# Patient Record
Sex: FEMALE | Race: WHITE | NOT HISPANIC OR LATINO | Employment: OTHER | URBAN - NONMETROPOLITAN AREA
[De-identification: names, ages, dates, MRNs, and addresses within clinical notes are randomized per-mention and may not be internally consistent; named-entity substitution may affect disease eponyms.]

---

## 2023-10-18 PROBLEM — M79.7 FIBROMYALGIA: Status: ACTIVE | Noted: 2019-11-13

## 2023-10-18 PROBLEM — R11.2 NAUSEA AND VOMITING: Status: ACTIVE | Noted: 2021-08-17

## 2023-10-18 PROBLEM — R26.89 IMBALANCE: Status: ACTIVE | Noted: 2019-12-27

## 2023-10-18 PROBLEM — N63.20 LEFT BREAST MASS: Status: ACTIVE | Noted: 2018-05-25

## 2023-10-18 PROBLEM — E66.9 OBESITY (BMI 35.0-39.9 WITHOUT COMORBIDITY): Status: ACTIVE | Noted: 2017-11-17

## 2023-10-18 PROBLEM — G89.29 CHRONIC PAIN: Status: ACTIVE | Noted: 2020-02-02

## 2023-10-18 PROBLEM — R92.8 ABNORMAL MAMMOGRAM OF LEFT BREAST: Status: ACTIVE | Noted: 2018-05-25

## 2023-10-18 PROBLEM — H93.13 TINNITUS AURIUM, BILATERAL: Status: RESOLVED | Noted: 2019-12-27 | Resolved: 2023-10-18

## 2023-10-18 PROBLEM — K76.0 NAFLD (NONALCOHOLIC FATTY LIVER DISEASE): Status: ACTIVE | Noted: 2018-05-21

## 2023-10-18 PROBLEM — J02.9 SORE THROAT: Status: ACTIVE | Noted: 2023-10-18

## 2023-10-18 RX ORDER — METHOCARBAMOL 500 MG/1
1000 TABLET, FILM COATED ORAL
COMMUNITY
Start: 2022-08-03 | End: 2023-10-19 | Stop reason: ALTCHOICE

## 2023-10-18 RX ORDER — GABAPENTIN 400 MG/1
400 CAPSULE ORAL 3 TIMES DAILY
COMMUNITY
Start: 2023-06-15 | End: 2023-10-19 | Stop reason: SDUPTHER

## 2023-10-18 RX ORDER — LAMOTRIGINE 200 MG/1
200 TABLET ORAL
COMMUNITY
Start: 2022-08-05 | End: 2023-10-19 | Stop reason: ALTCHOICE

## 2023-10-18 RX ORDER — CHOLECALCIFEROL (VITAMIN D3) 50 MCG
50 TABLET ORAL DAILY
COMMUNITY
Start: 2021-02-01

## 2023-10-18 RX ORDER — OMEPRAZOLE 40 MG/1
40 CAPSULE, DELAYED RELEASE ORAL
COMMUNITY
Start: 2022-03-04 | End: 2023-10-19 | Stop reason: ALTCHOICE

## 2023-10-19 ENCOUNTER — TELEPHONE (OUTPATIENT)
Dept: PRIMARY CARE | Facility: CLINIC | Age: 48
End: 2023-10-19

## 2023-10-19 ENCOUNTER — OFFICE VISIT (OUTPATIENT)
Dept: PRIMARY CARE | Facility: CLINIC | Age: 48
End: 2023-10-19
Payer: COMMERCIAL

## 2023-10-19 VITALS
DIASTOLIC BLOOD PRESSURE: 72 MMHG | BODY MASS INDEX: 29.77 KG/M2 | HEIGHT: 63 IN | SYSTOLIC BLOOD PRESSURE: 110 MMHG | HEART RATE: 74 BPM | WEIGHT: 168 LBS

## 2023-10-19 DIAGNOSIS — K22.70 BARRETT'S ESOPHAGUS WITHOUT DYSPLASIA: ICD-10-CM

## 2023-10-19 DIAGNOSIS — R19.8 ALTERNATING CONSTIPATION AND DIARRHEA: ICD-10-CM

## 2023-10-19 DIAGNOSIS — E55.9 VITAMIN D DEFICIENCY: ICD-10-CM

## 2023-10-19 DIAGNOSIS — E78.5 HYPERLIPIDEMIA, UNSPECIFIED HYPERLIPIDEMIA TYPE: ICD-10-CM

## 2023-10-19 DIAGNOSIS — M79.7 FIBROMYALGIA: Primary | ICD-10-CM

## 2023-10-19 DIAGNOSIS — Z12.31 BREAST CANCER SCREENING BY MAMMOGRAM: ICD-10-CM

## 2023-10-19 DIAGNOSIS — E61.1 IRON DEFICIENCY: ICD-10-CM

## 2023-10-19 DIAGNOSIS — F31.9 BIPOLAR AFFECTIVE DISORDER, REMISSION STATUS UNSPECIFIED (MULTI): ICD-10-CM

## 2023-10-19 DIAGNOSIS — Z79.899 MEDICATION MANAGEMENT: ICD-10-CM

## 2023-10-19 DIAGNOSIS — R73.01 IFG (IMPAIRED FASTING GLUCOSE): ICD-10-CM

## 2023-10-19 DIAGNOSIS — E53.8 B12 DEFICIENCY: ICD-10-CM

## 2023-10-19 PROBLEM — J02.9 SORE THROAT: Status: RESOLVED | Noted: 2023-10-18 | Resolved: 2023-10-19

## 2023-10-19 PROBLEM — R92.8 ABNORMAL MAMMOGRAM OF LEFT BREAST: Status: RESOLVED | Noted: 2018-05-25 | Resolved: 2023-10-19

## 2023-10-19 PROBLEM — R11.2 NAUSEA AND VOMITING: Status: RESOLVED | Noted: 2021-08-17 | Resolved: 2023-10-19

## 2023-10-19 PROBLEM — E66.9 OBESITY (BMI 35.0-39.9 WITHOUT COMORBIDITY): Status: RESOLVED | Noted: 2017-11-17 | Resolved: 2023-10-19

## 2023-10-19 LAB
AMPHETAMINES UR QL SCN: ABNORMAL
BARBITURATES UR QL SCN: ABNORMAL
BZE UR QL SCN: ABNORMAL
CANNABINOIDS UR QL SCN: ABNORMAL
CREAT UR-MCNC: 368.1 MG/DL (ref 20–320)
PCP UR QL SCN: ABNORMAL

## 2023-10-19 PROCEDURE — 99204 OFFICE O/P NEW MOD 45 MIN: CPT | Performed by: NURSE PRACTITIONER

## 2023-10-19 PROCEDURE — 82570 ASSAY OF URINE CREATININE: CPT

## 2023-10-19 PROCEDURE — 80307 DRUG TEST PRSMV CHEM ANLYZR: CPT

## 2023-10-19 PROCEDURE — 1036F TOBACCO NON-USER: CPT | Performed by: NURSE PRACTITIONER

## 2023-10-19 RX ORDER — OMEPRAZOLE 40 MG/1
40 CAPSULE, DELAYED RELEASE ORAL
Qty: 90 CAPSULE | Refills: 3 | Status: SHIPPED | OUTPATIENT
Start: 2023-10-19

## 2023-10-19 RX ORDER — BISMUTH SUBSALICYLATE 262 MG
1 TABLET,CHEWABLE ORAL DAILY
COMMUNITY

## 2023-10-19 RX ORDER — DICYCLOMINE HYDROCHLORIDE 10 MG/1
10 CAPSULE ORAL 4 TIMES DAILY PRN
Qty: 120 CAPSULE | Refills: 1 | Status: SHIPPED | OUTPATIENT
Start: 2023-10-19

## 2023-10-19 RX ORDER — GABAPENTIN 400 MG/1
400 CAPSULE ORAL 3 TIMES DAILY
Qty: 90 CAPSULE | Refills: 2 | Status: SHIPPED | OUTPATIENT
Start: 2023-10-19 | End: 2023-11-14 | Stop reason: ALTCHOICE

## 2023-10-19 ASSESSMENT — PATIENT HEALTH QUESTIONNAIRE - PHQ9
2. FEELING DOWN, DEPRESSED OR HOPELESS: NOT AT ALL
SUM OF ALL RESPONSES TO PHQ9 QUESTIONS 1 AND 2: 0
1. LITTLE INTEREST OR PLEASURE IN DOING THINGS: NOT AT ALL

## 2023-10-19 ASSESSMENT — ENCOUNTER SYMPTOMS
VOMITING: 0
DYSURIA: 0
FREQUENCY: 0
HEADACHES: 0
WHEEZING: 0
SEIZURES: 0
DIARRHEA: 0
TROUBLE SWALLOWING: 0
ABDOMINAL PAIN: 0
MYALGIAS: 0
SLEEP DISTURBANCE: 0
CHILLS: 0
HEMATURIA: 0
JOINT SWELLING: 0
SHORTNESS OF BREATH: 0
COUGH: 0
CONSTIPATION: 0
APNEA: 0
NAUSEA: 0
WOUND: 0
CONFUSION: 0
FEVER: 0
ARTHRALGIAS: 0
SPEECH DIFFICULTY: 0
NUMBNESS: 0
PALPITATIONS: 0
FATIGUE: 0
EYE PAIN: 0
PHOTOPHOBIA: 0
NERVOUS/ANXIOUS: 0
CHOKING: 0
WEAKNESS: 0
UNEXPECTED WEIGHT CHANGE: 0
DIFFICULTY URINATING: 0
FACIAL ASYMMETRY: 0
NECK PAIN: 0
DIZZINESS: 0
BLOOD IN STOOL: 0
FLANK PAIN: 0
ABDOMINAL DISTENTION: 0
EYE REDNESS: 0
CHEST TIGHTNESS: 0
BACK PAIN: 0
SORE THROAT: 0

## 2023-10-19 NOTE — TELEPHONE ENCOUNTER
Patient received instructions and went over with them, patient would like a call to schedule colon and egd.

## 2023-10-19 NOTE — TELEPHONE ENCOUNTER
SCHEDULED  11/16/23  MAXIMUS TO SCHEDULE IN [facility] AND I ASKED FOR AN AFTERNOON TIME FRAME.

## 2023-10-19 NOTE — PROGRESS NOTES
Subjective   Patient ID: Fidelia Santiago is a 48 y.o. female who presents for Establish Care (PT WOULD LIKE TO RE START HER MEDICATION FOR FIBROMYALGIA ).      HPI:  Presents today [facility]. SHE RECENTLY MOVED TO THE [facility].   MED REFILL OF GABAPENTIN, WHICH SHE TAKES FOR FIBRO. SHE HAS BEEN OFF MED X 2 MONTHS R/T FINDING A NEW PCP BUT STATES THE PREVIOUS DOSE WAS HELPING.      ALTERNATING DIARRHEA AND CONSTIPATION X SEVERAL YEARS. LAST COLON 11/21. WAS TO BE REPEATED IN 3 YEARS R/T PREP. WILL ORDER R/T DIARRHEA AND CONSTIPATION   [facility]GG'S-LAST EGD 11/2021. HAS NOT BEEN TAKING PPI. INSTRUCTED TO START AND WILL ORDER EGD   States h/o IRON DEF- WILL CHECK LABS  B12- SHE WAS TAKING B12 INJECTIONS. WILL CHECK LEVELS   BIPOLAR- STABLE WITH THERAPY THROUGH [facility].  REFUSING MED MANAGEMENT   LEFT BREAST MASS ATYPICAL CELL REMOVED 6-7 YEARS AGO      OARRS:  Martha Ramirez, APRN-CNP on 10/19/2023 10:21 AM  I have personally reviewed the OARRS report for Fidelia Santiago. I have considered the risks of abuse, dependence, addiction and diversion    Is the patient prescribed a combination of a benzodiazepine and opioid?  No    Last Urine Drug Screen / ordered today: Yes  Recent Results (from the past 8760 hour(s))   DRUG SCREEN,URINE    Collection Time: 05/12/23  6:49 PM   Result Value Ref Range    DRUG SCREEN COMMENT URINE SEE BELOW     Amphetamine Screen, Urine PRESUMPTIVE NEGATIVE NEGATIVE    Barbiturate Screen, Urine PRESUMPTIVE NEGATIVE NEGATIVE    BENZODIAZEPINE (PRESENCE) IN URINE BY SCREEN METHOD PRESUMPTIVE NEGATIVE NEGATIVE    Cannabinoid Screen, Urine PRESUMPTIVE NEGATIVE NEGATIVE    Cocaine Screen, Urine PRESUMPTIVE NEGATIVE NEGATIVE    Fentanyl, Ur PRESUMPTIVE NEGATIVE NEGATIVE    Methadone Screen, Urine PRESUMPTIVE NEGATIVE NEGATIVE    Opiate Screen, Urine PRESUMPTIVE NEGATIVE NEGATIVE    Oxycodone Screen, Ur PRESUMPTIVE NEGATIVE NEGATIVE    PCP Screen, Urine PRESUMPTIVE NEGATIVE  "NEGATIVE     N/A        Controlled Substance Agreement:  Date of the Last Agreement: 10/19/2023  Reviewed Controlled Substance Agreement including but not limited to the benefits, risks, and alternatives to treatment with a Controlled Substance medication(s).      Visit Vitals  /72 (BP Location: Right arm, Patient Position: Sitting)   Pulse 74   Ht 1.6 m (5' 3\")   Wt 76.2 kg (168 lb)   BMI 29.76 kg/m²   OB Status Having periods   Smoking Status Never   BSA 1.84 m²        Review of Systems   Constitutional:  Negative for chills, fatigue, fever and unexpected weight change.   HENT:  Negative for congestion, ear pain, sore throat and trouble swallowing.    Eyes:  Negative for photophobia, pain, redness and visual disturbance.   Respiratory:  Negative for apnea, cough, choking, chest tightness, shortness of breath and wheezing.    Cardiovascular:  Negative for chest pain, palpitations and leg swelling.   Gastrointestinal:  Negative for abdominal distention, abdominal pain, blood in stool, constipation, diarrhea, nausea and vomiting.   Genitourinary:  Negative for difficulty urinating, dysuria, flank pain, frequency, hematuria and urgency.   Musculoskeletal:  Negative for arthralgias, back pain, gait problem, joint swelling, myalgias and neck pain.   Skin:  Negative for rash and wound.   Neurological:  Negative for dizziness, seizures, syncope, facial asymmetry, speech difficulty, weakness, numbness and headaches.   Psychiatric/Behavioral:  Negative for confusion, sleep disturbance and suicidal ideas. The patient is not nervous/anxious.        Objective     Physical Exam  Constitutional:       Appearance: Normal appearance. She is normal weight.   HENT:      Head: Normocephalic.   Eyes:      Extraocular Movements: Extraocular movements intact.      Conjunctiva/sclera: Conjunctivae normal.      Pupils: Pupils are equal, round, and reactive to light.   Cardiovascular:      Rate and Rhythm: Normal rate and regular " rhythm.      Pulses: Normal pulses.      Heart sounds: Normal heart sounds.   Pulmonary:      Effort: Pulmonary effort is normal.      Breath sounds: Normal breath sounds.   Musculoskeletal:         General: Normal range of motion.      Cervical back: Normal range of motion.   Skin:     General: Skin is warm and dry.   Neurological:      General: No focal deficit present.      Mental Status: She is alert and oriented to person, place, and time.   Psychiatric:         Mood and Affect: Mood normal.         Behavior: Behavior normal.         Thought Content: Thought content normal.         Judgment: Judgment normal.            Assessment/Plan   Problem List Items Addressed This Visit       Bipolar disorder, unspecified (CMS/[facility])    Relevant Orders    Comprehensive Metabolic Panel    Hemoglobin A1C    Lipid Panel    TSH with reflex to Free T4 if abnormal    CBC and Auto Differential    Iron and TIBC    Folate    Ferritin    Vitamin B12    Vitamin D 25-Hydroxy,Total (for eval of Vitamin D levels)    Parathyroid Hormone, Intact    Fibromyalgia - Primary    Relevant Medications    gabapentin (Neurontin) 400 mg capsule    Other Relevant Orders    Comprehensive Metabolic Panel    Hemoglobin A1C    Lipid Panel    TSH with reflex to Free T4 if abnormal    CBC and Auto Differential    Iron and TIBC    Folate    Ferritin    Vitamin B12    Vitamin D 25-Hydroxy,Total (for eval of Vitamin D levels)    Parathyroid Hormone, Intact    Breast cancer screening by mammogram    Relevant Orders    BI mammo bilateral screening tomosynthesis    B12 deficiency    Relevant Orders    Comprehensive Metabolic Panel    Hemoglobin A1C    Lipid Panel    TSH with reflex to Free T4 if abnormal    CBC and Auto Differential    Iron and TIBC    Folate    Ferritin    Vitamin B12    Vitamin D 25-Hydroxy,Total (for eval of Vitamin D levels)    Parathyroid Hormone, Intact    Iron deficiency    Relevant Orders    Comprehensive Metabolic Panel    Hemoglobin  A1C    Lipid Panel    TSH with reflex to Free T4 if abnormal    CBC and Auto Differential    Iron and TIBC    Folate    Ferritin    Vitamin B12    Vitamin D 25-Hydroxy,Total (for eval of Vitamin D levels)    Parathyroid Hormone, Intact    Vitamin D deficiency    Relevant Orders    Comprehensive Metabolic Panel    Hemoglobin A1C    Lipid Panel    TSH with reflex to Free T4 if abnormal    CBC and Auto Differential    Iron and TIBC    Folate    Ferritin    Vitamin B12    Vitamin D 25-Hydroxy,Total (for eval of Vitamin D levels)    Parathyroid Hormone, Intact     Other Visit Diagnoses       IFG (impaired fasting glucose)        Relevant Orders    Hemoglobin A1C    Hyperlipidemia, unspecified hyperlipidemia type        Relevant Orders    Comprehensive Metabolic Panel    Hemoglobin A1C    Lipid Panel    TSH with reflex to Free T4 if abnormal    CBC and Auto Differential    Iron and TIBC    Folate    Ferritin    Vitamin B12    Vitamin D 25-Hydroxy,Total (for eval of Vitamin D levels)    Parathyroid Hormone, Intact    Medication management        Relevant Orders    Opiate/Opioid/Benzo Prescription Compliance    Torres's esophagus without dysplasia        Relevant Medications    omeprazole (PriLOSEC) 40 mg DR capsule    Other Relevant Orders    EGD    Alternating constipation and diarrhea        Relevant Medications    dicyclomine (Bentyl) 10 mg capsule    Other Relevant Orders    Colonoscopy Diagnostic            WE DISCUSSED MOST COMMON SIDE EFFECTS OF PRESCRIBED MEDICATIONS. INDICATIONS, RISK, COMPLICATIONS, AND ALTERNATIVES OF MEDICATION/THERAPEUTICS WERE EXPLAINED AND DISCUSSED. PLEASE MONITOR CLOSELY FOR ANY UNTOWARD SIDE EFFECTS OR COMPLICATIONS OF MEDICATIONS. PATIENT IS STRONGLY ADVISED TO BE COMPLIANT WITH RECOMMENDATIONS. QUESTIONS AND CONCERNS WERE ADDRESSED. INSTRUCTED TO CALL, RETURN SOONER, OR GO TO THE ER,  IF SYMPTOMS PERSIST OR WORSEN. THEY VOICED UNDERSTANDING AND  DENIES FURTHER QUESTIONS AT THIS  TIME.    TIME CODE  1. PREPARATION FOR PATIENT'S VISIT (REVIEWING CHART, CURRENT MEDICAL RECORDS, OUTSIDE HEALTH PROVIDER RECORDS, PREVIOUS HISTORY, EXAM, TEST, PROCEDURE, AND MEDICATIONS)  2. FACE TO FACE ENCOUNTER OBTAINING HISTORY FROM THE PATIENT/FAMILY/CAREGIVERS; PERFORMING EVALUATION AND EXAMINATION; ORDERING TESTS OR PROCEDURES; REFERRING AND COMMUNICATING WITH OTHER HEALTHCARE PROVIDERS; COUNSELING AND EDUCATION OF THE PATIENT/FAMILY/CAREGIVERS; INDEPENDENTLY INTERPRETING RESULTS (TESTS, LABS, PROCEDURES, IMAGING) AND COMMUNICATING AND EXPLAINING RESULTS TO THE PATIENT/FAMILY/CAREGIVERS  3. COORDINATION OF CARE; PREPARING AND PRINTING DISCHARGE INSTRUCTIONS AND ANY EDUCATIONAL MATERIAL FOR THE PATIENT/FAMILY/CAREGIVERS. DOCUMENTING CLINICAL INFORMATION IN THE ELECTRONIC MEDICAL RECORD   4. REVIEWING OARRS AS NEEDED    MDM  1) COMPLEXITY: MORE THAN 1 STABLE CHRONIC CONDITION ADDRESSED OR 1 ACUTE ILLNESS ADDRESSED   2)DATA: TESTS INTERPRETED AND OR ORDERED, TOOK INDEPENDENT HISTORY OR RECORDS REVIEWED  3)RISK: MODERATE RISK DUE TO NATURE OF MEDICAL CONDITIONS/COMORBIDITY OR MEDICATIONS ORDERED OR SURGICAL OR PROCEDURE REFERRAL      AFTER TESTING

## 2023-10-25 ENCOUNTER — HOSPITAL ENCOUNTER (OUTPATIENT)
Dept: RADIOLOGY | Facility: HOSPITAL | Age: 48
Discharge: HOME | End: 2023-10-25
Payer: COMMERCIAL

## 2023-10-25 DIAGNOSIS — Z12.31 BREAST CANCER SCREENING BY MAMMOGRAM: ICD-10-CM

## 2023-10-25 PROCEDURE — 77067 SCR MAMMO BI INCL CAD: CPT

## 2023-10-25 PROCEDURE — 77067 SCR MAMMO BI INCL CAD: CPT | Performed by: RADIOLOGY

## 2023-10-25 PROCEDURE — 77063 BREAST TOMOSYNTHESIS BI: CPT | Performed by: RADIOLOGY

## 2023-10-27 ENCOUNTER — HOSPITAL ENCOUNTER (OUTPATIENT)
Dept: RADIOLOGY | Facility: EXTERNAL LOCATION | Age: 48
Discharge: HOME | End: 2023-10-27
Payer: COMMERCIAL

## 2023-11-14 ENCOUNTER — TELEMEDICINE (OUTPATIENT)
Dept: PRIMARY CARE | Facility: CLINIC | Age: 48
End: 2023-11-14
Payer: COMMERCIAL

## 2023-11-14 DIAGNOSIS — J01.90 ACUTE NON-RECURRENT SINUSITIS, UNSPECIFIED LOCATION: Primary | ICD-10-CM

## 2023-11-14 PROCEDURE — 99214 OFFICE O/P EST MOD 30 MIN: CPT | Performed by: NURSE PRACTITIONER

## 2023-11-14 RX ORDER — PREDNISONE 10 MG/1
30 TABLET ORAL DAILY
Qty: 15 TABLET | Refills: 0 | Status: SHIPPED | OUTPATIENT
Start: 2023-11-14 | End: 2023-12-14 | Stop reason: ALTCHOICE

## 2023-11-14 RX ORDER — AZITHROMYCIN 250 MG/1
TABLET, FILM COATED ORAL
Qty: 6 TABLET | Refills: 0 | Status: SHIPPED | OUTPATIENT
Start: 2023-11-14 | End: 2023-11-19

## 2023-11-14 ASSESSMENT — ENCOUNTER SYMPTOMS
NERVOUS/ANXIOUS: 0
MYALGIAS: 0
DIARRHEA: 0
FREQUENCY: 0
NUMBNESS: 0
BACK PAIN: 0
CHOKING: 0
FACIAL ASYMMETRY: 0
CONFUSION: 0
EYE PAIN: 0
ABDOMINAL PAIN: 0
UNEXPECTED WEIGHT CHANGE: 0
JOINT SWELLING: 0
TROUBLE SWALLOWING: 0
CHILLS: 0
COUGH: 1
SPEECH DIFFICULTY: 0
DIFFICULTY URINATING: 0
PHOTOPHOBIA: 0
NAUSEA: 0
FATIGUE: 1
SORE THROAT: 0
ABDOMINAL DISTENTION: 0
SLEEP DISTURBANCE: 0
DIZZINESS: 0
VOMITING: 0
WEAKNESS: 0
WHEEZING: 0
PALPITATIONS: 0
CHEST TIGHTNESS: 0
SHORTNESS OF BREATH: 0
APNEA: 0
ARTHRALGIAS: 0
DYSURIA: 0
BLOOD IN STOOL: 0
HEADACHES: 0
NECK PAIN: 0
WOUND: 0
SEIZURES: 0
HEMATURIA: 0
EYE REDNESS: 0
CONSTIPATION: 0
FLANK PAIN: 0
FEVER: 0

## 2023-11-14 ASSESSMENT — PATIENT HEALTH QUESTIONNAIRE - PHQ9
2. FEELING DOWN, DEPRESSED OR HOPELESS: NOT AT ALL
1. LITTLE INTEREST OR PLEASURE IN DOING THINGS: NOT AT ALL
SUM OF ALL RESPONSES TO PHQ9 QUESTIONS 1 AND 2: 0

## 2023-11-14 NOTE — PROGRESS NOTES
Subjective   Patient ID: Fidelia Santiago is a 48 y.o. female who presents for Cough (Productive Associated with green phlegm and congestion x 7 days ).      VIRTUAL APPOINTMENT BEING PERFORMED DUE TO COVID-19 (CORONAVIRUS)    HPI:  Presents today for C/O NASAL CONGESTION AND PRODUCTIVE COUGH X 7 DAYS  modifying factors consists of NONE  associated symptoms consist of CHEST PRESSURE ON/OFF.  EAR PAIN. NO SOB, CP, OR WHEEZING prior treatment consists of medication OTC COUGH MEDS     Visit Vitals  OB Status Having periods   Smoking Status Never        Review of Systems   Constitutional:  Positive for fatigue. Negative for chills, fever and unexpected weight change.   HENT:  Positive for congestion and ear pain. Negative for sore throat and trouble swallowing.    Eyes:  Negative for photophobia, pain, redness and visual disturbance.   Respiratory:  Positive for cough. Negative for apnea, choking, chest tightness, shortness of breath and wheezing.    Cardiovascular:  Negative for chest pain, palpitations and leg swelling.   Gastrointestinal:  Negative for abdominal distention, abdominal pain, blood in stool, constipation, diarrhea, nausea and vomiting.   Genitourinary:  Negative for difficulty urinating, dysuria, flank pain, frequency, hematuria and urgency.   Musculoskeletal:  Negative for arthralgias, back pain, gait problem, joint swelling, myalgias and neck pain.   Skin:  Negative for rash and wound.   Neurological:  Negative for dizziness, seizures, syncope, facial asymmetry, speech difficulty, weakness, numbness and headaches.   Psychiatric/Behavioral:  Negative for confusion, sleep disturbance and suicidal ideas. The patient is not nervous/anxious.        Objective     Physical Exam  Neurological:      Mental Status: She is alert.   Psychiatric:         Mood and Affect: Mood normal.         Behavior: Behavior normal.         Thought Content: Thought content normal.         Judgment: Judgment normal.             Assessment/Plan   Problem List Items Addressed This Visit       Acute non-recurrent sinusitis - Primary    Relevant Medications    azithromycin (Zithromax) 250 mg tablet    predniSONE (Deltasone) 10 mg tablet     INSTRUCTED TO INCREASE FLUID INTAKE AND TAKE TYLENOL 650 MG PO Q6H/PRN FOR PAIN OR FEVER. RETURN SOONER OR GO TO THE ER IF SYMPTOMS PERSIST OR WORSEN      WE DISCUSSED MOST COMMON SIDE EFFECTS OF PRESCRIBED MEDICATIONS. INDICATIONS, RISK, COMPLICATIONS, AND ALTERNATIVES OF MEDICATION/THERAPEUTICS WERE EXPLAINED AND DISCUSSED. PLEASE MONITOR CLOSELY FOR ANY UNTOWARD SIDE EFFECTS OR COMPLICATIONS OF MEDICATIONS. PATIENT IS STRONGLY ADVISED TO BE COMPLIANT WITH RECOMMENDATIONS. QUESTIONS AND CONCERNS WERE ADDRESSED. INSTRUCTED TO CALL, RETURN SOONER, OR GO TO THE ER,  IF SYMPTOMS PERSIST OR WORSEN. THEY VOICED UNDERSTANDING AND  DENIES FURTHER QUESTIONS AT THIS TIME.    TIME CODE  1. PREPARATION FOR PATIENT'S VISIT (REVIEWING CHART, CURRENT MEDICAL RECORDS, OUTSIDE HEALTH PROVIDER RECORDS, PREVIOUS HISTORY, EXAM, TEST, PROCEDURE, AND MEDICATIONS)  2. FACE TO FACE ENCOUNTER OBTAINING HISTORY FROM THE PATIENT/FAMILY/CAREGIVERS; PERFORMING EVALUATION AND EXAMINATION; ORDERING TESTS OR PROCEDURES; REFERRING AND COMMUNICATING WITH OTHER HEALTHCARE PROVIDERS; COUNSELING AND EDUCATION OF THE PATIENT/FAMILY/CAREGIVERS; INDEPENDENTLY INTERPRETING RESULTS (TESTS, LABS, PROCEDURES, IMAGING) AND COMMUNICATING AND EXPLAINING RESULTS TO THE PATIENT/FAMILY/CAREGIVERS  3. COORDINATION OF CARE; PREPARING AND PRINTING DISCHARGE INSTRUCTIONS AND ANY EDUCATIONAL MATERIAL FOR THE PATIENT/FAMILY/CAREGIVERS. DOCUMENTING CLINICAL INFORMATION IN THE ELECTRONIC MEDICAL RECORD   4. REVIEWING OARRS AS NEEDED    MDM  1) COMPLEXITY: MORE THAN 1 STABLE CHRONIC CONDITION ADDRESSED OR 1 ACUTE ILLNESS ADDRESSED   2)DATA: TESTS INTERPRETED AND OR ORDERED, TOOK INDEPENDENT HISTORY OR RECORDS REVIEWED  3)RISK: MODERATE RISK DUE TO NATURE OF  MEDICAL CONDITIONS/COMORBIDITY OR MEDICATIONS ORDERED OR SURGICAL OR PROCEDURE REFERRAL      1 MONTH WITH LABS ALREADY ORDER

## 2023-11-16 ENCOUNTER — APPOINTMENT (OUTPATIENT)
Dept: GASTROENTEROLOGY | Facility: CLINIC | Age: 48
End: 2023-11-16
Payer: COMMERCIAL

## 2023-11-27 ENCOUNTER — APPOINTMENT (OUTPATIENT)
Dept: RADIOLOGY | Facility: HOSPITAL | Age: 48
End: 2023-11-27
Payer: COMMERCIAL

## 2023-11-27 ENCOUNTER — HOSPITAL ENCOUNTER (EMERGENCY)
Facility: HOSPITAL | Age: 48
Discharge: HOME | End: 2023-11-27
Attending: EMERGENCY MEDICINE
Payer: COMMERCIAL

## 2023-11-27 VITALS
HEIGHT: 63 IN | DIASTOLIC BLOOD PRESSURE: 81 MMHG | RESPIRATION RATE: 18 BRPM | SYSTOLIC BLOOD PRESSURE: 126 MMHG | BODY MASS INDEX: 28.35 KG/M2 | WEIGHT: 160 LBS | OXYGEN SATURATION: 98 % | HEART RATE: 75 BPM | TEMPERATURE: 97.8 F

## 2023-11-27 DIAGNOSIS — N20.0 KIDNEY STONE: Primary | ICD-10-CM

## 2023-11-27 LAB
ALBUMIN SERPL BCP-MCNC: 4.3 G/DL (ref 3.4–5)
ALP SERPL-CCNC: 110 U/L (ref 33–110)
ALT SERPL W P-5'-P-CCNC: 12 U/L (ref 7–45)
ANION GAP SERPL CALC-SCNC: 9 MMOL/L (ref 10–20)
APPEARANCE UR: CLEAR
AST SERPL W P-5'-P-CCNC: 13 U/L (ref 9–39)
B-HCG SERPL-ACNC: 5 MIU/ML
BASOPHILS # BLD AUTO: 0.03 X10*3/UL (ref 0–0.1)
BASOPHILS NFR BLD AUTO: 0.4 %
BILIRUB SERPL-MCNC: 0.4 MG/DL (ref 0–1.2)
BILIRUB UR STRIP.AUTO-MCNC: NEGATIVE MG/DL
BUN SERPL-MCNC: 12 MG/DL (ref 6–23)
CALCIUM SERPL-MCNC: 9.1 MG/DL (ref 8.6–10.3)
CHLORIDE SERPL-SCNC: 107 MMOL/L (ref 98–107)
CO2 SERPL-SCNC: 30 MMOL/L (ref 21–32)
COLOR UR: YELLOW
CREAT SERPL-MCNC: 0.76 MG/DL (ref 0.5–1.05)
EOSINOPHIL # BLD AUTO: 0.02 X10*3/UL (ref 0–0.7)
EOSINOPHIL NFR BLD AUTO: 0.3 %
ERYTHROCYTE [DISTWIDTH] IN BLOOD BY AUTOMATED COUNT: 12.9 % (ref 11.5–14.5)
GFR SERPL CREATININE-BSD FRML MDRD: >90 ML/MIN/1.73M*2
GLUCOSE SERPL-MCNC: 85 MG/DL (ref 74–99)
GLUCOSE UR STRIP.AUTO-MCNC: NEGATIVE MG/DL
HCT VFR BLD AUTO: 42.8 % (ref 36–46)
HGB BLD-MCNC: 13.8 G/DL (ref 12–16)
HOLD SPECIMEN: NORMAL
IMM GRANULOCYTES # BLD AUTO: 0.03 X10*3/UL (ref 0–0.7)
IMM GRANULOCYTES NFR BLD AUTO: 0.4 % (ref 0–0.9)
KETONES UR STRIP.AUTO-MCNC: NEGATIVE MG/DL
LACTATE SERPL-SCNC: 0.8 MMOL/L (ref 0.4–2)
LEUKOCYTE ESTERASE UR QL STRIP.AUTO: NEGATIVE
LIPASE SERPL-CCNC: 61 U/L (ref 9–82)
LYMPHOCYTES # BLD AUTO: 1.89 X10*3/UL (ref 1.2–4.8)
LYMPHOCYTES NFR BLD AUTO: 24.4 %
MCH RBC QN AUTO: 29.4 PG (ref 26–34)
MCHC RBC AUTO-ENTMCNC: 32.2 G/DL (ref 32–36)
MCV RBC AUTO: 91 FL (ref 80–100)
MONOCYTES # BLD AUTO: 0.56 X10*3/UL (ref 0.1–1)
MONOCYTES NFR BLD AUTO: 7.2 %
NEUTROPHILS # BLD AUTO: 5.23 X10*3/UL (ref 1.2–7.7)
NEUTROPHILS NFR BLD AUTO: 67.3 %
NITRITE UR QL STRIP.AUTO: NEGATIVE
NRBC BLD-RTO: 0 /100 WBCS (ref 0–0)
PH UR STRIP.AUTO: 6 [PH]
PLATELET # BLD AUTO: 231 X10*3/UL (ref 150–450)
POTASSIUM SERPL-SCNC: 3.8 MMOL/L (ref 3.5–5.3)
PROT SERPL-MCNC: 6.8 G/DL (ref 6.4–8.2)
PROT UR STRIP.AUTO-MCNC: NEGATIVE MG/DL
RBC # BLD AUTO: 4.69 X10*6/UL (ref 4–5.2)
RBC # UR STRIP.AUTO: NEGATIVE /UL
SODIUM SERPL-SCNC: 142 MMOL/L (ref 136–145)
SP GR UR STRIP.AUTO: 1.02
UROBILINOGEN UR STRIP.AUTO-MCNC: <2 MG/DL
WBC # BLD AUTO: 7.8 X10*3/UL (ref 4.4–11.3)

## 2023-11-27 PROCEDURE — 99284 EMERGENCY DEPT VISIT MOD MDM: CPT | Mod: 25 | Performed by: EMERGENCY MEDICINE

## 2023-11-27 PROCEDURE — 74177 CT ABD & PELVIS W/CONTRAST: CPT | Mod: FOREIGN READ | Performed by: RADIOLOGY

## 2023-11-27 PROCEDURE — 36415 COLL VENOUS BLD VENIPUNCTURE: CPT | Performed by: EMERGENCY MEDICINE

## 2023-11-27 PROCEDURE — 96375 TX/PRO/DX INJ NEW DRUG ADDON: CPT

## 2023-11-27 PROCEDURE — 81003 URINALYSIS AUTO W/O SCOPE: CPT | Performed by: EMERGENCY MEDICINE

## 2023-11-27 PROCEDURE — 85025 COMPLETE CBC W/AUTO DIFF WBC: CPT | Performed by: EMERGENCY MEDICINE

## 2023-11-27 PROCEDURE — 84702 CHORIONIC GONADOTROPIN TEST: CPT | Performed by: EMERGENCY MEDICINE

## 2023-11-27 PROCEDURE — 94760 N-INVAS EAR/PLS OXIMETRY 1: CPT

## 2023-11-27 PROCEDURE — 96361 HYDRATE IV INFUSION ADD-ON: CPT

## 2023-11-27 PROCEDURE — 2550000001 HC RX 255 CONTRASTS: Performed by: EMERGENCY MEDICINE

## 2023-11-27 PROCEDURE — 83605 ASSAY OF LACTIC ACID: CPT | Performed by: EMERGENCY MEDICINE

## 2023-11-27 PROCEDURE — 2500000004 HC RX 250 GENERAL PHARMACY W/ HCPCS (ALT 636 FOR OP/ED): Performed by: EMERGENCY MEDICINE

## 2023-11-27 PROCEDURE — 83690 ASSAY OF LIPASE: CPT | Performed by: EMERGENCY MEDICINE

## 2023-11-27 PROCEDURE — 74177 CT ABD & PELVIS W/CONTRAST: CPT

## 2023-11-27 PROCEDURE — 80053 COMPREHEN METABOLIC PANEL: CPT | Performed by: EMERGENCY MEDICINE

## 2023-11-27 PROCEDURE — 96374 THER/PROPH/DIAG INJ IV PUSH: CPT

## 2023-11-27 RX ORDER — MORPHINE SULFATE 4 MG/ML
4 INJECTION, SOLUTION INTRAMUSCULAR; INTRAVENOUS ONCE
Status: COMPLETED | OUTPATIENT
Start: 2023-11-27 | End: 2023-11-27

## 2023-11-27 RX ORDER — MORPHINE SULFATE 4 MG/ML
INJECTION, SOLUTION INTRAMUSCULAR; INTRAVENOUS
Status: DISPENSED
Start: 2023-11-27 | End: 2023-11-27

## 2023-11-27 RX ORDER — ONDANSETRON HYDROCHLORIDE 4 MG/2ML
4 INJECTION, SOLUTION INTRAMUSCULAR; INTRAVENOUS ONCE
Status: DISCONTINUED | OUTPATIENT
Start: 2023-11-27 | End: 2023-11-27

## 2023-11-27 RX ORDER — ONDANSETRON HYDROCHLORIDE 2 MG/ML
4 INJECTION, SOLUTION INTRAVENOUS ONCE
Status: COMPLETED | OUTPATIENT
Start: 2023-11-27 | End: 2023-11-27

## 2023-11-27 RX ORDER — ONDANSETRON HYDROCHLORIDE 2 MG/ML
INJECTION, SOLUTION INTRAVENOUS
Status: DISPENSED
Start: 2023-11-27 | End: 2023-11-27

## 2023-11-27 RX ORDER — SODIUM CHLORIDE 9 MG/ML
150 INJECTION, SOLUTION INTRAVENOUS CONTINUOUS
Status: DISCONTINUED | OUTPATIENT
Start: 2023-11-27 | End: 2023-11-27 | Stop reason: HOSPADM

## 2023-11-27 RX ADMIN — SODIUM CHLORIDE 150 ML/HR: 9 INJECTION, SOLUTION INTRAVENOUS at 13:40

## 2023-11-27 RX ADMIN — SODIUM CHLORIDE 500 ML: 9 INJECTION, SOLUTION INTRAVENOUS at 11:54

## 2023-11-27 RX ADMIN — ONDANSETRON 4 MG: 2 INJECTION INTRAMUSCULAR; INTRAVENOUS at 11:53

## 2023-11-27 RX ADMIN — MORPHINE SULFATE 4 MG: 4 INJECTION, SOLUTION INTRAMUSCULAR; INTRAVENOUS at 11:53

## 2023-11-27 RX ADMIN — IOHEXOL 70 ML: 350 INJECTION, SOLUTION INTRAVENOUS at 14:19

## 2023-11-27 ASSESSMENT — PAIN DESCRIPTION - LOCATION: LOCATION: ABDOMEN

## 2023-11-27 ASSESSMENT — PAIN SCALES - GENERAL
PAINLEVEL_OUTOF10: 0 - NO PAIN
PAINLEVEL_OUTOF10: 9

## 2023-11-27 ASSESSMENT — PAIN DESCRIPTION - FREQUENCY: FREQUENCY: CONSTANT/CONTINUOUS

## 2023-11-27 ASSESSMENT — PAIN DESCRIPTION - ORIENTATION: ORIENTATION: RIGHT

## 2023-11-27 ASSESSMENT — PAIN DESCRIPTION - PROGRESSION: CLINICAL_PROGRESSION: GRADUALLY WORSENING

## 2023-11-27 ASSESSMENT — COLUMBIA-SUICIDE SEVERITY RATING SCALE - C-SSRS
6. HAVE YOU EVER DONE ANYTHING, STARTED TO DO ANYTHING, OR PREPARED TO DO ANYTHING TO END YOUR LIFE?: NO
2. HAVE YOU ACTUALLY HAD ANY THOUGHTS OF KILLING YOURSELF?: NO
1. IN THE PAST MONTH, HAVE YOU WISHED YOU WERE DEAD OR WISHED YOU COULD GO TO SLEEP AND NOT WAKE UP?: NO

## 2023-11-27 ASSESSMENT — PAIN DESCRIPTION - DESCRIPTORS: DESCRIPTORS: ACHING;SHOOTING;STABBING

## 2023-11-27 ASSESSMENT — PAIN - FUNCTIONAL ASSESSMENT
PAIN_FUNCTIONAL_ASSESSMENT: 0-10
PAIN_FUNCTIONAL_ASSESSMENT: 0-10

## 2023-11-27 ASSESSMENT — PAIN DESCRIPTION - PAIN TYPE: TYPE: ACUTE PAIN

## 2023-11-27 ASSESSMENT — PAIN DESCRIPTION - ONSET: ONSET: GRADUAL

## 2023-11-27 NOTE — ED PROVIDER NOTES
Right flank, back pain, possible kidney stone.  This 48-year-old white female presents to the ED with complaint of right-sided flank and back pain with concern for possible kidney stone.  She states that her symptoms started Thursday evening with urinary frequency and then developed right flank pain on Friday with radiation into her belly and hip area she states that the pain is migrated down the right side of her lower back and now has pain also down the anterior thigh she denies any focal neurologic symptoms.  She states that her last kidney stone was 6 months ago.  She does admit to having similar symptoms previously with kidney stones.  She denies any hematuria.  She states that movement makes her pain worse rest helps to some extent.      History provided by:  Patient   used: No         Physical Exam  Vitals and nursing note reviewed.   Constitutional:       General: She is awake.      Appearance: Normal appearance. She is overweight.   HENT:      Head: Normocephalic and atraumatic.      Jaw: There is normal jaw occlusion.      Nose: Nose normal. No congestion or rhinorrhea.      Mouth/Throat:      Mouth: Mucous membranes are moist.      Pharynx: Oropharynx is clear. No oropharyngeal exudate or posterior oropharyngeal erythema.   Eyes:      General: Lids are normal.         Right eye: No discharge.         Left eye: No discharge.      Extraocular Movements: Extraocular movements intact.      Conjunctiva/sclera: Conjunctivae normal.      Pupils: Pupils are equal, round, and reactive to light.   Cardiovascular:      Rate and Rhythm: Normal rate and regular rhythm.      Pulses: Normal pulses.      Heart sounds: Normal heart sounds. No murmur heard.     No friction rub. No gallop.   Pulmonary:      Effort: Pulmonary effort is normal. No respiratory distress.      Breath sounds: Normal breath sounds. No stridor. No wheezing, rhonchi or rales.   Chest:      Chest wall: No tenderness.   Abdominal:       General: Abdomen is flat. Bowel sounds are normal. There is no distension.      Palpations: Abdomen is soft. There is no mass.      Tenderness: There is abdominal tenderness in the right lower quadrant. There is no guarding or rebound.      Hernia: No hernia is present.   Musculoskeletal:         General: No swelling, deformity or signs of injury. Normal range of motion.      Cervical back: Normal, full passive range of motion without pain, normal range of motion and neck supple.      Thoracic back: Normal.      Lumbar back: Tenderness present. No bony tenderness. Normal range of motion. Negative right straight leg raise test and negative left straight leg raise test.        Back:       Right lower leg: No edema.      Left lower leg: No edema.      Comments: Patient has increased discomfort with straight leg testing on the right side in the lumbar spine.   Skin:     General: Skin is warm and dry.      Capillary Refill: Capillary refill takes less than 2 seconds.      Coloration: Skin is not jaundiced or pale.      Findings: No bruising, erythema, lesion or rash.   Neurological:      General: No focal deficit present.      Mental Status: She is alert and oriented to person, place, and time.      Cranial Nerves: No cranial nerve deficit.      Sensory: No sensory deficit.      Motor: No weakness.      Coordination: Coordination normal.      Deep Tendon Reflexes: Reflexes normal.   Psychiatric:         Mood and Affect: Mood normal.         Behavior: Behavior normal. Behavior is cooperative.         Thought Content: Thought content normal.         Judgment: Judgment normal.          Labs Reviewed   COMPREHENSIVE METABOLIC PANEL - Abnormal       Result Value    Glucose 85      Sodium 142      Potassium 3.8      Chloride 107      Bicarbonate 30      Anion Gap 9 (*)     Urea Nitrogen 12      Creatinine 0.76      eGFR >90      Calcium 9.1      Albumin 4.3      Alkaline Phosphatase 110      Total Protein 6.8      AST 13       Bilirubin, Total 0.4      ALT 12     HUMAN CHORIONIC GONADOTROPIN, SERUM QUANTITATIVE - Abnormal    HCG, Beta-Quantitative 5 (*)     Narrative:      Total HCG measurement is performed using the [employer]   Immunoassay which detects intact HCG and free beta HCG subunit.    This test is not indicated for use as a tumor marker.   HCG testing is performed using a different test methodology at [facility] than other [facility]. Direct result comparison   should only be made within the same method.       LACTATE - Normal    Lactate 0.8      Narrative:     Venipuncture immediately after or during the administration of Metamizole may lead to falsely low results. Testing should be performed immediately  prior to Metamizole dosing.   LIPASE - Normal    Lipase 61      Narrative:     Venipuncture immediately after or during the administration of Metamizole may lead to falsely low results. Testing should be performed immediately prior to Metamizole dosing.   URINALYSIS WITH REFLEX MICROSCOPIC AND CULTURE - Normal    Color, Urine Yellow      Appearance, Urine Clear      Specific Gravity, Urine 1.019      pH, Urine 6.0      Protein, Urine NEGATIVE      Glucose, Urine NEGATIVE      Blood, Urine NEGATIVE      Ketones, Urine NEGATIVE      Bilirubin, Urine NEGATIVE      Urobilinogen, Urine <2.0      Nitrite, Urine NEGATIVE      Leukocyte Esterase, Urine NEGATIVE     CBC WITH AUTO DIFFERENTIAL    WBC 7.8      nRBC 0.0      RBC 4.69      Hemoglobin 13.8      Hematocrit 42.8      MCV 91      MCH 29.4      MCHC 32.2      RDW 12.9      Platelets 231      Neutrophils % 67.3      Immature Granulocytes %, Automated 0.4      Lymphocytes % 24.4      Monocytes % 7.2      Eosinophils % 0.3      Basophils % 0.4      Neutrophils Absolute 5.23      Immature Granulocytes Absolute, Automated 0.03      Lymphocytes Absolute 1.89      Monocytes Absolute 0.56      Eosinophils Absolute 0.02      Basophils Absolute 0.03      URINALYSIS WITH REFLEX MICROSCOPIC AND CULTURE    Narrative:     The following orders were created for panel order Urinalysis with Reflex Microscopic and Culture.  Procedure                               Abnormality         Status                     ---------                               -----------         ------                     Urinalysis with Reflex M...[[id number]]  Normal              Final result               Extra Urine Gray Tube[[id number]]                            Final result                 Please view results for these tests on the individual orders.   EXTRA URINE GRAY TUBE    Extra Tube Hold for add-ons.          CT abdomen pelvis w IV contrast   Final Result   Punctate stone within the urinary bladder near the LEFT UVJ.  This   appears to be passed from the ureter into the bladder and is   nonobstructing.   Signed by Richi Estrada II, MD           Procedures     Medical Decision Making  Patient was seen and evaluated due to concern for possible kidney stone with associated right-sided back pain and flank pain.  Patient was ordered blood work urinalysis and CT scan of the abdomen pelvis she was also ordered a quantitative hCG which was abnormal at 5.  I did have a discussion with the patient concerning whether or not she was pregnant she states that there is no way in the fact that she was pregnant and she signed paperwork to have the CT scan performed.  CT scan imaging abdomen pelvis with IV contrast revealed evidence of a recently passed kidney stone at the left UVJ.  I then had discussion with the patient concerning her abnormal CT scan findings and she was discharged home with a diagnosis of a recently passed kidney stone I did recommend she have a repeat pregnancy test as an outpatient in a week.         Diagnoses as of 11/28/23 0905   Kidney stone                    Viraj Escalera, DO  11/28/23 0905

## 2023-11-27 NOTE — DISCHARGE INSTRUCTIONS
CT scan of your abdomen and pelvis reveals evidence that you passed a kidney stone today.  I would take a pregnancy test in a week.

## 2023-12-14 ENCOUNTER — LAB (OUTPATIENT)
Dept: LAB | Facility: LAB | Age: 48
End: 2023-12-14
Payer: COMMERCIAL

## 2023-12-14 ENCOUNTER — OFFICE VISIT (OUTPATIENT)
Dept: PRIMARY CARE | Facility: CLINIC | Age: 48
End: 2023-12-14
Payer: COMMERCIAL

## 2023-12-14 VITALS
DIASTOLIC BLOOD PRESSURE: 88 MMHG | WEIGHT: 160 LBS | HEART RATE: 100 BPM | BODY MASS INDEX: 28.35 KG/M2 | HEIGHT: 63 IN | OXYGEN SATURATION: 96 % | SYSTOLIC BLOOD PRESSURE: 138 MMHG

## 2023-12-14 DIAGNOSIS — R79.89 ELEVATED SERUM HCG: Primary | ICD-10-CM

## 2023-12-14 DIAGNOSIS — R79.89 ELEVATED SERUM HCG: ICD-10-CM

## 2023-12-14 LAB — B-HCG SERPL-ACNC: 4 MIU/ML

## 2023-12-14 PROCEDURE — 83002 ASSAY OF GONADOTROPIN (LH): CPT

## 2023-12-14 PROCEDURE — 83001 ASSAY OF GONADOTROPIN (FSH): CPT

## 2023-12-14 PROCEDURE — 84702 CHORIONIC GONADOTROPIN TEST: CPT

## 2023-12-14 PROCEDURE — 36415 COLL VENOUS BLD VENIPUNCTURE: CPT

## 2023-12-14 PROCEDURE — 1036F TOBACCO NON-USER: CPT | Performed by: NURSE PRACTITIONER

## 2023-12-14 PROCEDURE — 99214 OFFICE O/P EST MOD 30 MIN: CPT | Performed by: NURSE PRACTITIONER

## 2023-12-14 ASSESSMENT — ENCOUNTER SYMPTOMS
SPEECH DIFFICULTY: 0
PALPITATIONS: 0
TROUBLE SWALLOWING: 0
FATIGUE: 0
VOMITING: 0
MYALGIAS: 0
NAUSEA: 0
COUGH: 0
CHOKING: 0
WHEEZING: 0
CONSTIPATION: 0
SORE THROAT: 0
FREQUENCY: 0
FACIAL ASYMMETRY: 0
FEVER: 0
JOINT SWELLING: 0
NUMBNESS: 0
BACK PAIN: 0
UNEXPECTED WEIGHT CHANGE: 0
SLEEP DISTURBANCE: 0
EYE PAIN: 0
HEADACHES: 0
NERVOUS/ANXIOUS: 0
ARTHRALGIAS: 0
NECK PAIN: 0
CHILLS: 0
EYE REDNESS: 0
SEIZURES: 0
CHEST TIGHTNESS: 0
WEAKNESS: 0
DYSURIA: 0
FLANK PAIN: 0
SHORTNESS OF BREATH: 0
DIARRHEA: 0
HEMATURIA: 0
ABDOMINAL PAIN: 1
APNEA: 0
ABDOMINAL DISTENTION: 0
BLOOD IN STOOL: 0
DIZZINESS: 0
PHOTOPHOBIA: 0
DIFFICULTY URINATING: 0
WOUND: 0
CONFUSION: 0

## 2023-12-14 ASSESSMENT — PATIENT HEALTH QUESTIONNAIRE - PHQ9
1. LITTLE INTEREST OR PLEASURE IN DOING THINGS: NOT AT ALL
2. FEELING DOWN, DEPRESSED OR HOPELESS: NOT AT ALL
SUM OF ALL RESPONSES TO PHQ9 QUESTIONS 1 AND 2: 0

## 2023-12-14 NOTE — PROGRESS NOTES
"Subjective   Patient ID: Fidelia Santiago is a 48 y.o. female who presents for Follow-up (Pt has not completed labs at this time. ).      HPI:  Presents today for [facility] ER FU FOR ABD PAIN ON 11/27/23. C/O RIGHT PELVIC PAIN ON/OFF  X 3-4 WEEKS  modifying factors consists of HCG LEVEL WAS SLIGHTLY ELEVATED ON 11/27/23.  CT ABD/PELVIS DATED 11/27/23 UNREMARKABLE FOR PAIN. SHE STATES SHE DOES HAVE HER OVARIES TIED AND BURNED.  associated symptoms consist of NO BOWEL OR BLADDER ISSUES  prior treatment consists of medication NONE     Visit Vitals  /88 (BP Location: Right arm)   Pulse 100   Ht 1.6 m (5' 3\")   Wt 72.6 kg (160 lb)   SpO2 96%   BMI 28.34 kg/m²   OB Status Menopausal   Smoking Status Never   BSA 1.8 m²        Review of Systems   Constitutional:  Negative for chills, fatigue, fever and unexpected weight change.   HENT:  Negative for congestion, ear pain, sore throat and trouble swallowing.    Eyes:  Negative for photophobia, pain, redness and visual disturbance.   Respiratory:  Negative for apnea, cough, choking, chest tightness, shortness of breath and wheezing.    Cardiovascular:  Negative for chest pain, palpitations and leg swelling.   Gastrointestinal:  Positive for abdominal pain (RIGHT PELVIC PAIN). Negative for abdominal distention, blood in stool, constipation, diarrhea, nausea and vomiting.   Genitourinary:  Negative for difficulty urinating, dysuria, flank pain, frequency, hematuria and urgency.   Musculoskeletal:  Negative for arthralgias, back pain, gait problem, joint swelling, myalgias and neck pain.   Skin:  Negative for rash and wound.   Neurological:  Negative for dizziness, seizures, syncope, facial asymmetry, speech difficulty, weakness, numbness and headaches.   Psychiatric/Behavioral:  Negative for confusion, sleep disturbance and suicidal ideas. The patient is not nervous/anxious.        Objective     [facility] RECORDS REVIEWED FROM 11/27/23    Physical Exam  Constitutional:       " Appearance: Normal appearance. She is normal weight.   HENT:      Head: Normocephalic.   Eyes:      Extraocular Movements: Extraocular movements intact.      Conjunctiva/sclera: Conjunctivae normal.      Pupils: Pupils are equal, round, and reactive to light.   Cardiovascular:      Rate and Rhythm: Normal rate and regular rhythm.      Pulses: Normal pulses.      Heart sounds: Normal heart sounds.   Pulmonary:      Effort: Pulmonary effort is normal.      Breath sounds: Normal breath sounds.   Abdominal:      General: Bowel sounds are normal.      Tenderness: There is abdominal tenderness (MILD RIGHT PELVIC TENDERNESS).   Musculoskeletal:         General: Normal range of motion.      Cervical back: Normal range of motion.   Skin:     General: Skin is warm and dry.   Neurological:      General: No focal deficit present.      Mental Status: She is alert and oriented to person, place, and time.   Psychiatric:         Mood and Affect: Mood normal.         Behavior: Behavior normal.         Thought Content: Thought content normal.         Judgment: Judgment normal.            Assessment/Plan   Problem List Items Addressed This Visit       Elevated serum hCG - Primary    Relevant Orders    Follicle Stimulating Hormone    Luteinizing Hormone    Human Chorionic Gonadotropin, Serum Quantitative     IF PAIN PERSISTS, GO TO THE ER. I WILL ORDER HCG TO CONFIRM PREGNANCY.  WE DID DISCUSS THE POSSIBILITY OF A TUBAL PREGNANCY AND WE DISCUSSED IN DETAIL THE IMPORTANCE OF GOING TO THE ER. SHE VOICED UNDERSTANDING      WE DISCUSSED MOST COMMON SIDE EFFECTS OF PRESCRIBED MEDICATIONS. INDICATIONS, RISK, COMPLICATIONS, AND ALTERNATIVES OF MEDICATION/THERAPEUTICS WERE EXPLAINED AND DISCUSSED. PLEASE MONITOR CLOSELY FOR ANY UNTOWARD SIDE EFFECTS OR COMPLICATIONS OF MEDICATIONS. PATIENT IS STRONGLY ADVISED TO BE COMPLIANT WITH RECOMMENDATIONS. QUESTIONS AND CONCERNS WERE ADDRESSED. INSTRUCTED TO CALL, RETURN SOONER, OR GO TO THE ER,  IF  SYMPTOMS PERSIST OR WORSEN. THEY VOICED UNDERSTANDING AND  DENIES FURTHER QUESTIONS AT THIS TIME.    TIME CODE  1. PREPARATION FOR PATIENT'S VISIT (REVIEWING CHART, CURRENT MEDICAL RECORDS, OUTSIDE HEALTH PROVIDER RECORDS, PREVIOUS HISTORY, EXAM, TEST, PROCEDURE, AND MEDICATIONS)  2. FACE TO FACE ENCOUNTER OBTAINING HISTORY FROM THE PATIENT/FAMILY/CAREGIVERS; PERFORMING EVALUATION AND EXAMINATION; ORDERING TESTS OR PROCEDURES; REFERRING AND COMMUNICATING WITH OTHER HEALTHCARE PROVIDERS; COUNSELING AND EDUCATION OF THE PATIENT/FAMILY/CAREGIVERS; INDEPENDENTLY INTERPRETING RESULTS (TESTS, LABS, PROCEDURES, IMAGING) AND COMMUNICATING AND EXPLAINING RESULTS TO THE PATIENT/FAMILY/CAREGIVERS  3. COORDINATION OF CARE; PREPARING AND PRINTING DISCHARGE INSTRUCTIONS AND ANY EDUCATIONAL MATERIAL FOR THE PATIENT/FAMILY/CAREGIVERS. DOCUMENTING CLINICAL INFORMATION IN THE ELECTRONIC MEDICAL RECORD   4. REVIEWING OARRS AS NEEDED    MDM  1) COMPLEXITY: 1 ACUTE ILLNESS, 1 STABLE CHRONIC CONDITION, OR 2 MINOR PROBLEMS ADDRESSED   2)DATA: TESTS INTERPRETED AND OR ORDERED, TOOK INDEPENDENT HISTORY OR RECORDS REVIEWED  3)RISK: LOW RISK DUE TO NATURE OF MEDICAL CONDITIONS/COMORBIDITY OR MEDICATIONS ORDERED OR SURGICAL OR PROCEDURE REFERRAL    2 WEEKS

## 2023-12-15 LAB
FSH SERPL-ACNC: 58.2 IU/L
LH SERPL-ACNC: 33.9 IU/L

## 2023-12-28 ENCOUNTER — APPOINTMENT (OUTPATIENT)
Dept: PRIMARY CARE | Facility: CLINIC | Age: 48
End: 2023-12-28
Payer: COMMERCIAL

## 2023-12-29 ENCOUNTER — APPOINTMENT (OUTPATIENT)
Dept: PRIMARY CARE | Facility: CLINIC | Age: 48
End: 2023-12-29
Payer: COMMERCIAL

## 2024-01-04 DIAGNOSIS — Z00.00 HEALTH CARE MAINTENANCE: Primary | ICD-10-CM

## 2024-01-04 RX ORDER — POLYETHYLENE GLYCOL 3350 17 G/17G
238 POWDER, FOR SOLUTION ORAL DAILY
COMMUNITY
End: 2024-01-04 | Stop reason: SDUPTHER

## 2024-01-04 RX ORDER — QUETIAPINE FUMARATE 100 MG/1
TABLET, FILM COATED ORAL
COMMUNITY
Start: 2023-12-19

## 2024-01-04 RX ORDER — POLYETHYLENE GLYCOL 3350 17 G/17G
POWDER, FOR SOLUTION ORAL
Qty: 238 PACKET | Refills: 0 | Status: SHIPPED | OUTPATIENT
Start: 2024-01-04

## 2024-01-04 RX ORDER — BISACODYL 5 MG
5 TABLET, DELAYED RELEASE (ENTERIC COATED) ORAL 2 TIMES DAILY
COMMUNITY
End: 2024-01-04 | Stop reason: SDUPTHER

## 2024-01-04 RX ORDER — BISACODYL 5 MG
5 TABLET, DELAYED RELEASE (ENTERIC COATED) ORAL 2 TIMES DAILY
Qty: 4 TABLET | Refills: 0 | Status: SHIPPED | OUTPATIENT
Start: 2024-01-04

## 2024-01-04 NOTE — TELEPHONE ENCOUNTER
PT WOULD LIKE TO KNOW IF YOU CAN PRESCRIBE HER THE PREP KIT FOR HER UPCOMING COLONOSCOPY. PT STATES SHE DOES NOT HAVE THE MONEY TO PURCHASE IT OVER THE COUNTER AT THIS TIME . PLEASE ADVISE    No

## 2024-01-10 ENCOUNTER — PREP FOR PROCEDURE (OUTPATIENT)
Dept: PRIMARY CARE | Facility: CLINIC | Age: 49
End: 2024-01-10
Payer: COMMERCIAL

## 2024-01-10 RX ORDER — SODIUM CHLORIDE, SODIUM LACTATE, POTASSIUM CHLORIDE, CALCIUM CHLORIDE 600; 310; 30; 20 MG/100ML; MG/100ML; MG/100ML; MG/100ML
20 INJECTION, SOLUTION INTRAVENOUS CONTINUOUS
OUTPATIENT
Start: 2024-01-10

## 2024-01-11 ENCOUNTER — APPOINTMENT (OUTPATIENT)
Dept: GASTROENTEROLOGY | Facility: CLINIC | Age: 49
End: 2024-01-11
Payer: COMMERCIAL

## 2024-01-11 ENCOUNTER — TELEPHONE (OUTPATIENT)
Dept: PRIMARY CARE | Facility: CLINIC | Age: 49
End: 2024-01-11
Payer: COMMERCIAL

## 2024-08-21 ENCOUNTER — APPOINTMENT (OUTPATIENT)
Dept: RADIOLOGY | Facility: HOSPITAL | Age: 49
End: 2024-08-21
Payer: COMMERCIAL

## 2024-08-21 ENCOUNTER — HOSPITAL ENCOUNTER (INPATIENT)
Facility: HOSPITAL | Age: 49
End: 2024-08-21
Attending: EMERGENCY MEDICINE | Admitting: HOSPITALIST
Payer: COMMERCIAL

## 2024-08-21 ENCOUNTER — APPOINTMENT (OUTPATIENT)
Dept: CARDIOLOGY | Facility: HOSPITAL | Age: 49
End: 2024-08-21
Payer: COMMERCIAL

## 2024-08-21 DIAGNOSIS — E87.20 LACTIC ACIDEMIA: ICD-10-CM

## 2024-08-21 DIAGNOSIS — M62.82 NON-TRAUMATIC RHABDOMYOLYSIS: ICD-10-CM

## 2024-08-21 DIAGNOSIS — G93.1 ANOXIC BRAIN INJURY: Primary | ICD-10-CM

## 2024-08-21 DIAGNOSIS — J69.0 ASPIRATION PNEUMONIA OF BOTH LOWER LOBES, UNSPECIFIED ASPIRATION PNEUMONIA TYPE (MULTI): ICD-10-CM

## 2024-08-21 DIAGNOSIS — R74.8 ELEVATED LIVER ENZYMES: ICD-10-CM

## 2024-08-21 DIAGNOSIS — N17.9 ACUTE RENAL FAILURE, UNSPECIFIED ACUTE RENAL FAILURE TYPE (CMS-HCC): ICD-10-CM

## 2024-08-21 DIAGNOSIS — E87.5 HYPERKALEMIA: ICD-10-CM

## 2024-08-21 DIAGNOSIS — R79.89 ELEVATED TROPONIN: ICD-10-CM

## 2024-08-21 DIAGNOSIS — R41.89 UNRESPONSIVENESS: ICD-10-CM

## 2024-08-21 LAB
ALBUMIN SERPL BCP-MCNC: 4.2 G/DL (ref 3.4–5)
ALP SERPL-CCNC: 216 U/L (ref 33–110)
ALT SERPL W P-5'-P-CCNC: 1750 U/L (ref 7–45)
AMPHETAMINES UR QL SCN: NORMAL
ANION GAP SERPL CALC-SCNC: 16 MMOL/L
APAP SERPL-MCNC: <10 UG/ML
APPEARANCE UR: ABNORMAL
ARTERIAL PATENCY WRIST A: POSITIVE
ARTERIAL PATENCY WRIST A: POSITIVE
AST SERPL W P-5'-P-CCNC: 2674 U/L (ref 9–39)
B-HCG SERPL-ACNC: 3 MIU/ML
BACTERIA #/AREA URNS AUTO: ABNORMAL /HPF
BARBITURATES UR QL SCN: NORMAL
BASE EXCESS BLDA CALC-SCNC: -10.8 MMOL/L (ref -2–3)
BASE EXCESS BLDA CALC-SCNC: -9.2 MMOL/L (ref -2–3)
BASOPHILS # BLD AUTO: 0.02 X10*3/UL (ref 0–0.1)
BASOPHILS NFR BLD AUTO: 0.2 %
BENZODIAZ UR QL SCN: NORMAL
BILIRUB SERPL-MCNC: 1 MG/DL (ref 0–1.2)
BILIRUB UR STRIP.AUTO-MCNC: NEGATIVE MG/DL
BODY TEMPERATURE: 37 DEGREES CELSIUS
BODY TEMPERATURE: 37 DEGREES CELSIUS
BUN SERPL-MCNC: 39 MG/DL (ref 6–23)
BZE UR QL SCN: NORMAL
CALCIUM SERPL-MCNC: 7.3 MG/DL (ref 8.6–10.3)
CANNABINOIDS UR QL SCN: NORMAL
CAOX CRY #/AREA UR COMP ASSIST: ABNORMAL /HPF
CARDIAC TROPONIN I PNL SERPL HS: 670 NG/L (ref 0–13)
CARDIAC TROPONIN I PNL SERPL HS: 885 NG/L (ref 0–13)
CHLORIDE SERPL-SCNC: 104 MMOL/L (ref 98–107)
CK SERPL-CCNC: 1830 U/L (ref 0–215)
CO2 SERPL-SCNC: 25 MMOL/L (ref 21–32)
COLOR UR: YELLOW
CREAT SERPL-MCNC: 2.94 MG/DL (ref 0.5–1.05)
EGFRCR SERPLBLD CKD-EPI 2021: 19 ML/MIN/1.73M*2
EOSINOPHIL # BLD AUTO: 0 X10*3/UL (ref 0–0.7)
EOSINOPHIL NFR BLD AUTO: 0 %
ERYTHROCYTE [DISTWIDTH] IN BLOOD BY AUTOMATED COUNT: 13 % (ref 11.5–14.5)
ETHANOL SERPL-MCNC: <10 MG/DL
FENTANYL+NORFENTANYL UR QL SCN: NORMAL
GLUCOSE BLD MANUAL STRIP-MCNC: 146 MG/DL (ref 74–99)
GLUCOSE SERPL-MCNC: 123 MG/DL (ref 74–99)
GLUCOSE UR STRIP.AUTO-MCNC: NORMAL MG/DL
HCO3 BLDA-SCNC: 19.6 MMOL/L (ref 22–26)
HCO3 BLDA-SCNC: 20.2 MMOL/L (ref 22–26)
HCT VFR BLD AUTO: 48.9 % (ref 36–46)
HGB BLD-MCNC: 15 G/DL (ref 12–16)
HOLD SPECIMEN: NORMAL
HOLD SPECIMEN: NORMAL
IMM GRANULOCYTES # BLD AUTO: 0.04 X10*3/UL (ref 0–0.7)
IMM GRANULOCYTES NFR BLD AUTO: 0.3 % (ref 0–0.9)
INHALED O2 CONCENTRATION: 50 %
INHALED O2 CONCENTRATION: 50 %
KETONES UR STRIP.AUTO-MCNC: NEGATIVE MG/DL
LACTATE SERPL-SCNC: 2.4 MMOL/L (ref 0.4–2)
LACTATE SERPL-SCNC: 2.9 MMOL/L (ref 0.4–2)
LEUKOCYTE ESTERASE UR QL STRIP.AUTO: NEGATIVE
LYMPHOCYTES # BLD AUTO: 1.21 X10*3/UL (ref 1.2–4.8)
LYMPHOCYTES NFR BLD AUTO: 9.3 %
MAGNESIUM SERPL-MCNC: 2.26 MG/DL (ref 1.6–2.4)
MCH RBC QN AUTO: 30 PG (ref 26–34)
MCHC RBC AUTO-ENTMCNC: 30.7 G/DL (ref 32–36)
MCV RBC AUTO: 98 FL (ref 80–100)
METHADONE UR QL SCN: NORMAL
MONOCYTES # BLD AUTO: 0.93 X10*3/UL (ref 0.1–1)
MONOCYTES NFR BLD AUTO: 7.2 %
MUCOUS THREADS #/AREA URNS AUTO: ABNORMAL /LPF
NEUTROPHILS # BLD AUTO: 10.76 X10*3/UL (ref 1.2–7.7)
NEUTROPHILS NFR BLD AUTO: 83 %
NITRITE UR QL STRIP.AUTO: NEGATIVE
NRBC BLD-RTO: 0 /100 WBCS (ref 0–0)
OPIATES UR QL SCN: NORMAL
OXYCODONE+OXYMORPHONE UR QL SCN: NORMAL
OXYHGB MFR BLDA: 91.8 % (ref 94–98)
OXYHGB MFR BLDA: 93.4 % (ref 94–98)
PCO2 BLDA: 58 MM HG (ref 38–42)
PCO2 BLDA: 63 MM HG (ref 38–42)
PCP UR QL SCN: NORMAL
PEEP CMH2O: 5 CM H2O
PH BLDA: 7.1 PH (ref 7.38–7.42)
PH BLDA: 7.15 PH (ref 7.38–7.42)
PH UR STRIP.AUTO: 5.5 [PH]
PLATELET # BLD AUTO: 189 X10*3/UL (ref 150–450)
PO2 BLDA: 69 MM HG (ref 85–95)
PO2 BLDA: 80 MM HG (ref 85–95)
POTASSIUM SERPL-SCNC: 7.4 MMOL/L (ref 3.5–5.3)
PROT SERPL-MCNC: 6.6 G/DL (ref 6.4–8.2)
PROT UR STRIP.AUTO-MCNC: ABNORMAL MG/DL
RBC # BLD AUTO: 5 X10*6/UL (ref 4–5.2)
RBC # UR STRIP.AUTO: ABNORMAL /UL
RBC #/AREA URNS AUTO: ABNORMAL /HPF
SALICYLATES SERPL-MCNC: <3 MG/DL
SAO2 % BLDA: 94 % (ref 94–100)
SAO2 % BLDA: 96 % (ref 94–100)
SODIUM SERPL-SCNC: 138 MMOL/L (ref 136–145)
SP GR UR STRIP.AUTO: 1.02
SPECIMEN DRAWN FROM PATIENT: ABNORMAL
SPECIMEN DRAWN FROM PATIENT: ABNORMAL
TIDAL VOLUME: 350 ML
TIDAL VOLUME: 350 ML
UROBILINOGEN UR STRIP.AUTO-MCNC: NORMAL MG/DL
VENTILATOR MODE: ABNORMAL
VENTILATOR MODE: ABNORMAL
VENTILATOR RATE: 12 BPM
VENTILATOR RATE: 16 BPM
WBC # BLD AUTO: 13 X10*3/UL (ref 4.4–11.3)
WBC #/AREA URNS AUTO: ABNORMAL /HPF
WBC CLUMPS #/AREA URNS AUTO: ABNORMAL /HPF

## 2024-08-21 PROCEDURE — 94002 VENT MGMT INPAT INIT DAY: CPT

## 2024-08-21 PROCEDURE — 71250 CT THORAX DX C-: CPT | Performed by: RADIOLOGY

## 2024-08-21 PROCEDURE — 2500000005 HC RX 250 GENERAL PHARMACY W/O HCPCS: Performed by: EMERGENCY MEDICINE

## 2024-08-21 PROCEDURE — 94799 UNLISTED PULMONARY SVC/PX: CPT

## 2024-08-21 PROCEDURE — 85025 COMPLETE CBC W/AUTO DIFF WBC: CPT | Performed by: EMERGENCY MEDICINE

## 2024-08-21 PROCEDURE — 74176 CT ABD & PELVIS W/O CONTRAST: CPT | Performed by: RADIOLOGY

## 2024-08-21 PROCEDURE — 2500000002 HC RX 250 W HCPCS SELF ADMINISTERED DRUGS (ALT 637 FOR MEDICARE OP, ALT 636 FOR OP/ED): Performed by: EMERGENCY MEDICINE

## 2024-08-21 PROCEDURE — 96375 TX/PRO/DX INJ NEW DRUG ADDON: CPT | Mod: 59

## 2024-08-21 PROCEDURE — 5A1955Z RESPIRATORY VENTILATION, GREATER THAN 96 CONSECUTIVE HOURS: ICD-10-PCS | Performed by: INTERNAL MEDICINE

## 2024-08-21 PROCEDURE — 36415 COLL VENOUS BLD VENIPUNCTURE: CPT | Performed by: EMERGENCY MEDICINE

## 2024-08-21 PROCEDURE — 72125 CT NECK SPINE W/O DYE: CPT

## 2024-08-21 PROCEDURE — 94664 DEMO&/EVAL PT USE INHALER: CPT

## 2024-08-21 PROCEDURE — 99292 CRITICAL CARE ADDL 30 MIN: CPT | Performed by: EMERGENCY MEDICINE

## 2024-08-21 PROCEDURE — 82805 BLOOD GASES W/O2 SATURATION: CPT | Performed by: EMERGENCY MEDICINE

## 2024-08-21 PROCEDURE — 9420000001 HC RT PATIENT EDUCATION 5 MIN

## 2024-08-21 PROCEDURE — 93005 ELECTROCARDIOGRAM TRACING: CPT

## 2024-08-21 PROCEDURE — 74018 RADEX ABDOMEN 1 VIEW: CPT

## 2024-08-21 PROCEDURE — 2500000004 HC RX 250 GENERAL PHARMACY W/ HCPCS (ALT 636 FOR OP/ED)

## 2024-08-21 PROCEDURE — 96367 TX/PROPH/DG ADDL SEQ IV INF: CPT | Mod: 59

## 2024-08-21 PROCEDURE — 2500000002 HC RX 250 W HCPCS SELF ADMINISTERED DRUGS (ALT 637 FOR MEDICARE OP, ALT 636 FOR OP/ED)

## 2024-08-21 PROCEDURE — 74176 CT ABD & PELVIS W/O CONTRAST: CPT

## 2024-08-21 PROCEDURE — 81001 URINALYSIS AUTO W/SCOPE: CPT | Performed by: EMERGENCY MEDICINE

## 2024-08-21 PROCEDURE — 2500000004 HC RX 250 GENERAL PHARMACY W/ HCPCS (ALT 636 FOR OP/ED): Performed by: EMERGENCY MEDICINE

## 2024-08-21 PROCEDURE — 80307 DRUG TEST PRSMV CHEM ANLYZR: CPT | Performed by: EMERGENCY MEDICINE

## 2024-08-21 PROCEDURE — 87040 BLOOD CULTURE FOR BACTERIA: CPT | Mod: SAMLAB | Performed by: EMERGENCY MEDICINE

## 2024-08-21 PROCEDURE — 84484 ASSAY OF TROPONIN QUANT: CPT | Performed by: EMERGENCY MEDICINE

## 2024-08-21 PROCEDURE — 82077 ASSAY SPEC XCP UR&BREATH IA: CPT | Performed by: EMERGENCY MEDICINE

## 2024-08-21 PROCEDURE — 71045 X-RAY EXAM CHEST 1 VIEW: CPT | Performed by: RADIOLOGY

## 2024-08-21 PROCEDURE — 82947 ASSAY GLUCOSE BLOOD QUANT: CPT

## 2024-08-21 PROCEDURE — 71045 X-RAY EXAM CHEST 1 VIEW: CPT

## 2024-08-21 PROCEDURE — 83735 ASSAY OF MAGNESIUM: CPT | Performed by: EMERGENCY MEDICINE

## 2024-08-21 PROCEDURE — 83605 ASSAY OF LACTIC ACID: CPT | Performed by: EMERGENCY MEDICINE

## 2024-08-21 PROCEDURE — 72125 CT NECK SPINE W/O DYE: CPT | Performed by: RADIOLOGY

## 2024-08-21 PROCEDURE — 51702 INSERT TEMP BLADDER CATH: CPT

## 2024-08-21 PROCEDURE — 80053 COMPREHEN METABOLIC PANEL: CPT | Performed by: EMERGENCY MEDICINE

## 2024-08-21 PROCEDURE — 96365 THER/PROPH/DIAG IV INF INIT: CPT | Mod: 59

## 2024-08-21 PROCEDURE — 80143 DRUG ASSAY ACETAMINOPHEN: CPT | Performed by: EMERGENCY MEDICINE

## 2024-08-21 PROCEDURE — 84702 CHORIONIC GONADOTROPIN TEST: CPT | Performed by: EMERGENCY MEDICINE

## 2024-08-21 PROCEDURE — 70450 CT HEAD/BRAIN W/O DYE: CPT

## 2024-08-21 PROCEDURE — 70450 CT HEAD/BRAIN W/O DYE: CPT | Performed by: RADIOLOGY

## 2024-08-21 PROCEDURE — 80179 DRUG ASSAY SALICYLATE: CPT | Performed by: EMERGENCY MEDICINE

## 2024-08-21 PROCEDURE — 36600 WITHDRAWAL OF ARTERIAL BLOOD: CPT

## 2024-08-21 PROCEDURE — 94760 N-INVAS EAR/PLS OXIMETRY 1: CPT

## 2024-08-21 PROCEDURE — 82550 ASSAY OF CK (CPK): CPT | Performed by: EMERGENCY MEDICINE

## 2024-08-21 PROCEDURE — 94640 AIRWAY INHALATION TREATMENT: CPT

## 2024-08-21 PROCEDURE — 31720 CLEARANCE OF AIRWAYS: CPT

## 2024-08-21 RX ORDER — SODIUM BICARBONATE 1 MEQ/ML
50 SYRINGE (ML) INTRAVENOUS ONCE
Status: COMPLETED | OUTPATIENT
Start: 2024-08-21 | End: 2024-08-21

## 2024-08-21 RX ORDER — DEXTROSE 50 % IN WATER (D50W) INTRAVENOUS SYRINGE
25 ONCE
Status: COMPLETED | OUTPATIENT
Start: 2024-08-21 | End: 2024-08-21

## 2024-08-21 RX ORDER — CEFTRIAXONE 2 G/50ML
2 INJECTION, SOLUTION INTRAVENOUS ONCE
Status: COMPLETED | OUTPATIENT
Start: 2024-08-21 | End: 2024-08-21

## 2024-08-21 RX ORDER — PROPOFOL 10 MG/ML
5-20 INJECTION, EMULSION INTRAVENOUS CONTINUOUS
Status: DISCONTINUED | OUTPATIENT
Start: 2024-08-21 | End: 2024-08-21

## 2024-08-21 RX ORDER — CALCIUM GLUCONATE 20 MG/ML
2 INJECTION, SOLUTION INTRAVENOUS ONCE
Status: COMPLETED | OUTPATIENT
Start: 2024-08-21 | End: 2024-08-21

## 2024-08-21 RX ORDER — ALBUTEROL SULFATE 0.83 MG/ML
SOLUTION RESPIRATORY (INHALATION)
Status: COMPLETED
Start: 2024-08-21 | End: 2024-08-21

## 2024-08-21 RX ORDER — ALBUTEROL SULFATE 0.83 MG/ML
2.5 SOLUTION RESPIRATORY (INHALATION) ONCE
Status: COMPLETED | OUTPATIENT
Start: 2024-08-21 | End: 2024-08-21

## 2024-08-21 RX ORDER — PROPOFOL 10 MG/ML
5-20 INJECTION, EMULSION INTRAVENOUS CONTINUOUS
Status: DISCONTINUED | OUTPATIENT
Start: 2024-08-21 | End: 2024-08-22

## 2024-08-21 RX ORDER — SODIUM CHLORIDE 9 MG/ML
150 INJECTION, SOLUTION INTRAVENOUS CONTINUOUS
Status: DISCONTINUED | OUTPATIENT
Start: 2024-08-21 | End: 2024-08-22

## 2024-08-21 RX ORDER — NALOXONE HYDROCHLORIDE 1 MG/ML
2 INJECTION INTRAMUSCULAR; INTRAVENOUS; SUBCUTANEOUS ONCE
Status: COMPLETED | OUTPATIENT
Start: 2024-08-21 | End: 2024-08-21

## 2024-08-21 RX ORDER — ALBUTEROL SULFATE 5 MG/ML
10 SOLUTION RESPIRATORY (INHALATION) ONCE
Status: DISCONTINUED | OUTPATIENT
Start: 2024-08-21 | End: 2024-08-21

## 2024-08-21 RX ORDER — PROPOFOL 10 MG/ML
INJECTION, EMULSION INTRAVENOUS
Status: COMPLETED
Start: 2024-08-21 | End: 2024-08-21

## 2024-08-21 ASSESSMENT — PAIN - FUNCTIONAL ASSESSMENT: PAIN_FUNCTIONAL_ASSESSMENT: UNABLE TO SELF-REPORT

## 2024-08-22 ENCOUNTER — APPOINTMENT (OUTPATIENT)
Dept: CARDIOLOGY | Facility: HOSPITAL | Age: 49
End: 2024-08-22
Payer: COMMERCIAL

## 2024-08-22 LAB
ALBUMIN SERPL BCP-MCNC: 3.1 G/DL (ref 3.4–5)
ALBUMIN SERPL BCP-MCNC: 3.5 G/DL (ref 3.4–5)
ALP SERPL-CCNC: 156 U/L (ref 33–110)
ALP SERPL-CCNC: 191 U/L (ref 33–110)
ALT SERPL W P-5'-P-CCNC: 1006 U/L (ref 7–45)
ALT SERPL W P-5'-P-CCNC: 1376 U/L (ref 7–45)
AMMONIA PLAS-SCNC: 36 UMOL/L (ref 16–53)
AMORPH CRY #/AREA UR COMP ASSIST: ABNORMAL /HPF
AMPHETAMINES UR QL SCN: ABNORMAL
ANION GAP SERPL CALC-SCNC: 15 MMOL/L (ref 10–20)
ANION GAP SERPL CALC-SCNC: 17 MMOL/L (ref 10–20)
AORTIC VALVE MEAN GRADIENT: 3 MMHG
AORTIC VALVE PEAK VELOCITY: 1.21 M/S
APPEARANCE UR: ABNORMAL
AST SERPL W P-5'-P-CCNC: 1494 U/L (ref 9–39)
AST SERPL W P-5'-P-CCNC: 757 U/L (ref 9–39)
AV PEAK GRADIENT: 5.9 MMHG
AVA (PEAK VEL): 1.3 CM2
AVA (VTI): 1.04 CM2
BACTERIA #/AREA URNS AUTO: ABNORMAL /HPF
BARBITURATES UR QL SCN: ABNORMAL
BASE EXCESS BLDA CALC-SCNC: -1.8 MMOL/L (ref -2–3)
BASE EXCESS BLDA CALC-SCNC: -6.6 MMOL/L (ref -2–3)
BENZODIAZ UR QL SCN: ABNORMAL
BILIRUB SERPL-MCNC: 0.5 MG/DL (ref 0–1.2)
BILIRUB SERPL-MCNC: 0.8 MG/DL (ref 0–1.2)
BILIRUB UR STRIP.AUTO-MCNC: NEGATIVE MG/DL
BODY TEMPERATURE: 37 DEGREES CELSIUS
BODY TEMPERATURE: 37 DEGREES CELSIUS
BUN SERPL-MCNC: 45 MG/DL (ref 6–23)
BUN SERPL-MCNC: 49 MG/DL (ref 6–23)
BZE UR QL SCN: ABNORMAL
CALCIUM SERPL-MCNC: 7.6 MG/DL (ref 8.6–10.3)
CALCIUM SERPL-MCNC: 7.8 MG/DL (ref 8.6–10.3)
CANNABINOIDS UR QL SCN: ABNORMAL
CHLORIDE SERPL-SCNC: 107 MMOL/L (ref 98–107)
CHLORIDE SERPL-SCNC: 107 MMOL/L (ref 98–107)
CK SERPL-CCNC: 1811 U/L (ref 0–215)
CO2 SERPL-SCNC: 21 MMOL/L (ref 21–32)
CO2 SERPL-SCNC: 23 MMOL/L (ref 21–32)
COLOR UR: ABNORMAL
CREAT SERPL-MCNC: 3.4 MG/DL (ref 0.5–1.05)
CREAT SERPL-MCNC: 4.17 MG/DL (ref 0.5–1.05)
EGFRCR SERPLBLD CKD-EPI 2021: 12 ML/MIN/1.73M*2
EGFRCR SERPLBLD CKD-EPI 2021: 16 ML/MIN/1.73M*2
EJECTION FRACTION APICAL 4 CHAMBER: 47.1
EJECTION FRACTION: 40 %
ERYTHROCYTE [DISTWIDTH] IN BLOOD BY AUTOMATED COUNT: 13 % (ref 11.5–14.5)
FENTANYL+NORFENTANYL UR QL SCN: ABNORMAL
GLUCOSE SERPL-MCNC: 147 MG/DL (ref 74–99)
GLUCOSE SERPL-MCNC: 98 MG/DL (ref 74–99)
GLUCOSE UR STRIP.AUTO-MCNC: ABNORMAL MG/DL
HCO3 BLDA-SCNC: 19.1 MMOL/L (ref 22–26)
HCO3 BLDA-SCNC: 23.1 MMOL/L (ref 22–26)
HCT VFR BLD AUTO: 46.9 % (ref 36–46)
HGB BLD-MCNC: 15.3 G/DL (ref 12–16)
HOLD SPECIMEN: NORMAL
INHALED O2 CONCENTRATION: 28 %
INHALED O2 CONCENTRATION: 30 %
KETONES UR STRIP.AUTO-MCNC: NEGATIVE MG/DL
LACTATE SERPL-SCNC: 2 MMOL/L (ref 0.4–2)
LEFT ATRIUM VOLUME AREA LENGTH INDEX BSA: 14.3 ML/M2
LEFT VENTRICLE INTERNAL DIMENSION DIASTOLE: 3.79 CM (ref 3.5–6)
LEFT VENTRICULAR OUTFLOW TRACT DIAMETER: 1.5 CM
LEUKOCYTE ESTERASE UR QL STRIP.AUTO: NEGATIVE
LV EJECTION FRACTION BIPLANE: 49 %
MCH RBC QN AUTO: 30.1 PG (ref 26–34)
MCHC RBC AUTO-ENTMCNC: 32.6 G/DL (ref 32–36)
MCV RBC AUTO: 92 FL (ref 80–100)
METHADONE UR QL SCN: ABNORMAL
MITRAL VALVE E/A RATIO: 1.03
MUCOUS THREADS #/AREA URNS AUTO: ABNORMAL /LPF
NITRITE UR QL STRIP.AUTO: NEGATIVE
NRBC BLD-RTO: 0 /100 WBCS (ref 0–0)
OPIATES UR QL SCN: ABNORMAL
OXYCODONE+OXYMORPHONE UR QL SCN: ABNORMAL
OXYHGB MFR BLDA: 95.7 % (ref 94–98)
OXYHGB MFR BLDA: 96.4 % (ref 94–98)
PCO2 BLDA: 38 MM HG (ref 38–42)
PCO2 BLDA: 39 MM HG (ref 38–42)
PCP UR QL SCN: ABNORMAL
PEEP CMH2O: 5 CM H2O
PEEP CMH2O: 5 CM H2O
PH BLDA: 7.31 PH (ref 7.38–7.42)
PH BLDA: 7.38 PH (ref 7.38–7.42)
PH UR STRIP.AUTO: 6 [PH]
PLATELET # BLD AUTO: 167 X10*3/UL (ref 150–450)
PO2 BLDA: 81 MM HG (ref 85–95)
PO2 BLDA: 84 MM HG (ref 85–95)
POTASSIUM SERPL-SCNC: 4.2 MMOL/L (ref 3.5–5.3)
POTASSIUM SERPL-SCNC: 5 MMOL/L (ref 3.5–5.3)
PROT SERPL-MCNC: 5.2 G/DL (ref 6.4–8.2)
PROT SERPL-MCNC: 5.8 G/DL (ref 6.4–8.2)
PROT UR STRIP.AUTO-MCNC: ABNORMAL MG/DL
RBC # BLD AUTO: 5.08 X10*6/UL (ref 4–5.2)
RBC # UR STRIP.AUTO: ABNORMAL /UL
RBC #/AREA URNS AUTO: >20 /HPF
RIGHT VENTRICLE PEAK SYSTOLIC PRESSURE: 23.6 MMHG
SAO2 % BLDA: 98 % (ref 94–100)
SAO2 % BLDA: 98 % (ref 94–100)
SODIUM SERPL-SCNC: 140 MMOL/L (ref 136–145)
SODIUM SERPL-SCNC: 141 MMOL/L (ref 136–145)
SP GR UR STRIP.AUTO: 1.01
SQUAMOUS #/AREA URNS AUTO: ABNORMAL /HPF
TIDAL VOLUME: 350 ML
TIDAL VOLUME: 350 ML
TRICUSPID ANNULAR PLANE SYSTOLIC EXCURSION: 1.1 CM
UROBILINOGEN UR STRIP.AUTO-MCNC: NORMAL MG/DL
VENTILATOR MODE: ABNORMAL
VENTILATOR MODE: ABNORMAL
VENTILATOR RATE: 12 BPM
VENTILATOR RATE: 16 BPM
WBC # BLD AUTO: 14.7 X10*3/UL (ref 4.4–11.3)
WBC #/AREA URNS AUTO: ABNORMAL /HPF

## 2024-08-22 PROCEDURE — 93308 TTE F-UP OR LMTD: CPT | Performed by: STUDENT IN AN ORGANIZED HEALTH CARE EDUCATION/TRAINING PROGRAM

## 2024-08-22 PROCEDURE — 80307 DRUG TEST PRSMV CHEM ANLYZR: CPT | Performed by: HOSPITALIST

## 2024-08-22 PROCEDURE — 2500000004 HC RX 250 GENERAL PHARMACY W/ HCPCS (ALT 636 FOR OP/ED): Performed by: INTERNAL MEDICINE

## 2024-08-22 PROCEDURE — 94002 VENT MGMT INPAT INIT DAY: CPT

## 2024-08-22 PROCEDURE — 82140 ASSAY OF AMMONIA: CPT | Performed by: HOSPITALIST

## 2024-08-22 PROCEDURE — 31720 CLEARANCE OF AIRWAYS: CPT

## 2024-08-22 PROCEDURE — 94762 N-INVAS EAR/PLS OXIMTRY CONT: CPT

## 2024-08-22 PROCEDURE — 99291 CRITICAL CARE FIRST HOUR: CPT | Performed by: INTERNAL MEDICINE

## 2024-08-22 PROCEDURE — 51700 IRRIGATION OF BLADDER: CPT

## 2024-08-22 PROCEDURE — 94003 VENT MGMT INPAT SUBQ DAY: CPT

## 2024-08-22 PROCEDURE — 2500000004 HC RX 250 GENERAL PHARMACY W/ HCPCS (ALT 636 FOR OP/ED)

## 2024-08-22 PROCEDURE — 80053 COMPREHEN METABOLIC PANEL: CPT | Performed by: INTERNAL MEDICINE

## 2024-08-22 PROCEDURE — 82805 BLOOD GASES W/O2 SATURATION: CPT | Performed by: INTERNAL MEDICINE

## 2024-08-22 PROCEDURE — 2500000001 HC RX 250 WO HCPCS SELF ADMINISTERED DRUGS (ALT 637 FOR MEDICARE OP): Performed by: INTERNAL MEDICINE

## 2024-08-22 PROCEDURE — 85027 COMPLETE CBC AUTOMATED: CPT | Performed by: INTERNAL MEDICINE

## 2024-08-22 PROCEDURE — 36415 COLL VENOUS BLD VENIPUNCTURE: CPT | Performed by: INTERNAL MEDICINE

## 2024-08-22 PROCEDURE — 93308 TTE F-UP OR LMTD: CPT

## 2024-08-22 PROCEDURE — 36600 WITHDRAWAL OF ARTERIAL BLOOD: CPT

## 2024-08-22 PROCEDURE — 94681 O2 UPTK CO2 OUTP % O2 XTRC: CPT

## 2024-08-22 PROCEDURE — 2020000001 HC ICU ROOM DAILY

## 2024-08-22 PROCEDURE — 2500000005 HC RX 250 GENERAL PHARMACY W/O HCPCS: Performed by: INTERNAL MEDICINE

## 2024-08-22 PROCEDURE — 94799 UNLISTED PULMONARY SVC/PX: CPT

## 2024-08-22 PROCEDURE — 83605 ASSAY OF LACTIC ACID: CPT | Performed by: INTERNAL MEDICINE

## 2024-08-22 PROCEDURE — 82550 ASSAY OF CK (CPK): CPT | Performed by: INTERNAL MEDICINE

## 2024-08-22 PROCEDURE — 99291 CRITICAL CARE FIRST HOUR: CPT | Performed by: STUDENT IN AN ORGANIZED HEALTH CARE EDUCATION/TRAINING PROGRAM

## 2024-08-22 PROCEDURE — 36415 COLL VENOUS BLD VENIPUNCTURE: CPT | Performed by: HOSPITALIST

## 2024-08-22 PROCEDURE — 81001 URINALYSIS AUTO W/SCOPE: CPT | Performed by: INTERNAL MEDICINE

## 2024-08-22 PROCEDURE — 2500000005 HC RX 250 GENERAL PHARMACY W/O HCPCS: Performed by: EMERGENCY MEDICINE

## 2024-08-22 RX ORDER — SODIUM POLYSTYRENE SULFONATE 15 G/60ML
30 SUSPENSION ORAL; RECTAL ONCE
Status: COMPLETED | OUTPATIENT
Start: 2024-08-22 | End: 2024-08-22

## 2024-08-22 RX ORDER — SODIUM CHLORIDE 9 MG/ML
1000 INJECTION, SOLUTION INTRAVENOUS CONTINUOUS
Status: ACTIVE | OUTPATIENT
Start: 2024-08-22 | End: 2024-08-22

## 2024-08-22 RX ORDER — TRIHEXYPHENIDYL HYDROCHLORIDE 2 MG/1
2 TABLET ORAL 2 TIMES DAILY
COMMUNITY
End: 2024-09-06 | Stop reason: HOSPADM

## 2024-08-22 RX ORDER — BISACODYL 10 MG/1
10 SUPPOSITORY RECTAL DAILY
Status: DISCONTINUED | OUTPATIENT
Start: 2024-08-22 | End: 2024-09-07 | Stop reason: HOSPADM

## 2024-08-22 RX ORDER — ACETAMINOPHEN 160 MG/5ML
650 SOLUTION ORAL EVERY 4 HOURS PRN
Status: DISCONTINUED | OUTPATIENT
Start: 2024-08-22 | End: 2024-09-07 | Stop reason: HOSPADM

## 2024-08-22 RX ORDER — ONDANSETRON HYDROCHLORIDE 2 MG/ML
4 INJECTION, SOLUTION INTRAVENOUS EVERY 8 HOURS PRN
Status: DISCONTINUED | OUTPATIENT
Start: 2024-08-22 | End: 2024-09-07 | Stop reason: HOSPADM

## 2024-08-22 RX ORDER — ACETAMINOPHEN 325 MG/1
650 TABLET ORAL EVERY 4 HOURS PRN
Status: DISCONTINUED | OUTPATIENT
Start: 2024-08-22 | End: 2024-09-07 | Stop reason: HOSPADM

## 2024-08-22 RX ORDER — ACETAMINOPHEN 650 MG/1
650 SUPPOSITORY RECTAL EVERY 4 HOURS PRN
Status: DISCONTINUED | OUTPATIENT
Start: 2024-08-22 | End: 2024-09-07 | Stop reason: HOSPADM

## 2024-08-22 RX ORDER — LAMOTRIGINE 100 MG/1
100 TABLET ORAL DAILY
COMMUNITY
End: 2024-09-06 | Stop reason: HOSPADM

## 2024-08-22 RX ORDER — ONDANSETRON 4 MG/1
4 TABLET, ORALLY DISINTEGRATING ORAL EVERY 8 HOURS PRN
Status: DISCONTINUED | OUTPATIENT
Start: 2024-08-22 | End: 2024-09-07 | Stop reason: HOSPADM

## 2024-08-22 RX ORDER — MAGNESIUM HYDROXIDE 2400 MG/10ML
10 SUSPENSION ORAL DAILY PRN
Status: DISCONTINUED | OUTPATIENT
Start: 2024-08-22 | End: 2024-09-07 | Stop reason: HOSPADM

## 2024-08-22 RX ORDER — PANTOPRAZOLE SODIUM 40 MG/10ML
40 INJECTION, POWDER, LYOPHILIZED, FOR SOLUTION INTRAVENOUS EVERY 24 HOURS
Status: DISCONTINUED | OUTPATIENT
Start: 2024-08-22 | End: 2024-08-29

## 2024-08-22 RX ORDER — BUPROPION HYDROCHLORIDE 75 MG/1
75 TABLET ORAL DAILY
COMMUNITY
End: 2024-09-06 | Stop reason: HOSPADM

## 2024-08-22 SDOH — SOCIAL STABILITY: SOCIAL INSECURITY: HAVE YOU HAD THOUGHTS OF HARMING ANYONE ELSE?: UNABLE TO ASSESS

## 2024-08-22 SDOH — SOCIAL STABILITY: SOCIAL INSECURITY: WERE YOU ABLE TO COMPLETE ALL THE BEHAVIORAL HEALTH SCREENINGS?: NO

## 2024-08-22 ASSESSMENT — ACTIVITIES OF DAILY LIVING (ADL)
TOILETING: UNABLE TO ASSESS
WALKS IN HOME: UNABLE TO ASSESS
LACK_OF_TRANSPORTATION: PATIENT UNABLE TO ANSWER
LACK_OF_TRANSPORTATION: PATIENT UNABLE TO ANSWER
BATHING: UNABLE TO ASSESS
ASSISTIVE_DEVICE: OTHER (COMMENT)
FEEDING YOURSELF: UNABLE TO ASSESS
PATIENT'S MEMORY ADEQUATE TO SAFELY COMPLETE DAILY ACTIVITIES?: UNABLE TO ASSESS
DRESSING YOURSELF: UNABLE TO ASSESS
HEARING - LEFT EAR: UNABLE TO ASSESS
GROOMING: UNABLE TO ASSESS
HEARING - RIGHT EAR: UNABLE TO ASSESS
JUDGMENT_ADEQUATE_SAFELY_COMPLETE_DAILY_ACTIVITIES: UNABLE TO ASSESS
ADEQUATE_TO_COMPLETE_ADL: UNABLE TO ASSESS

## 2024-08-22 ASSESSMENT — PAIN - FUNCTIONAL ASSESSMENT
PAIN_FUNCTIONAL_ASSESSMENT: CPOT (CRITICAL CARE PAIN OBSERVATION TOOL)

## 2024-08-22 ASSESSMENT — LIFESTYLE VARIABLES
HOW OFTEN DO YOU HAVE A DRINK CONTAINING ALCOHOL: PATIENT UNABLE TO ANSWER
AUDIT-C TOTAL SCORE: -1
HOW MANY STANDARD DRINKS CONTAINING ALCOHOL DO YOU HAVE ON A TYPICAL DAY: PATIENT UNABLE TO ANSWER
HOW OFTEN DO YOU HAVE 6 OR MORE DRINKS ON ONE OCCASION: PATIENT UNABLE TO ANSWER
SKIP TO QUESTIONS 9-10: 0
AUDIT-C TOTAL SCORE: -1

## 2024-08-22 ASSESSMENT — COGNITIVE AND FUNCTIONAL STATUS - GENERAL: PATIENT BASELINE BEDBOUND: UNABLE TO ASSESS AT THIS TIME

## 2024-08-22 NOTE — CONSULTS
Reason For Consult  Unresponsive state with acute respiratory failure and acute kidney injury    History Of Present Illness  Fidelia Santiago is a 49 y.o. female presenting with unresponsiveness.   She presented to the emergency room intubated and nonresponsive.  I really am not able to get any history personally.  Apparently she was found unresponsive at home and there was a significant other at home there with her however apparently no information was really able to be obtained from that individual either.  She was intubated in the field.  When she presented she had a low pH and a very elevated potassium and elevated creatinine kinase and acute kidney injury  She also was hypothermic  This a.m. when I met her she is appearing comfortable in bed  She is not responding at this time  She does have a gag reflex she also is breathing over the ventilator she has a positive Babinski but otherwise I really am not able to get any response from her.  She does not respond to noxious stimuli at this time  She currently is not making any urine she has been anuric since arriving in the ICU  She was given Narcan there was no response.  Her tox screen was negative  Past Medical History  She has a past medical history of Bipolar 1 disorder (Multi), Fibromyalgia, and PTSD (post-traumatic stress disorder).    Surgical History  She has a past surgical history that includes Cholecystectomy; Tonsillectomy; Tubal ligation; and Breast lumpectomy (Left).     Social History  She reports that she has never smoked. She has never used smokeless tobacco. She reports current alcohol use. She reports that she does not use drugs.    Family History  Family History   Problem Relation Name Age of Onset    No Known Problems Mother      No Known Problems Father          Allergies  Patient has no known allergies.    Review of Systems  A full 10 point review of systems is not able to be obtained at this time she is not able to answer any questions or  "respond     Physical Exam  Physical Exam  Constitutional:       Comments: Asleep, not responding at this time   HENT:      Head: Normocephalic and atraumatic.      Right Ear: External ear normal.      Left Ear: External ear normal.      Nose: Nose normal.      Mouth/Throat:      Mouth: Mucous membranes are moist.      Pharynx: Oropharynx is clear.      Comments: Endotracheal and feeding tube in place  Eyes:      Comments: Eyes closed  Pupils are currently not responding   Cardiovascular:      Rate and Rhythm: Regular rhythm. Tachycardia present.   Pulmonary:      Effort: Pulmonary effort is normal.      Breath sounds: Normal breath sounds.   Abdominal:      General: Abdomen is flat.      Palpations: Abdomen is soft.   Genitourinary:     Comments: Indwelling Garcia catheter in place  Skin:     General: Skin is warm and dry.      Comments: There is some scratches on her skin   Neurological:      Comments: Not responding at this time  Does not respond to noxious stimuli  Positive gag reflex  Breathing over the ventilator  Positive Babinski                 I&O 24HR    Intake/Output Summary (Last 24 hours) at 8/22/2024 1019  Last data filed at 8/22/2024 0955  Gross per 24 hour   Intake 2837 ml   Output 392 ml   Net 2445 ml       Vitals 24HR  Heart Rate:  []   Temp:  [32.7 °C (90.8 °F)-36.6 °C (97.9 °F)]   Resp:  [12-30]   BP: ()/()   Height:  [162.6 cm (5' 4\")]   Weight:  [79.4 kg (175 lb)-84 kg (185 lb 3 oz)]   SpO2:  [95 %-100 %]         Relevant Results  Results reviewed     Assessment/Plan       Acute renal failure with anuria  Rhabdomyolysis  Severe respiratory and metabolic acidosis  Hyperkalemia  Acute respiratory failure  Bilateral infiltrates with possible aspiration  Comatose state currently  Elevated troponin  Hypothermia: Resolved    Plan:  At this time we will treat her aggressively for rhabdomyolysis.  She has received about 2-1/2 L of fluids so far recommendation is to get at least 6 L.  " We will go ahead and give her 3 L over the next 3 hours and we will also start her on a bicarbonate drip to try to alkalize her urine to help with the rhabdo.  We will follow urine output very closely  We will place her on Unasyn covering her for a possible aspiration  Start a PPI for GI prophylaxis  Start heparin for DVT prophylaxis  Dietary will be consulted for tube feeding  We will follow labs very closely  We will also follow mental status very closely  At this time it is unknown how long she was down or really even what caused this issue.  We will continue to evaluate closely and adjust  I spent 45 minutes of critical care time directly involved in patient care excluding any billable procedures    Assessment & Plan  Unresponsiveness          Tomás Randall, DO

## 2024-08-22 NOTE — ED PROVIDER NOTES
HPI   No chief complaint on file.      Limitations to History: Unresponsive    HPI: 49-year-old female presents with concern for unresponsiveness.  Brought in by EMS after being found unresponsive.  4 doses of Narcan without return of consciousness.  Patient was clenched per EMS.  Patient sedated with ketamine and paralyzed with rocuronium.  Intubated on scene.    Additional History Obtained from: EMS.    ------------------------------------------------------------------------------------------------------------------------------------------  Physical Exam:    VS: As documented in the triage note and EMR flowsheet from this visit were reviewed.    Appearance: Unresponsive  Skin: Intact,  dry skin, no lesions, rash, petechiae or purpura.  Abrasion to the left inner thigh.  HENT: Normocephalic, atraumatic. Nares patent.  Eyes: Mid-position. Minimally reactive.  Neck: Supple, without meningismus. Trachea at midline. No lymphadenopathy.  Pulmonary: Clear bilaterally with good chest wall excursion with bagging.    Cardiac: Regular rate and rhythm, no rubs, murmurs, or gallops.   Abdomen: Abdomen is soft, nontender, and nondistended.  No palpable organomegaly.    Musculoskeletal: Pulses full and equal. No cyanosis, clubbing, or edema.  Neurological: GCS 3T.                  Patient History   Past Medical History:   Diagnosis Date    Bipolar 1 disorder (Multi)     Fibromyalgia     PTSD (post-traumatic stress disorder)      Past Surgical History:   Procedure Laterality Date    BREAST LUMPECTOMY Left     2013-atypical cell tumor    CHOLECYSTECTOMY      TONSILLECTOMY      TUBAL LIGATION       Family History   Problem Relation Name Age of Onset    No Known Problems Mother      No Known Problems Father       Social History     Tobacco Use    Smoking status: Never    Smokeless tobacco: Never   Vaping Use    Vaping status: Every Day   Substance Use Topics    Alcohol use: Yes    Drug use: Never       Physical Exam   ED Triage  Vitals   Temp Heart Rate Respirations BP   -- 08/21/24 2007 08/21/24 2007 08/21/24 2011    (!) 108 16 (!) 141/122      Pulse Ox Temp src Heart Rate Source Patient Position   08/21/24 2007 -- -- --   100 %         BP Location FiO2 (%)     -- --             Physical Exam      ED Course & MDM   Diagnoses as of 08/21/24 2314   Unresponsiveness   Hyperkalemia   Elevated liver enzymes   Acute renal failure, unspecified acute renal failure type (CMS-HCC)   Non-traumatic rhabdomyolysis   Lactic acidemia   Aspiration pneumonia of both lower lobes, unspecified aspiration pneumonia type (Multi)   Elevated troponin                 No data recorded                                 Medical Decision Making  Labs Reviewed  CBC WITH AUTO DIFFERENTIAL - Abnormal     WBC                           13.0 (*)               nRBC                          0.0                    RBC                           5.00                   Hemoglobin                    15.0                   Hematocrit                    48.9 (*)               MCV                           98                     MCH                           30.0                   MCHC                          30.7 (*)               RDW                           13.0                   Platelets                     189                    Neutrophils %                 83.0                   Immature Granulocytes %, Automated   0.3                    Lymphocytes %                 9.3                    Monocytes %                   7.2                    Eosinophils %                 0.0                    Basophils %                   0.2                    Neutrophils Absolute          10.76 (*)               Immature Granulocytes Absolute, Au*   0.04                   Lymphocytes Absolute          1.21                   Monocytes Absolute            0.93                   Eosinophils Absolute          0.00                   Basophils Absolute            0.02                 COMPREHENSIVE METABOLIC PANEL - Abnormal     Glucose                       123 (*)                Sodium                        138                    Potassium                     7.4 (*)                Chloride                      104                    Bicarbonate                   25                     Anion Gap                     16                     Urea Nitrogen                 39 (*)                 Creatinine                    2.94 (*)               eGFR                          19 (*)                 Calcium                       7.3 (*)                Albumin                       4.2                    Alkaline Phosphatase          216 (*)                Total Protein                 6.6                    AST                           2,674 (*)               Bilirubin, Total              1.0                    ALT                           1,750 (*)            LACTATE - Abnormal     Lactate                       2.9 (*)                    Narrative: Venipuncture immediately after or during the administration of Metamizole may lead to falsely low results. Testing should be performed immediately                  prior to Metamizole dosing.  TROPONIN I, HIGH SENSITIVITY - Abnormal     Troponin I, High Sensitivity   670 (*)                    Narrative: Less than 99th percentile of normal range cutoff-                  Female and children under 18 years old <14 ng/L; Male <21 ng/L: Negative                  Repeat testing should be performed if clinically indicated.                                     Female and children under 18 years old 14-50 ng/L; Male 21-50 ng/L:                  Consistent with possible cardiac damage and possible increased clinical                   risk. Serial measurements may help to assess extent of myocardial damage.                                     >50 ng/L: Consistent with cardiac damage, increased clinical risk and                  myocardial infarction. Serial  measurements may help assess extent of                   myocardial damage.                                      NOTE: Children less than 1 year old may have higher baseline troponin                   levels and results should be interpreted in conjunction with the overall                   clinical context.                                     NOTE: Troponin I testing is performed using a different                   testing methodology at [facility] than at other                   [facility]. Direct result comparisons should only                   be made within the same method.  BLOOD GAS ARTERIAL - Abnormal     POCT pH, Arterial             7.10 (*)               POCT pCO2, Arterial           63 (*)                 POCT pO2, Arterial            80 (*)                 POCT SO2, Arterial            96                     POCT Oxy Hemoglobin, Arterial   93.4 (*)               POCT Base Excess, Arterial    -10.8 (*)               POCT HCO3 Calculated, Arterial   19.6 (*)               Patient Temperature           37.0                   FiO2                          50                     Ventilator Mode               A/C                    Ventilator Rate               12                     Tidal Volume                  350                    Site of Arterial Puncture                            Amarjit's Test                  Positive             URINALYSIS WITH REFLEX CULTURE AND MICROSCOPIC - Abnormal     Color, Urine                  Yellow                 Appearance, Urine             Turbid (*)               Specific Gravity, Urine       1.018                  pH, Urine                     5.5                    Protein, Urine                100 (2+) (*)               Glucose, Urine                Normal                 Blood, Urine                  1.0 (3+) (*)               Ketones, Urine                NEGATIVE                Bilirubin, Urine              NEGATIVE                 Urobilinogen, Urine           Normal                 Nitrite, Urine                NEGATIVE                Leukocyte Esterase, Urine     NEGATIVE             CREATINE KINASE - Abnormal     Creatine Kinase               1,830 (*)            LACTATE - Abnormal     Lactate                       2.4 (*)                    Narrative: Venipuncture immediately after or during the administration of Metamizole may lead to falsely low results. Testing should be performed immediately                  prior to Metamizole dosing.  TROPONIN I, HIGH SENSITIVITY - Abnormal     Troponin I, High Sensitivity   885 (*)                    Narrative: Less than 99th percentile of normal range cutoff-                  Female and children under 18 years old <14 ng/L; Male <21 ng/L: Negative                  Repeat testing should be performed if clinically indicated.                                     Female and children under 18 years old 14-50 ng/L; Male 21-50 ng/L:                  Consistent with possible cardiac damage and possible increased clinical                   risk. Serial measurements may help to assess extent of myocardial damage.                                     >50 ng/L: Consistent with cardiac damage, increased clinical risk and                  myocardial infarction. Serial measurements may help assess extent of                   myocardial damage.                                      NOTE: Children less than 1 year old may have higher baseline troponin                   levels and results should be interpreted in conjunction with the overall                   clinical context.                                     NOTE: Troponin I testing is performed using a different                   testing methodology at [facility] than at other                   [facility]. Direct result comparisons should only                   be made within the same method.  BLOOD GAS ARTERIAL - Abnormal     POCT pH,  Arterial             7.15 (*)               POCT pCO2, Arterial           58 (*)                 POCT pO2, Arterial            69 (*)                 POCT SO2, Arterial            94                     POCT Oxy Hemoglobin, Arterial   91.8 (*)               POCT Base Excess, Arterial    -9.2 (*)               POCT HCO3 Calculated, Arterial   20.2 (*)               Patient Temperature           37.0                   FiO2                          50                     Ventilator Mode               A/C                    Ventilator Rate               16                     Tidal Volume                  350                    Peep CHM2O                    5.0                    Site of Arterial Puncture                            Amarjit's Test                  Positive             URINALYSIS MICROSCOPIC WITH REFLEX CULTURE - Abnormal     WBC, Urine                    6-10 (*)               WBC Clumps, Urine             RARE                   RBC, Urine                    1-2                    Bacteria, Urine               1+ (*)                 Mucus, Urine                  FEW                    Calcium Oxalate Crystals, Urine   1+                  ALCOHOL - Normal     Alcohol                       <10                 DRUG SCREEN,URINE - Normal     Amphetamine Screen, Urine                            Barbiturate Screen, Urine                            Benzodiazepines Screen, Urine                          Cannabinoid Screen, Urine                            Cocaine Metabolite Screen, Urine                          Fentanyl Screen, Urine                               Opiate Screen, Urine                                 Oxycodone Screen, Urine                              PCP Screen, Urine                                    Methadone Screen, Urine                                  Narrative: Drug screen results are presumptive and should not be used to assess                   compliance with prescribed medication.  Contact the performing [facility] laboratory                   to add-on definitive confirmatory testing if clinically indicated.                                    Toxicology screening results are reported qualitatively. The concentration must                   be greater than or equal to the cutoff to be reported as positive. The concentration                   at which the screening test can detect an individual drug or metabolite varies.                   The absence of expected drug(s) and/or drug metabolite(s) may indicate non-compliance,                   inappropriate timing of specimen collection relative to drug administration, poor drug                   absorption, diluted/adulterated urine, or limitations of testing. For medical purposes                   only; not valid for forensic use.                                    Interpretive questions should be directed to the laboratory medical directors.  SALICYLATE - Normal     Salicylate                    <3                  ACETAMINOPHEN - Normal     Acetaminophen                 <10.0               HUMAN CHORIONIC GONADOTROPIN, SERUM QUANTITATIVE - Normal     HCG, Beta-Quantitative        3                          Narrative:  Total HCG measurement is performed using the [employer] Access                   Immunoassay which detects intact HCG and free beta HCG subunit.                    This test is not indicated for use as a tumor marker.                   HCG testing is performed using a different test methodology at [facility] than other [facility]. Direct result comparison                   should only be made within the same method.                      MAGNESIUM - Normal     Magnesium                     2.26                BLOOD CULTURE  BLOOD CULTURE  URINALYSIS WITH REFLEX CULTURE AND MICROSCOPIC         Narrative: The following orders were created for panel order Urinalysis with Reflex Culture and  Microscopic.                  Procedure                               Abnormality         Status                                     ---------                               -----------         ------                                     Urinalysis with Reflex C...[[id number]]  Abnormal            Final result                               Extra Urine Gray Tube[[id number]]                            In process                                                   Please view results for these tests on the individual orders.  EXTRA URINE GRAY TUBE  POCT GLUCOSE METER  CT chest abdomen pelvis wo IV contrast   Final Result    Bilateral lower lobe infiltrates compatible with pneumonia presumably    aspiration. No pneumothorax.          Bilateral perinephric stranding seen. Correlation with renal function    tests as nephritis can present similarly. Correlation with any    history of rhabdomyolysis. Bilateral non-obstructing nephroliths.          Constipation.          Cholecystectomy changes. Hepatic steatosis. No free air. No free    fluid.          Fluid-filled stomach. Consider placement of nasogastric tube          No evidence of acute intra-abdominal hemorrhage                MACRO:    None          Signed by: Enrique May 8/21/2024 10:59 PM    Dictation workstation:   [id number]     CT head wo IV contrast   Final Result    No acute intracranial abnormality.          MACRO:    None.          Signed by: Araceli Sahu 8/21/2024 8:50 PM    Dictation workstation:   [id number]     CT cervical spine wo IV contrast   Final Result    No evidence for an acute fracture or subluxation of the cervical    spine. Mild cervical spondylosis          MACRO:    None          Signed by: Araceli Sahu 8/21/2024 8:57 PM    Dictation workstation:   [id number]     XR chest 1 view   Final Result    1. Satisfactory appearing endotracheal tube    2. Bilateral perihilar infiltrates with low lung volumes                Signed by: Araceli Sahu  2024 9:00 PM    Dictation workstation:   [id number]     Medical Decision Makin-year-old female presents unresponsive from the community. Found by boyfriend. Boyfriend very aggressive on scene and placed into police custody. Cannot give a clear history of last known well. Intubated by EMS. Treated with 4 doses of Narcan by EMS. Another dose intravenously in the emergency department without improvement in consciousness. Lab work shows hyperkalemia of 7.4, lactic acidemia, elevated liver enzymes, acute renal failure, elevated CK. EKG sinus tachycardia without ischemia. Elevated troponins. No heparin started as I feel this is likely secondary to shock. There were empty methadone bottles which the boyfriend stated were his. Drug screen negative. Alcohol negative. Tylenol and salicylates negative. CT brain and cervical spine negative for acute trauma. CT of the chest abdomen pelvis without contrast shows likely aspiration pneumonia. Patient was treated with broad-spectrum antibiotics. Received 1 L fluid bolus. Currently on 150 mL/h. Poison control was contacted with concern for possible methadone overdose, however with a negative drug screen I feel this is less likely but advised supportive care. Patient currently receiving no sedation without gag reflex. ICU.    Differential Diagnoses Considered: Respiratory arrest, overdose, electrolyte abnormality, pulmonary embolism, infectious process    Independent Interpretation of Studies:  I independently interpreted: CT brain shows no evidence of intracranial hemorrhage.  CT cervical spine without acute fracture or dislocation.  CT chest abdomen pelvis without contrast shows bilateral lower lobe infiltrates.    Escalation of Care:  Appropriate for admission to the intensive care unit.    Discussion of Management with Other Providers:   I discussed the patient/results with: Poison control and admitting hospitalist.    External Records Reviewed: I reviewed recent  and relevant outside records including: Previous primary care as well as emergency department visits detailing psychiatric history with bipolar and PTSD.  Recent medication refill with Wellbutrin.          Procedure  ECG 12 lead    Performed by: Vinod Moran DO  Authorized by: Vinod Moran DO    ECG interpreted by ED Physician in the absence of a cardiologist: yes    Comments:      EKG interpreted by Dr. Vinod Moran: Sinus tachycardia rate of 107 bpm.  MN interval 168 ms.  QTc of 496 ms.  Critical Care    Performed by: Vinod Moran DO  Authorized by: Vinod Moran DO    Critical care provider statement:     Critical care time (minutes):  126    Critical care time was exclusive of:  Separately billable procedures and treating other patients    Critical care was necessary to treat or prevent imminent or life-threatening deterioration of the following conditions:  CNS failure or compromise, respiratory failure and metabolic crisis    Critical care was time spent personally by me on the following activities:  Development of treatment plan with patient or surrogate, discussions with consultants, evaluation of patient's response to treatment, examination of patient, ordering and performing treatments and interventions, ordering and review of laboratory studies, ordering and review of radiographic studies, re-evaluation of patient's condition, review of old charts and ventilator management    Care discussed with: admitting provider         Vinod Moran DO  08/21/24 [id number]

## 2024-08-22 NOTE — CARE PLAN
The clinical goals for the shift include Pt will return to normothermia, decrease in blood pressure, and decrease in potassium level by the end of the shift    Clinical goals met. No observed signs of pain. Pt demonstrates cough and gag reflex, but no corneal reflex. Babinski reflex positive. Does not follow commands. No sedation running with a RASS of -4. Urine output is very diminished, Dr. Lima aware of these findings.

## 2024-08-22 NOTE — H&P
History Of Present Illness  Fidelia Santiago is a 49 y.o. female  Who was brought by the EMS to the emergency room after being found unresponsive.  She was emergently intubated by the EMS in the field.  On presentation to the ER, blood pressure 118/101, respiratory rate 30, temperature 34.3, heart rate 92.   Pertinent findings on blood workup; WBC 13,000, troponin 670, potassium 7.4, creatinine 2.94, calcium 7.3, alkaline phosphatase 216, ALT 1750, AST 2674, creatinine kinase 1830 and lactic acid 2.9 ABG showed a pH of 7.10, pCO2 of 63 and pO2 of 80.  U tox screen came back negative.  Urinalysis came back grossly within normal limits.  CT scan of the chest abdomen and pelvis showed bilateral lower lobe infiltrates compatible with pneumonia presumably aspiration.  There is also bilateral perinephric stranding.  And constipation.  EMS gave the patient on the way 4 doses of Narcan without improvement in consciousness.  Patient was given in the emergency room IV fluids, ceftriaxone, azithromycin, DuoNebs, insulin, sodium bicarbonate.  She was then admitted to the medical service for further investigation and management.    EMS noted that boyfriend was very aggressive on scene and placed into police custody.  No further history was been able to be taken regarding the patient's condition.     ROS  10 systems were reviewed and were negative except for those noted in the history of present illness.    Past Medical History  Past Medical History:   Diagnosis Date    Bipolar 1 disorder (Multi)     Fibromyalgia     PTSD (post-traumatic stress disorder)      Pertinent medical history also documented in my below narrative    Surgical History  Past Surgical History:   Procedure Laterality Date    BREAST LUMPECTOMY Left     2013-atypical cell tumor    CHOLECYSTECTOMY      TONSILLECTOMY      TUBAL LIGATION        Pertinent surgical history also documented in my below narrative    Social History  She reports that she has never  "smoked. She has never used smokeless tobacco. She reports current alcohol use. She reports that she does not use drugs.    Family History  Family History   Problem Relation Name Age of Onset    No Known Problems Mother      No Known Problems Father          Allergies  Patient has no known allergies.    Medications Prior to Admission   Medication Sig Dispense Refill Last Dose    bisacodyl (Dulcolax) 5 mg EC tablet Take 1 tablet (5 mg) by mouth 2 times a day. Do not crush, chew, or split. Take 2 tablets Tuesday night and 2 tablets Wednesday night 4 tablet 0 Unknown    cholecalciferol (Vitamin D-3) 50 MCG (2000 UT) tablet Take 1 tablet (50 mcg) by mouth once daily.   Unknown    dicyclomine (Bentyl) 10 mg capsule Take 1 capsule (10 mg) by mouth 4 times a day as needed (CRAMPS/SPASM). 120 capsule 1 Unknown    multivitamin tablet Take 1 tablet by mouth once daily.   Unknown    omeprazole (PriLOSEC) 40 mg DR capsule Take 1 capsule (40 mg) by mouth once daily in the morning. Take before meals. 90 capsule 3 Unknown    polyethylene glycol (Glycolax, Miralax) 17 gram packet Mix 238 gm of miralax with 64 oz gatorade . 238 packet 0 Unknown    QUEtiapine (SEROquel) 100 mg tablet    Unknown        Last Recorded Vitals  Blood pressure (!) 124/91, pulse 97, temperature 36.1 °C (97 °F), temperature source Temporal, resp. rate 26, height 1.626 m (5' 4\"), weight 84 kg (185 lb 3 oz), SpO2 98%.    Physical Exam  Constitutional:       General: She is not in acute distress.     Comments: Obtunded unresponsive.  Not arousable to pain stimuli.  Art score 3.   HENT:      Mouth/Throat:      Pharynx: Oropharynx is clear.      Comments: Endotracheal and oropharyngeal tubes in place.  Eyes:      Pupils: Pupils are equal, round, and reactive to light.   Cardiovascular:      Rate and Rhythm: Normal rate and regular rhythm.      Heart sounds: Normal heart sounds.   Pulmonary:      Effort: No respiratory distress.      Breath sounds: Normal breath " sounds. No wheezing or rhonchi.   Abdominal:      General: Abdomen is flat. Bowel sounds are normal. There is no distension.      Palpations: Abdomen is soft.      Tenderness: There is no abdominal tenderness.   Musculoskeletal:         General: No swelling.      Comments: Hand and feet are cold   Skin:     General: Skin is dry.   Neurological:      Comments: Reflexes of the extremities are intact. Babinski positive.  Because of patient's compromised mental status, i could not assess the sensory/motor/cerebellar systems as patient cannot follow commands  She does withdraw to pain           Relevant Results  Results for orders placed or performed during the hospital encounter of 08/21/24 (from the past 24 hour(s))   CBC and Auto Differential   Result Value Ref Range    WBC 13.0 (H) 4.4 - 11.3 x10*3/uL    nRBC 0.0 0.0 - 0.0 /100 WBCs    RBC 5.00 4.00 - 5.20 x10*6/uL    Hemoglobin 15.0 12.0 - 16.0 g/dL    Hematocrit 48.9 (H) 36.0 - 46.0 %    MCV 98 80 - 100 fL    MCH 30.0 26.0 - 34.0 pg    MCHC 30.7 (L) 32.0 - 36.0 g/dL    RDW 13.0 11.5 - 14.5 %    Platelets 189 150 - 450 x10*3/uL    Neutrophils % 83.0 40.0 - 80.0 %    Immature Granulocytes %, Automated 0.3 0.0 - 0.9 %    Lymphocytes % 9.3 13.0 - 44.0 %    Monocytes % 7.2 2.0 - 10.0 %    Eosinophils % 0.0 0.0 - 6.0 %    Basophils % 0.2 0.0 - 2.0 %    Neutrophils Absolute 10.76 (H) 1.20 - 7.70 x10*3/uL    Immature Granulocytes Absolute, Automated 0.04 0.00 - 0.70 x10*3/uL    Lymphocytes Absolute 1.21 1.20 - 4.80 x10*3/uL    Monocytes Absolute 0.93 0.10 - 1.00 x10*3/uL    Eosinophils Absolute 0.00 0.00 - 0.70 x10*3/uL    Basophils Absolute 0.02 0.00 - 0.10 x10*3/uL   Comprehensive metabolic panel   Result Value Ref Range    Glucose 123 (H) 74 - 99 mg/dL    Sodium 138 136 - 145 mmol/L    Potassium 7.4 (HH) 3.5 - 5.3 mmol/L    Chloride 104 98 - 107 mmol/L    Bicarbonate 25 21 - 32 mmol/L    Anion Gap 16 mmol/L    Urea Nitrogen 39 (H) 6 - 23 mg/dL    Creatinine 2.94 (H)  0.50 - 1.05 mg/dL    eGFR 19 (L) >60 mL/min/1.73m*2    Calcium 7.3 (L) 8.6 - 10.3 mg/dL    Albumin 4.2 3.4 - 5.0 g/dL    Alkaline Phosphatase 216 (H) 33 - 110 U/L    Total Protein 6.6 6.4 - 8.2 g/dL    AST 2,674 (H) 9 - 39 U/L    Bilirubin, Total 1.0 0.0 - 1.2 mg/dL    ALT 1,750 (H) 7 - 45 U/L   Lactate   Result Value Ref Range    Lactate 2.9 (H) 0.4 - 2.0 mmol/L   Troponin I, High Sensitivity   Result Value Ref Range    Troponin I, High Sensitivity 670 (HH) 0 - 13 ng/L   Alcohol   Result Value Ref Range    Alcohol <10 <=10 mg/dL   Salicylate level   Result Value Ref Range    Salicylate  <3 4 - 20 mg/dL   Acetaminophen level   Result Value Ref Range    Acetaminophen <10.0 10.0 - 30.0 ug/mL   hCG, quantitative, pregnancy   Result Value Ref Range    HCG, Beta-Quantitative 3 <5 mIU/mL   Creatine Kinase   Result Value Ref Range    Creatine Kinase 1,830 (H) 0 - 215 U/L   Magnesium   Result Value Ref Range    Magnesium 2.26 1.60 - 2.40 mg/dL   BLOOD GAS ARTERIAL   Result Value Ref Range    POCT pH, Arterial 7.10 (LL) 7.38 - 7.42 pH    POCT pCO2, Arterial 63 (H) 38 - 42 mm Hg    POCT pO2, Arterial 80 (L) 85 - 95 mm Hg    POCT SO2, Arterial 96 94 - 100 %    POCT Oxy Hemoglobin, Arterial 93.4 (L) 94.0 - 98.0 %    POCT Base Excess, Arterial -10.8 (L) -2.0 - 3.0 mmol/L    POCT HCO3 Calculated, Arterial 19.6 (L) 22.0 - 26.0 mmol/L    Patient Temperature 37.0 degrees Celsius    FiO2 50 %    Ventilator Mode A/C     Ventilator Rate 12 bpm    Tidal Volume 350 mL    Site of Arterial Puncture Radial Right     Amarjit's Test Positive    Urinalysis with Reflex Culture and Microscopic   Result Value Ref Range    Color, Urine Yellow Light-Yellow, Yellow, Dark-Yellow    Appearance, Urine Turbid (N) Clear    Specific Gravity, Urine 1.018 1.005 - 1.035    pH, Urine 5.5 5.0, 5.5, 6.0, 6.5, 7.0, 7.5, 8.0    Protein, Urine 100 (2+) (A) NEGATIVE, 10 (TRACE), 20 (TRACE) mg/dL    Glucose, Urine Normal Normal mg/dL    Blood, Urine 1.0 (3+) (A)  NEGATIVE    Ketones, Urine NEGATIVE NEGATIVE mg/dL    Bilirubin, Urine NEGATIVE NEGATIVE    Urobilinogen, Urine Normal Normal mg/dL    Nitrite, Urine NEGATIVE NEGATIVE    Leukocyte Esterase, Urine NEGATIVE NEGATIVE   Drug Screen, Urine   Result Value Ref Range    Amphetamine Screen, Urine Presumptive Negative Presumptive Negative    Barbiturate Screen, Urine Presumptive Negative Presumptive Negative    Benzodiazepines Screen, Urine Presumptive Negative Presumptive Negative    Cannabinoid Screen, Urine Presumptive Negative Presumptive Negative    Cocaine Metabolite Screen, Urine Presumptive Negative Presumptive Negative    Fentanyl Screen, Urine Presumptive Negative Presumptive Negative    Opiate Screen, Urine Presumptive Negative Presumptive Negative    Oxycodone Screen, Urine Presumptive Negative Presumptive Negative    PCP Screen, Urine Presumptive Negative Presumptive Negative    Methadone Screen, Urine Presumptive Negative Presumptive Negative   Urinalysis Microscopic   Result Value Ref Range    WBC, Urine 6-10 (A) 1-5, NONE /HPF    WBC Clumps, Urine RARE Reference range not established. /HPF    RBC, Urine 1-2 NONE, 1-2, 3-5 /HPF    Bacteria, Urine 1+ (A) NONE SEEN /HPF    Mucus, Urine FEW Reference range not established. /LPF    Calcium Oxalate Crystals, Urine 1+ NONE, 1+ /HPF   Lactate   Result Value Ref Range    Lactate 2.4 (H) 0.4 - 2.0 mmol/L   Troponin I, High Sensitivity   Result Value Ref Range    Troponin I, High Sensitivity 885 (HH) 0 - 13 ng/L   Light Blue Top   Result Value Ref Range    Extra Tube Hold for add-ons.    SST TOP   Result Value Ref Range    Extra Tube Hold for add-ons.    BLOOD GAS ARTERIAL   Result Value Ref Range    POCT pH, Arterial 7.15 (LL) 7.38 - 7.42 pH    POCT pCO2, Arterial 58 (H) 38 - 42 mm Hg    POCT pO2, Arterial 69 (L) 85 - 95 mm Hg    POCT SO2, Arterial 94 94 - 100 %    POCT Oxy Hemoglobin, Arterial 91.8 (L) 94.0 - 98.0 %    POCT Base Excess, Arterial -9.2 (L) -2.0 - 3.0  mmol/L    POCT HCO3 Calculated, Arterial 20.2 (L) 22.0 - 26.0 mmol/L    Patient Temperature 37.0 degrees Celsius    FiO2 50 %    Ventilator Mode A/C     Ventilator Rate 16 bpm    Tidal Volume 350 mL    Peep CHM2O 5.0 cm H2O    Site of Arterial Puncture Radial Right     Amarjit's Test Positive    POCT GLUCOSE   Result Value Ref Range    POCT Glucose 146 (H) 74 - 99 mg/dL        XR chest abdomen for OG NG placement    Result Date: 8/22/2024  Interpreted By:  Domenic Kennedy, STUDY: XR CHEST ABDOMEN FOR OG NG PLACEMENT; ;  8/21/2024 11:37 pm   INDICATION: Signs/Symptoms:OG placement.   COMPARISON: None.   ACCESSION NUMBER(S): [id number]   ORDERING CLINICIAN: BARBIE PRINCE   FINDINGS: Feeding tube tip terminates in the left upper abdomen at level of the stomach.   Bilateral opacities in lower lungs compatible with pneumonia is better assessed on the CT chest examination.   There is limited evaluation of abdomen with external density obscuring the lower abdomen.       Feeding tube tip terminates in the left upper abdomen at level of the stomach.     MACRO: None   Signed by: Domenic Kennedy 8/22/2024 12:31 AM Dictation workstation:   [id number]    CT chest abdomen pelvis wo IV contrast    Result Date: 8/21/2024  Interpreted By:  Enrique May, STUDY: CT CHEST ABDOMEN PELVIS WO CONTRAST;  8/21/2024 10:10 pm   INDICATION: Signs/Symptoms:unresponsive   COMPARISON: 11/2020   ACCESSION NUMBER(S): [id number]   ORDERING CLINICIAN: BARBIE PRINCE   TECHNIQUE: CT of the chest, abdomen, and pelvis was performed. Contiguous axial images were obtained at  5 mm slice thickness through the chest, and at  3 mm through the abdomen and pelvis. Coronal and sagittal reconstructions at  3 mm slice thickness were performed.   FINDINGS: CHEST:   Endotracheal tube terminates proximally 3.1 cm above the fredo.   Patchy bibasilar opacities most likely sequela of aspiration type pneumonia. Fluid seen in the thoracic esophagus no axillary  adenopathy. No pneumothorax. No pericardial effusion.   Left humerus interosseous access device.   ABDOMEN:   New bilateral perinephric stranding is seen. Correlation with renal function tests is advised as nephritis is suspected. No hydronephrosis. 4 mm left lower pole stone. 2 mm right midpole stone. Spleen at the upper limits of normal in size.   Features of constipation. No free air. No significant free fluid. No pneumatosis. Unremarkable adrenal glands and pancreas. No aneurysm Hepatic steatosis. No biliary duct dilatation. No features of pancreatitis.   Fluid-filled stomach.   Adnexal structures otherwise unremarkable. Degenerative disc disease most notable in the lower lumbar spine       Bilateral lower lobe infiltrates compatible with pneumonia presumably aspiration. No pneumothorax.   Bilateral perinephric stranding seen. Correlation with renal function tests as nephritis can present similarly. Correlation with any history of rhabdomyolysis. Bilateral non-obstructing nephroliths.   Constipation.   Cholecystectomy changes. Hepatic steatosis. No free air. No free fluid.   Fluid-filled stomach. Consider placement of nasogastric tube   No evidence of acute intra-abdominal hemorrhage     MACRO: None   Signed by: Enrique May 8/21/2024 10:59 PM Dictation workstation:   [id number]    XR chest 1 view    Result Date: 8/21/2024  Interpreted By:  Araceli Sahu, STUDY: XR CHEST 1 VIEW 8/21/2024 8:22 pm   INDICATION: Signs/Symptoms:tube placement   COMPARISON: 05/26/2011   ACCESSION NUMBER(S): [id number]   ORDERING CLINICIAN: BARBIE PRINCE   TECHNIQUE: AP view   FINDINGS: Endotracheal tube noted with the tip 3.4 cm above the fredo. Low lung volumes. Perihilar airspace opacification seen bilaterally. Pulmonary vascularity unremarkable. Heart in the upper limits of normal for size. No pneumothorax or significant pleural effusion.       1. Satisfactory appearing endotracheal tube 2. Bilateral perihilar  infiltrates with low lung volumes     Signed by: Araceli Sahu 8/21/2024 9:00 PM Dictation workstation:   [id number]    CT cervical spine wo IV contrast    Result Date: 8/21/2024  Interpreted By:  Araceli Sahu, STUDY: CT CERVICAL SPINE WO IV CONTRAST;  8/21/2024 8:30 pm   INDICATION: Signs/Symptoms:unresponsive.   COMPARISON: None.   ACCESSION NUMBER(S): [id number]   ORDERING CLINICIAN: BARBIE PRINCE   TECHNIQUE: Axial CT images of the cervical spine are obtained. Axial, coronal and sagittal reconstructions are provided for review.   All CT exams are performed with 1 or more of the following dose reduction techniques: Automatic exposure control, adjustment of mA and/or kv according to patient size, or use of iterative reconstruction techniques.   FINDINGS: There is no acute fracture or traumatic subluxation. No prevertebral soft tissue swelling.   There is moderate disc space narrowing at C5-6 and more severe disc space narrowing at C6-7 with osteophytic ridging at these levels. Uncovertebral joint hypertrophy noted at C5-6 causes slight bony encroachment on the foramina.   Small amount of fluid layering in the sphenoid sinuses. Endotracheal tube noted. Small amount of fluid in the esophagus. Coarsened markings in the lung apices.       No evidence for an acute fracture or subluxation of the cervical spine. Mild cervical spondylosis   MACRO: None   Signed by: Araceli Sahu 8/21/2024 8:57 PM Dictation workstation:   [id number]    CT head wo IV contrast    Result Date: 8/21/2024  Interpreted By:  Araceli Sahu, STUDY: CT HEAD WO IV CONTRAST;  8/21/2024 8:30 pm   INDICATION: Signs/Symptoms:unresponsive.   COMPARISON: None..   ACCESSION NUMBER(S): [id number]   ORDERING CLINICIAN: BARBIE PRINCE   TECHNIQUE: CT axial images through the Brain were obtained without contrast.   All CT exams are performed with 1 or more of the following dose reduction techniques: Automatic exposure control, adjustment of mA and/or  kv according to patient size, or use of iterative reconstruction techniques.   FINDINGS: There is no mass effect, hemorrhage, or infarct. The ventricles appear normal. Gray-white differentiation is maintained.   Air-fluid level in the sphenoid sinuses. Slight paranasal sinus mucosal thickening.. The visualized portions of the orbits are unremarkable. Endotracheal tube noted.       No acute intracranial abnormality.   MACRO: None.   Signed by: Araceli Sahu 8/21/2024 8:50 PM Dictation workstation:   [id number]       Assessment/Plan     49-year-old obese female with a past medical history of bipolar disorder, dermatofibroma of the left arm, fibromyalgia, chronic pain, nonalcoholic fatty liver disease, and vitamin D deficiency who was brought by the EMS to the emergency room after being found unresponsive by her boyfriend. EMS noted that boyfriend was very aggressive on scene and placed into police custody.  In the ER, patient was found to have a sepsis [leukocytosis, hypothermia, lactic acidosis] associated with acute kidney injury, hypercapnic respiratory failure with respiratory acidosis, hypocalcemia, transaminitis, rhabdomyolysis, and elevated troponin.  All in the setting of an underlying possible aspiration pneumonia of gram-negative bacteria origin.    I will admit the patient to the intensive care unit with telemetry and vital signs monitoring.  Consult critical care.  Continue with IV fluids normal saline at rate of 150 cc/hour.  Repeat CBC and CMP.  As well as monitor CK levels.  I still do not have yet a clear explanation of the patient's episode of unresponsiveness and whether this episode led to aspiration or the other way.  Elevated troponin is most likely secondary to the sepsis.  I will consult cardiology and order echocardiogram.  No EKG changes.  I will give Kayexalate 30 g 1 dose for the hyperkalemia.  Check levels again in the afternoon.  Continue with the azithromycin 500 mg IV daily and replace  the ceftriaxone by Zosyn renally dose.  Follow-up with the blood culture final results.  Should patient develop any fever, then would consider broadening the antibiotic coverage.  The transaminitis is most likely secondary to a liver shock.  Will monitor levels for now.  Regarding the home medications, it is noted that patient is on Seroquel but not confirmed.  This will need to be reviewed later with pharmacy and then we will decide on whether to resume it or not.  But for now, I will be holding especially that patient is unresponsive.  SCDs for DVT prophylaxis  Protonix for GI prophylaxis    I have reviewed and evaluated the most recent data and results, personally examined the patient, and formulated the plan of care as presented above.  Patient is at-risk for clinically significant deterioration / failure due to the above mentioned dysfunctional, unstable organ systems and requires continued critical care treatment. I have personally identified and managed all complex critical care issues to prevent aforementioned clinical deterioration.      60 minutes were spent in the critical care management of the patient excluding billable procedures       (This note was generated with voice recognition software and may contain errors including spelling, grammar, syntax and misrecognition of what was dictated, that are not fully corrected)     Clarence Vela MD

## 2024-08-22 NOTE — CONSULTS
Inpatient consult to Cardiology  Consult performed by: Jason Berman MD  Consult ordered by: Clarence Vela MD  Reason for consult: troponin leak      History Of Present Illness:    Mrs. Fidelia Santiago is a 49 y.o. non-smoker female being consulted by the Cardiology team for troponin leak. Patient with prior medical history significant for bipolar disorder, dermatofibroma of the left arm, fibromyalgia, chronic pain, nonalcoholic fatty liver disease, and vitamin D deficiency. Patient intubated and cannot give reliable information at this point. History has been obtained from previous records. Per chart review,   she was brought by the EMS to the emergency room after being found unresponsive by her boyfriend. EMS noted that boyfriend was very aggressive on scene and placed into police custody. She was found unresponsive and emergently intubated by the EMS in the field. On presentation to the ER, blood pressure 118/101, respiratory rate 30, temperature 34.3, heart rate 92. Pertinent findings on blood workup; WBC 13,000, troponin 670 --> 885, potassium 7.4, creatinine 2.94, calcium 7.3, alkaline phosphatase 216, ALT 1750, AST 2674, creatinine kinase 1830 and lactic acid 2.9 ABG showed a pH of 7.10, pCO2 of 63 and pO2 of 80. U tox screen came back negative. Urinalysis came back grossly within normal limits. CT scan of the chest abdomen and pelvis showed bilateral lower lobe infiltrates compatible with pneumonia presumably aspiration. There is also bilateral perinephric stranding. And constipation. EMS gave the patient on the way 4 doses of Narcan without improvement in consciousness. Patient was given in the emergency room IV fluids, ceftriaxone, azithromycin, DuoNebs, insulin, sodium bicarbonate. Patient was found to have a sepsis [leukocytosis, hypothermia, lactic acidosis] associated with acute kidney injury, hypercapnic respiratory failure with respiratory acidosis, hypocalcemia, transaminitis,  "rhabdomyolysis, and elevated troponin. All in the setting of an underlying possible aspiration pneumonia of gram-negative bacteria origin. She has been admitted for clinical compensation.     Last Recorded Vitals:  Vitals:    08/22/24 0630 08/22/24 0644 08/22/24 0700 08/22/24 0800   BP: 113/72  112/75    BP Location: Right arm  Right arm    Patient Position: Lying  Lying    Pulse: 103  106    Resp: 18 19 22 24   Temp:   36.6 °C (97.9 °F)    TempSrc:   Temporal    SpO2:  98% 97% 96%   Weight:       Height:           Last Labs:  CBC - 8/22/2024:  6:12 AM  14.7 15.3 167    46.9      CMP - 8/22/2024:  6:12 AM  7.8 5.8 1,494 --- 0.8   _ 3.5 1,376 191      PTT - No results in last year.  _   _ _     Troponin I, High Sensitivity   Date/Time Value Ref Range Status   08/21/2024 09:44  (HH) 0 - 13 ng/L Final     Comment:     Previous result verified on 8/21/2024 2106 on specimen/case [id number] called with component [facility] for procedure Troponin I, High Sensitivity with value 670 ng/L.   08/21/2024 08:16  (HH) 0 - 13 ng/L Final     Hemoglobin A1C   Date/Time Value Ref Range Status   12/15/2020 09:58 AM 5.1 4.0 - 5.6 % Final      Last I/O:  I/O last 3 completed shifts:  In: 2274.5 (27.1 mL/kg) [I.V.:812.5 (9.7 mL/kg); NG/GT:163; IV Piggyback:1299]  Out: 392 (4.7 mL/kg) [Urine:392 (0.1 mL/kg/hr)]  Weight: 84 kg     Past Cardiology Tests (Last 3 Years):  EKG:  ECG 12 lead 08/21/2024 (Preliminary)    Echo:  No results found for this or any previous visit from the past 1095 days.    Ejection Fractions:  No results found for: \"EF\"  Cath:  No results found for this or any previous visit from the past 1095 days.    Stress Test:  No results found for this or any previous visit from the past 1095 days.    Cardiac Imaging:  XR chest abdomen for OG NG placement 08/21/2024      Past Medical History:  She has a past medical history of Bipolar 1 disorder (Multi), Fibromyalgia, and PTSD (post-traumatic stress " disorder).    Past Surgical History:  She has a past surgical history that includes Cholecystectomy; Tonsillectomy; Tubal ligation; and Breast lumpectomy (Left).      Social History:  She reports that she has never smoked. She has never used smokeless tobacco. She reports current alcohol use. She reports that she does not use drugs.    Family History:  Family History   Problem Relation Name Age of Onset    No Known Problems Mother      No Known Problems Father          Allergies:  Patient has no known allergies.    Inpatient Medications:  Scheduled medications   Medication Dose Route Frequency    azithromycin  500 mg intravenous q24h    bisacodyl  10 mg rectal Daily    oxygen   inhalation Continuous - Inhalation    perflutren protein A microsphere  0.5 mL intravenous Once in imaging    piperacillin-tazobactam  2.25 g intravenous q6h    sulfur hexafluoride microsphr  2 mL intravenous Once in imaging     PRN medications   Medication    acetaminophen    Or    acetaminophen    Or    acetaminophen    magnesium hydroxide    ondansetron ODT    Or    ondansetron     Continuous Medications   Medication Dose Last Rate    propofol  5-20 mcg/kg/min Stopped (08/21/24 1547)    sodium chloride 0.9%  150 mL/hr 150 mL/hr (08/22/24 2473)     Outpatient Medications:  Current Outpatient Medications   Medication Instructions    bisacodyl (DULCOLAX) 5 mg, oral, 2 times daily, Do not crush, chew, or split. Take 2 tablets Tuesday night and 2 tablets Wednesday night     cholecalciferol (VITAMIN D-3) 50 mcg, oral, Daily    dicyclomine (BENTYL) 10 mg, oral, 4 times daily PRN    multivitamin tablet 1 tablet, oral, Daily    omeprazole (PRILOSEC) 40 mg, oral, Daily before breakfast    polyethylene glycol (Glycolax, Miralax) 17 gram packet Mix 238 gm of miralax with 64 oz gatorade .    QUEtiapine (SEROquel) 100 mg tablet        Physical Exam:  General: Unconscious, intubated  Neck: supple; trachea midline; no masses; no JVD; intubated  Chest:  clear breath sounds bilaterally; no wheezing; on Mechanical ventilation  Cardio: regular rhythm, S1S2 normal, no murmurs  Abdomen: Soft, non-tender, non-distension, no organomegaly  Extremities: no clubbing/cyanosis/edema     Assessment/Plan     Mrs. Fidelia Santiago is a 49 y.o. non-smoker female being consulted by the Cardiology team for troponin leak. Patient with prior medical history significant for bipolar disorder, dermatofibroma of the left arm, fibromyalgia, chronic pain, nonalcoholic fatty liver disease, and vitamin D deficiency. Patient intubated and cannot give reliable information at this point. History has been obtained from previous records. Per chart review,   she was brought by the EMS to the emergency room after being found unresponsive by her boyfriend. EMS noted that boyfriend was very aggressive on scene and placed into police custody. She was found unresponsive and emergently intubated by the EMS in the field. On presentation to the ER, blood pressure 118/101, respiratory rate 30, temperature 34.3, heart rate 92. Pertinent findings on blood workup; WBC 13,000, troponin 670 --> 885, potassium 7.4, creatinine 2.94, calcium 7.3, alkaline phosphatase 216, ALT 1750, AST 2674, creatinine kinase 1830 and lactic acid 2.9 ABG showed a pH of 7.10, pCO2 of 63 and pO2 of 80. U tox screen came back negative. Urinalysis came back grossly within normal limits. CT scan of the chest abdomen and pelvis showed bilateral lower lobe infiltrates compatible with pneumonia presumably aspiration. There is also bilateral perinephric stranding. And constipation. EMS gave the patient on the way 4 doses of Narcan without improvement in consciousness. Patient was given in the emergency room IV fluids, ceftriaxone, azithromycin, DuoNebs, insulin, sodium bicarbonate. Patient was found to have a sepsis [leukocytosis, hypothermia, lactic acidosis] associated with acute kidney injury, hypercapnic respiratory failure with  respiratory acidosis, hypocalcemia, transaminitis, rhabdomyolysis, and elevated troponin. All in the setting of an underlying possible aspiration pneumonia of gram-negative bacteria origin. She has been admitted for clinical compensation.    Assessment    # Troponin Elevation  - Trop 670 --> 885  - Troponin elevation is likely attributable to non-ischemic myocardial injury due to myocardial imbalance in oxygen supply/demand secondary to underlying rhabdomyolysis.   - Would suggest to continue current medical management per primary team.    - Hydration.  - Echo pending.  - Once patient is more stable, we can discuss options to rule out cardiac ischemia.    # Rhabdomyolysis / HERI  - Crea 3.4  - Ph 7.15  - ALT 1376  - AST 1494  - Lactate 2.4  - Would suggest to capitalize on hydration.  - Nephrology recs.    # Aspirative Pneumonia  - Would suggest to keep broad spectrum antibiotics.  - Would suggest to titrate hydration according to clinical status.      This critically ill patient continues to be at-risk for clinically significant deterioration / failure due to the above mentioned dysfunctional, unstable organ systems.  I have personally identified and managed all complex critical care issues to prevent aforementioned clinical deterioration.  Critical care time is spent at bedside and/or the immediate area and has included, but is not limited to, the review of diagnostic tests, labs, radiographs, serial assessments of hemodynamics, respiratory status, ventilatory management, and family updates.  Time spent in procedures and teaching are reported separately.    Critical care time: 65 minutes     Peripheral IV 08/21/24 20 G Left Antecubital (Active)   Site Assessment Clean;Dry;Intact 08/22/24 0400   Dressing Type Transparent 08/22/24 0400   Line Status Saline locked 08/22/24 0400   Dressing Status Clean;Dry 08/22/24 0400   Number of days: 1       Peripheral IV 08/21/24 16 G Right Antecubital (Active)   Line Status Lab  draw;Flushed 08/22/24 0600   Number of days: 1       ETT  6.5 mm (Active)   Secured at (cm) 23 cm 08/22/24 0644   Measured from Lips 08/22/24 0644   Secured Location Center 08/22/24 0644   Secured by Commercial tube felipe 08/22/24 0644   Site Condition Dry 08/22/24 0644   Number of days: 1       Intraosseous Line Other (Comment) (Active)   Line Status Other (Comment) 08/22/24 0400   Dressing Type Transparent 08/22/24 0400   Dressing Status Clean;Dry;Old drainage 08/22/24 0400   Dressing Change Due 08/22/24 08/22/24 0400   Number of days: 1       NG/OG/Feeding Tube OG - [place] sump 14 Fr Center mouth (Active)   NG/OG Interventions Irrigated 08/22/24 0440   Irrigant Tap water 08/22/24 0440   Response To Intervention No resistance met 08/22/24 0440   Intake (mL) 163 mL 08/22/24 0440   Number of days: 1       Urethral Catheter Straight-tip 16 Fr. (Active)   Output (mL) 11 mL 08/22/24 0630   Number of days: 1     Code Status:  Full Code    Jason Berman MD  Cardiology

## 2024-08-22 NOTE — PROGRESS NOTES
08/22/24 1603   Discharge Planning   Living Arrangements Other (Comment)  (Unknown, unable to assess)   Support Systems /  ([facility] )   Assistance Needed TBD   Type of Residence Other (Comment)  (unable to assess)   Do you have animals or pets at home?   (unknown, unable to assess)   Expected Discharge Disposition Other  (TBD)   Does the patient need discharge transport arranged? Yes   RoundTrip coordination needed? Yes   Financial Resource Strain   How hard is it for you to pay for the very basics like food, housing, medical care, and heating? Pt Unable   Housing Stability   In the last 12 months, was there a time when you were not able to pay the mortgage or rent on time? Pt Unable   At any time in the past 12 months, were you homeless or living in a shelter (including now)? Pt Unable   Transportation Needs   In the past 12 months, has lack of transportation kept you from medical appointments or from getting medications? Pt Unable   In the past 12 months, has lack of transportation kept you from meetings, work, or from getting things needed for daily living? Pt Unable     Care Transitions: Patient reviewed during care round meeting this AM and is not medically ready for discharge. Patient is unresponsive and currently on vent. Unable to perform discharge assessment and needs. Emergency contact listed is Shyann Arroyo. Spoke to Shyann @ [phone]. States she is patients  at [facility]. Shyann states patient does not have any emergency contact listed with [facility]. She has an adult daughter but there is also substance abuse involved. Daughter whereabouts are unknown. Shyann is attempting to try to find a family contact and will update care transitions team with any information. Care team SW updated to case also. Fidelia Baez RN/TCC    [phone] Spoke to patients PCP Martha Ramirez office staff to review emergency/next of kin contact information. Emergency contact  listed with PCP is also Shyann Arroyo with [facility]. No family contacts listed. Fidelia Baez RN/TCC

## 2024-08-22 NOTE — SIGNIFICANT EVENT
Patient was seen admitted overnight.  Reviewed chart and agree with assessment plan.  Monitor clinically  Continue current management  Acute hypoxemic respiratory failure on vent  Severe respiratory and metabolic acidosis  rhabdomyolysis, hydrate aggressively  Elevated troponin possibly from nonischemic myocardial injury, follow cardiology  Aspiration pneumonia on Unasyn  Hypothermia, resolved  HERI  Hyperkalemia, to fix electrolyte disturbances  Bicarb drip to alkalinize urine  U tox positive for methadone  Supportive care  Follow mentation  Neurochecks watch hemodynamics  Continue current treatment plan and follow critical care

## 2024-08-23 LAB
ALBUMIN SERPL BCP-MCNC: 2.9 G/DL (ref 3.4–5)
ALP SERPL-CCNC: 133 U/L (ref 33–110)
ALT SERPL W P-5'-P-CCNC: 767 U/L (ref 7–45)
ANION GAP SERPL CALC-SCNC: 16 MMOL/L (ref 10–20)
AST SERPL W P-5'-P-CCNC: 394 U/L (ref 9–39)
ATRIAL RATE: 107 BPM
BASE EXCESS BLDA CALC-SCNC: 11.4 MMOL/L (ref -2–3)
BASOPHILS # BLD AUTO: 0.01 X10*3/UL (ref 0–0.1)
BASOPHILS NFR BLD AUTO: 0.1 %
BILIRUB SERPL-MCNC: 0.4 MG/DL (ref 0–1.2)
BODY TEMPERATURE: 37 DEGREES CELSIUS
BUN SERPL-MCNC: 53 MG/DL (ref 6–23)
CALCIUM SERPL-MCNC: 7.1 MG/DL (ref 8.6–10.3)
CARDIAC TROPONIN I PNL SERPL HS: 1624 NG/L (ref 0–13)
CHLORIDE SERPL-SCNC: 99 MMOL/L (ref 98–107)
CK SERPL-CCNC: 1180 U/L (ref 0–215)
CO2 SERPL-SCNC: 30 MMOL/L (ref 21–32)
CREAT SERPL-MCNC: 5.01 MG/DL (ref 0.5–1.05)
EGFRCR SERPLBLD CKD-EPI 2021: 10 ML/MIN/1.73M*2
EOSINOPHIL # BLD AUTO: 0 X10*3/UL (ref 0–0.7)
EOSINOPHIL NFR BLD AUTO: 0 %
ERYTHROCYTE [DISTWIDTH] IN BLOOD BY AUTOMATED COUNT: 13.3 % (ref 11.5–14.5)
GLUCOSE SERPL-MCNC: 132 MG/DL (ref 74–99)
HCO3 BLDA-SCNC: 35.9 MMOL/L (ref 22–26)
HCT VFR BLD AUTO: 40.2 % (ref 36–46)
HGB BLD-MCNC: 13.3 G/DL (ref 12–16)
HOLD SPECIMEN: NORMAL
IMM GRANULOCYTES # BLD AUTO: 0.04 X10*3/UL (ref 0–0.7)
IMM GRANULOCYTES NFR BLD AUTO: 0.3 % (ref 0–0.9)
INHALED O2 CONCENTRATION: 28 %
LYMPHOCYTES # BLD AUTO: 0.8 X10*3/UL (ref 1.2–4.8)
LYMPHOCYTES NFR BLD AUTO: 6.7 %
MAGNESIUM SERPL-MCNC: 1.62 MG/DL (ref 1.6–2.4)
MCH RBC QN AUTO: 29.8 PG (ref 26–34)
MCHC RBC AUTO-ENTMCNC: 33.1 G/DL (ref 32–36)
MCV RBC AUTO: 90 FL (ref 80–100)
MONOCYTES # BLD AUTO: 0.94 X10*3/UL (ref 0.1–1)
MONOCYTES NFR BLD AUTO: 7.8 %
NEUTROPHILS # BLD AUTO: 10.23 X10*3/UL (ref 1.2–7.7)
NEUTROPHILS NFR BLD AUTO: 85.1 %
NRBC BLD-RTO: 0 /100 WBCS (ref 0–0)
OXYHGB MFR BLDA: 97.5 % (ref 94–98)
P AXIS: 90 DEGREES
P OFFSET: 188 MS
P ONSET: 139 MS
PCO2 BLDA: 45 MM HG (ref 38–42)
PH BLDA: 7.51 PH (ref 7.38–7.42)
PHOSPHATE SERPL-MCNC: 4.9 MG/DL (ref 2.5–4.9)
PLATELET # BLD AUTO: 148 X10*3/UL (ref 150–450)
PO2 BLDA: 91 MM HG (ref 85–95)
POTASSIUM SERPL-SCNC: 3 MMOL/L (ref 3.5–5.3)
PR INTERVAL: 168 MS
PROT SERPL-MCNC: 4.9 G/DL (ref 6.4–8.2)
Q ONSET: 223 MS
QRS COUNT: 17 BEATS
QRS DURATION: 80 MS
QT INTERVAL: 372 MS
QTC CALCULATION(BAZETT): 496 MS
QTC FREDERICIA: 451 MS
R AXIS: 37 DEGREES
RBC # BLD AUTO: 4.46 X10*6/UL (ref 4–5.2)
SAO2 % BLDA: 99 % (ref 94–100)
SODIUM SERPL-SCNC: 142 MMOL/L (ref 136–145)
T AXIS: 73 DEGREES
T OFFSET: 409 MS
VENTRICULAR RATE: 107 BPM
WBC # BLD AUTO: 12 X10*3/UL (ref 4.4–11.3)

## 2024-08-23 PROCEDURE — 36600 WITHDRAWAL OF ARTERIAL BLOOD: CPT

## 2024-08-23 PROCEDURE — 2500000004 HC RX 250 GENERAL PHARMACY W/ HCPCS (ALT 636 FOR OP/ED): Performed by: INTERNAL MEDICINE

## 2024-08-23 PROCEDURE — 82805 BLOOD GASES W/O2 SATURATION: CPT | Performed by: INTERNAL MEDICINE

## 2024-08-23 PROCEDURE — 85025 COMPLETE CBC W/AUTO DIFF WBC: CPT | Performed by: INTERNAL MEDICINE

## 2024-08-23 PROCEDURE — 84484 ASSAY OF TROPONIN QUANT: CPT | Performed by: INTERNAL MEDICINE

## 2024-08-23 PROCEDURE — 31720 CLEARANCE OF AIRWAYS: CPT

## 2024-08-23 PROCEDURE — 36415 COLL VENOUS BLD VENIPUNCTURE: CPT | Performed by: INTERNAL MEDICINE

## 2024-08-23 PROCEDURE — 2500000001 HC RX 250 WO HCPCS SELF ADMINISTERED DRUGS (ALT 637 FOR MEDICARE OP): Performed by: INTERNAL MEDICINE

## 2024-08-23 PROCEDURE — 94681 O2 UPTK CO2 OUTP % O2 XTRC: CPT

## 2024-08-23 PROCEDURE — 99239 HOSP IP/OBS DSCHRG MGMT >30: CPT | Performed by: STUDENT IN AN ORGANIZED HEALTH CARE EDUCATION/TRAINING PROGRAM

## 2024-08-23 PROCEDURE — 99291 CRITICAL CARE FIRST HOUR: CPT | Performed by: INTERNAL MEDICINE

## 2024-08-23 PROCEDURE — 84100 ASSAY OF PHOSPHORUS: CPT | Performed by: INTERNAL MEDICINE

## 2024-08-23 PROCEDURE — 94003 VENT MGMT INPAT SUBQ DAY: CPT

## 2024-08-23 PROCEDURE — 82550 ASSAY OF CK (CPK): CPT | Performed by: INTERNAL MEDICINE

## 2024-08-23 PROCEDURE — 99291 CRITICAL CARE FIRST HOUR: CPT | Performed by: STUDENT IN AN ORGANIZED HEALTH CARE EDUCATION/TRAINING PROGRAM

## 2024-08-23 PROCEDURE — 83735 ASSAY OF MAGNESIUM: CPT | Performed by: INTERNAL MEDICINE

## 2024-08-23 PROCEDURE — 80053 COMPREHEN METABOLIC PANEL: CPT | Performed by: INTERNAL MEDICINE

## 2024-08-23 PROCEDURE — 2020000001 HC ICU ROOM DAILY

## 2024-08-23 RX ORDER — POTASSIUM CHLORIDE 1.5 G/1.58G
40 POWDER, FOR SOLUTION ORAL 2 TIMES DAILY
Status: COMPLETED | OUTPATIENT
Start: 2024-08-23 | End: 2024-08-23

## 2024-08-23 RX ORDER — SODIUM CHLORIDE, SODIUM LACTATE, POTASSIUM CHLORIDE, CALCIUM CHLORIDE 600; 310; 30; 20 MG/100ML; MG/100ML; MG/100ML; MG/100ML
150 INJECTION, SOLUTION INTRAVENOUS CONTINUOUS
Status: DISCONTINUED | OUTPATIENT
Start: 2024-08-23 | End: 2024-08-24

## 2024-08-23 RX ORDER — HEPARIN SODIUM 5000 [USP'U]/ML
5000 INJECTION, SOLUTION INTRAVENOUS; SUBCUTANEOUS EVERY 8 HOURS SCHEDULED
Status: DISCONTINUED | OUTPATIENT
Start: 2024-08-23 | End: 2024-09-06

## 2024-08-23 ASSESSMENT — PAIN - FUNCTIONAL ASSESSMENT

## 2024-08-23 NOTE — PROGRESS NOTES
Subjective Data:  Patient remains unresponsive at this point.    Overnight Events:    No     Objective Data:  Last Recorded Vitals:  Vitals:    08/23/24 0708 08/23/24 0923 08/23/24 1111 08/23/24 1132   BP:   130/90    BP Location:   Right arm    Patient Position:   Lying    Pulse:       Resp: 15 14     Temp:   35.6 °C (96.1 °F)    TempSrc:   Temporal    SpO2: 98% 97% 98% 99%   Weight:       Height:           Last Labs:  CBC - 8/23/2024:  3:57 AM  12.0 13.3 148    40.2      CMP - 8/23/2024:  3:57 AM  7.1 4.9 394 --- 0.4   4.9 2.9 767 133      PTT - No results in last year.  _   _ _     TROPHS   Date/Time Value Ref Range Status   08/23/2024 03:57 AM 1,624 0 - 13 ng/L Final   08/21/2024 09:44  0 - 13 ng/L Final     Comment:     Previous result verified on 8/21/2024 2106 on specimen/case [id number] called with component [facility] for procedure Troponin I, High Sensitivity with value 670 ng/L.   08/21/2024 08:16  0 - 13 ng/L Final     HGBA1C   Date/Time Value Ref Range Status   12/15/2020 09:58 AM 5.1 4.0 - 5.6 % Final      Last I/O:  I/O last 3 completed shifts:  In: 5310.3 (63.2 mL/kg) [I.V.:3698.3 (44 mL/kg); NG/GT:163; IV Piggyback:1449]  Out: 867 (10.3 mL/kg) [Urine:867 (0.3 mL/kg/hr)]  Weight: 84 kg     Past Cardiology Tests (Last 3 Years):  EKG:  ECG 12 lead 08/21/2024 (Preliminary)    Echo:  Transthoracic Echo (TTE) Limited 08/22/2024    Ejection Fractions:  EF   Date/Time Value Ref Range Status   08/22/2024 06:00 AM 40 %      Cath:  No results found for this or any previous visit from the past 1095 days.    Stress Test:  No results found for this or any previous visit from the past 1095 days.    Cardiac Imaging:  XR chest abdomen for OG NG placement 08/21/2024      Inpatient Medications:  Scheduled medications   Medication Dose Route Frequency    ampicillin-sulbactam  3 g intravenous q12h    bisacodyl  10 mg rectal Daily    heparin (porcine)  5,000 Units subcutaneous q8h CAMI    oxygen   inhalation  Continuous - Inhalation    pantoprazole  40 mg intravenous q24h    perflutren protein A microsphere  0.5 mL intravenous Once in imaging    potassium chloride  40 mEq nasogastric tube BID    sulfur hexafluoride microsphr  2 mL intravenous Once in imaging     PRN medications   Medication    acetaminophen    Or    acetaminophen    Or    acetaminophen    magnesium hydroxide    ondansetron ODT    Or    ondansetron     Continuous Medications   Medication Dose Last Rate    lactated Ringer's  150 mL/hr 150 mL/hr (08/23/24 1058)       Physical Exam:  General: Unconscious, intubated  Neck: supple; trachea midline; no masses; no JVD; intubated  Chest: clear breath sounds bilaterally; no wheezing; on Mechanical ventilation  Cardio: regular rhythm, S1S2 normal, no murmurs  Abdomen: Soft, non-tender, non-distension, no organomegaly  Extremities: no clubbing/cyanosis/edema     Assessment/Plan     Mrs. Fidelia Santiago is a 49 y.o. non-smoker female being consulted by the Cardiology team for troponin leak. Patient with prior medical history significant for bipolar disorder, dermatofibroma of the left arm, fibromyalgia, chronic pain, nonalcoholic fatty liver disease, and vitamin D deficiency. Patient intubated and cannot give reliable information at this point. History has been obtained from previous records. Per chart review,   she was brought by the EMS to the emergency room after being found unresponsive by her boyfriend. EMS noted that boyfriend was very aggressive on scene and placed into police custody. She was found unresponsive and emergently intubated by the EMS in the field. On presentation to the ER, blood pressure 118/101, respiratory rate 30, temperature 34.3, heart rate 92. Pertinent findings on blood workup; WBC 13,000, troponin 670 --> 885, potassium 7.4, creatinine 2.94, calcium 7.3, alkaline phosphatase 216, ALT 1750, AST 2674, creatinine kinase 1830 and lactic acid 2.9 ABG showed a pH of 7.10, pCO2 of 63 and pO2  of 80. U tox screen came back negative. Urinalysis came back grossly within normal limits. CT scan of the chest abdomen and pelvis showed bilateral lower lobe infiltrates compatible with pneumonia presumably aspiration. There is also bilateral perinephric stranding. And constipation. EMS gave the patient on the way 4 doses of Narcan without improvement in consciousness. Patient was given in the emergency room IV fluids, ceftriaxone, azithromycin, DuoNebs, insulin, sodium bicarbonate. Patient was found to have a sepsis [leukocytosis, hypothermia, lactic acidosis] associated with acute kidney injury, hypercapnic respiratory failure with respiratory acidosis, hypocalcemia, transaminitis, rhabdomyolysis, and elevated troponin. All in the setting of an underlying possible aspiration pneumonia of gram-negative bacteria origin. She has been admitted for clinical compensation.     Assessment     # Troponin Elevation  - Trop 670 --> 885 --> 1,624.  - Troponin elevation is likely attributable to non-ischemic myocardial injury due to myocardial imbalance in oxygen supply/demand secondary to underlying rhabdomyolysis.   - Would suggest to continue current medical management per primary team.    - Hydration.  - Echo (08/22/2024):   1. The left ventricular systolic function is moderately decreased, with a visually estimated ejection fraction of 40%.   2. There is global hypokinesis of the left ventricle with minor regional variations.   3. Spectral Doppler shows a pseudonormal pattern of left ventricular diastolic filling.   4. There is reduced right ventricular systolic function  - Once patient is more stable, we can discuss options to rule out cardiac ischemia.     # Rhabdomyolysis / HERI  - Crea 3.4  - Ph 7.15  - ALT 1376  - AST 1494  - Lactate 2.4  - Would suggest to capitalize on hydration.  - Nephrology recs.     # Aspirative Pneumonia  - Would suggest to keep broad spectrum antibiotics.  - Would suggest to titrate hydration  according to clinical status.       This critically ill patient continues to be at-risk for clinically significant deterioration / failure due to the above mentioned dysfunctional, unstable organ systems.  I have personally identified and managed all complex critical care issues to prevent aforementioned clinical deterioration.  Critical care time is spent at bedside and/or the immediate area and has included, but is not limited to, the review of diagnostic tests, labs, radiographs, serial assessments of hemodynamics, respiratory status, ventilatory management, and family updates.  Time spent in procedures and teaching are reported separately.     Critical care time: 60 minutes     Peripheral IV 08/21/24 20 G Left Antecubital (Active)   Site Assessment Clean;Dry;Intact 08/23/24 1000   Dressing Type Transparent 08/23/24 1000   Line Status Infusing 08/23/24 1000   Dressing Status Clean;Dry 08/23/24 1000   Number of days: 2       Peripheral IV 08/21/24 16 G Right Antecubital (Active)   Site Assessment Clean;Dry;Intact 08/23/24 1000   Dressing Type Transparent 08/23/24 1000   Line Status Flushed 08/23/24 1000   Dressing Status Clean;Dry 08/23/24 1000   Number of days: 2       ETT  6.5 mm (Active)   Secured at (cm) 23 cm 08/23/24 1132   Measured from Lips 08/23/24 1132   Secured Location Left 08/23/24 1132   Secured by Commercial tube felipe 08/23/24 1132   Site Condition Dry 08/23/24 1132   Number of days: 2       NG/OG/Feeding Tube OG - [place] sump 14 Fr Center mouth (Active)   Site Assessment Clean;Dry;Intact 08/23/24 0635   Number of days: 2       Urethral Catheter Straight-tip 16 Fr. (Active)   Reason for Continuing Urinary Catheterization accurate hourly measurement of urine volume in a critically ill patient that cannot be assessed by other volumes and urine collection strategies 08/23/24 0800   Number of days: 2     Code Status:  Full Code    Jason Berman MD  Cardiology

## 2024-08-23 NOTE — PROGRESS NOTES
Fidelia Santiago is a 49 y.o. female on day 1 of admission presenting with Unresponsiveness.      Subjective   Patient seen and examined at the bedside this morning  Discussed overnight issues and events with staff  Overnight she made about 225 cc of urine  Vitals have remained stable  This a.m. was placed on pressure support and is doing well       Objective          Vitals 24HR  Heart Rate:  []   Temp:  [36.2 °C (97.2 °F)-36.7 °C (98.1 °F)]   Resp:  [12-20]   BP: (120-146)/()   SpO2:  [96 %-99 %]         Intake/Output last 3 Shifts:    Intake/Output Summary (Last 24 hours) at 8/23/2024 0807  Last data filed at 8/23/2024 0632  Gross per 24 hour   Intake 2858.33 ml   Output 475 ml   Net 2383.33 ml       Physical Exam  Constitutional:       Comments: This a.m. she is starting to respond to some stimuli   HENT:      Head: Normocephalic and atraumatic.      Right Ear: External ear normal.      Left Ear: External ear normal.      Nose: Nose normal.      Mouth/Throat:      Mouth: Mucous membranes are moist.      Pharynx: Oropharynx is clear.      Comments: Endotracheal and feeding tube in place  Eyes:      Comments: She does just barely open eyes today and moves her eyes a little bit to the side   Cardiovascular:      Rate and Rhythm: Regular rhythm. Tachycardia present.   Pulmonary:      Effort: Pulmonary effort is normal.      Breath sounds: Normal breath sounds.   Abdominal:      General: Abdomen is flat.      Palpations: Abdomen is soft.   Genitourinary:     Comments: Garcia catheter in place  Skin:     General: Skin is warm and dry.   Neurological:      Comments: She is starting to respond to some noxious stimuli also has some withdrawal of her lower extremities     Scheduled medications  ampicillin-sulbactam, 3 g, intravenous, q12h  bisacodyl, 10 mg, rectal, Daily  oxygen, , inhalation, Continuous - Inhalation  pantoprazole, 40 mg, intravenous, q24h  perflutren protein A microsphere, 0.5 mL,  intravenous, Once in imaging  potassium chloride, 40 mEq, nasogastric tube, BID  sulfur hexafluoride microsphr, 2 mL, intravenous, Once in imaging      Continuous medications  lactated Ringer's, 150 mL/hr      PRN medications  PRN medications: acetaminophen **OR** acetaminophen **OR** acetaminophen, magnesium hydroxide, ondansetron ODT **OR** ondansetron      Relevant Results               Assessment/Plan   This patient currently has cardiac telemetry ordered; if you would like to modify or discontinue the telemetry order, click here to go to the orders activity to modify/discontinue the order.          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  Rhabdomyolysis  Hypokalemia  Acute respiratory failure  Bilateral infiltrates with possible aspiration  Comatose state currently: Starting to respond to noxious stimuli  Elevated troponin  Drug overdose with unknown downtime    Plan:  She seems to be showing some slow signs of improvement from the neurological standpoint  She is starting to respond to noxious stimuli and withdraw  I am going to stop bicarbonate therapy her pH is now up to 7.5  We will place her on lactated Ringer's at 150 cc/h  We will replace potassium  Start tube feeds  Heparin for DVT prophylaxis  PPI for GI prophylaxis  Continue current respiratory support as she is on  Currently on pressure support and tolerating well she can use pressure support versus control based on how she progresses during the day  Continue to follow strict ins and outs.  Her urine output is starting to   Renal function continues to worsen which is expected  She is on Unasyn day 2  We will have to follow and see how she does with time as she was likely down for an extended period of time and needs to have time for neurological recovery  Continue rest of supportive measures as she is on  I spent 30 minutes of critical care time directly involved in patient care excluding  billable procedures           Tomás Randall, DO

## 2024-08-23 NOTE — PROGRESS NOTES
"Per medical team, patient continues to be on the ventilator, is not responding much to stimuli, and patient's prognosis is guarded; patient will likely need to remain in the hospital through the weekend.  stood at patient's bedside and spoke to patient, assuring patient that the [facility] team cares about patient and is working toward patient's recovery. Patient still has not received any visitors, and has no contacts on file apart from Carmela /Shyann Arroyo ([phone]). TOMI/Shyann did find a potential contact from previous Carmela records: Annette Alvares/Heather ([phone]) SW left general voicemal message for same--voicemail does identify owner as \"Annette\"--asking for Annette to phone [facility]. Per EMS report form ED, patient's boyfriend was aggressive at scene of patient's EMS call and was placed in police custody. Per [place] [employer] whose team brought patient to ED on 8/21/24, patient's acquaintance/\"boyfriend\" who was at scene is cannot be recommended by Dispatch as a safe emergency contact for patient. SW expressed understanding. Patient's plan is TBD pending patient's progress and [facility]'s success in finding any other family/friends who are appropriate to serve as emergency contacts. Care Transitions to follow and assist should any new needs arise. VERNA Garcia    "

## 2024-08-23 NOTE — CONSULTS
Nutrition Initial Assessment:   Nutrition Assessment    Reason for Assessment: Admission nursing screening, Tube feeding assessment and order    Medical history per chart:   Fidelia Santiago is a 49 y.o. female  Who was brought by the EMS to the emergency room after being found unresponsive.  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  Rhabdomyolysis  Hypokalemia  Acute respiratory failure  Bilateral infiltrates with possible aspiration  Comatose state currently: Starting to respond to noxious stimuli  Elevated troponin  Drug overdose with unknown downtime    ~Nutrition consulted for initiate and manage tube feeds, Consulted with gale Tatum to start TF with water flush     Nutrition History:  Food and Nutrient History: Unable to assess nutrition status at this time       Current Diet: No diet orders on file    Nutrition Related Findings:   Oral Symptoms: Teeth: Intact   GI symptoms: RD unable to assess GI symptoms at this time., constipation at admission, +bm yesterday  BM: Last BM Date: 08/22/24  Food allergies: NKFA.  Meds/Labs reviewed.  ampicillin-sulbactam, 3 g, intravenous, q12h  bisacodyl, 10 mg, rectal, Daily  heparin (porcine), 5,000 Units, subcutaneous, q8h CAMI  oxygen, , inhalation, Continuous - Inhalation  pantoprazole, 40 mg, intravenous, q24h  perflutren protein A microsphere, 0.5 mL, intravenous, Once in imaging  potassium chloride, 40 mEq, nasogastric tube, BID  sulfur hexafluoride microsphr, 2 mL, intravenous, Once in imaging      lactated Ringer's, 150 mL/hr      Nutrition Significant Labs:  Results from last 7 days   Lab Units 08/23/24  0357 08/22/24  1557 08/22/24  0612 08/21/24 2016   GLUCOSE mg/dL 132* 147* 98 123*   SODIUM mmol/L 142 141 140 138   POTASSIUM mmol/L 3.0* 4.2 5.0 7.4*   CHLORIDE mmol/L 99 107 107 104   CO2 mmol/L 30 23 21 25   BUN mg/dL 53* 49* 45* 39*   CREATININE mg/dL 5.01* 4.17* 3.40* 2.94*   EGFR mL/min/1.73m*2 10* 12* 16* 19*   CALCIUM mg/dL 7.1* 7.6*  "7.8* 7.3*   PHOSPHORUS mg/dL 4.9  --   --   --    MAGNESIUM mg/dL 1.62  --   --  2.26     Lab Results   Component Value Date    HGBA1C 5.1 12/15/2020     Results from last 7 days   Lab Units 08/21/24  2325   POCT GLUCOSE mg/dL 146*     Anthropometrics:  Height: 162.6 cm (5' 4\")   Weight: 84 kg (185 lb 3 oz)   BMI (Calculated): 31.77  IBW/kg (Dietitian Calculated): 54.5 kg        Weight History:   Wt Readings from Last 10 Encounters:   08/22/24 84 kg (185 lb 3 oz)   12/14/23 72.6 kg (160 lb)   11/27/23 72.6 kg (160 lb)   10/19/23 76.2 kg (168 lb)      Weight Change %:  Weight History / % Weight Change: 08/22/24 84 kg (185 lb 3 oz)  12/14/23 72.6 kg (160 lb)           Nutrition Focused Physical Exam Findings:  defer: RD covering remotely  Subcutaneous Fat Loss:   Orbital Fat Pads: Defer  Buccal Fat Pads: Defer  Triceps: Defer  Muscle Wasting:  Temporalis: Defer  Pectoralis (Clavicular Region): Defer  Deltoid/Trapezius: Defer  Interosseous: Defer  Trapezius/Infraspinatus/Supraspinatus (Scapular Region): Defer  Quadriceps: Defer  Gastrocnemius: Defer  Edema:  Edema: none  Physical Findings:  Skin: Negative    Estimated Needs:   Total Energy Estimated Needs (kCal): 1635 kCal  Method for Estimating Needs: 30 kcal/kg  Total Protein Estimated Needs (g): 65 g  Method for Estimating Needs: 1.2 g/kg  Total Fluid Estimated Needs (mL):  (other)  Method for Estimating Needs: per MD or 35 ml/kg, 1900 mL        Nutrition Diagnosis   Nutrition Diagnosis:  Malnutrition Diagnosis  Patient has Malnutrition Diagnosis:  (insufficient data)    Nutrition Diagnosis  Patient has Nutrition Diagnosis: Yes  Diagnosis Status (1): New  Nutrition Diagnosis 1: Inadequate oral intake  Related to (1): mechanical vent  As Evidenced by (1): npo status, need for enteral nutrition       Nutrition Interventions/Recommendations   Nutrition Interventions and Recommendations:        Nutrition Prescription:  Individualized Nutrition Prescription Provided for " : Suggest Vital 1.5 @ 45 mL/hr. Start @ 25 mL/hr and increase as tolerated by 10 mL/hr every 4 hours; TF provides: 1080 mL volume, 1620 calories, 72 grams protein, 825 mL free water. Water flushes per MD or 150 mL every 4 hours        Nutrition Interventions:   Food and/or Nutrient Delivery Interventions  Interventions: Enteral intake  Enteral Intake: Other (Comment)  Goal: iniate TF, tolerate TF within 48 hours    Coordination of Nutrition Care by a Nutrition Professional  Collaboration and Referral of Nutrition Care: Collaboration by nutrition professional with other providers  Goal: Discussed wtih MD, RN    Nutrition Education:   Education Documentation  No documentation found.      Nutrition Counseling  Counseling Theoretical Approach: Other (Comment)  Goal: defer       Nutrition Monitoring and Evaluation   Monitoring/Evaluation:   Food/Nutrient Related History Monitoring  Monitoring and Evaluation Plan: Energy intake  Energy Intake: Estimated energy intake  Criteria: meet >75% of estimated needs    Body Composition/Growth/Weight History  Monitoring and Evaluation Plan: Weight  Weight: Measured weight  Criteria: stable    Biochemical Data, Medical Tests and Procedures  Monitoring and Evaluation Plan: Electrolyte/renal panel  Electrolyte and Renal Panel: Potassium  Criteria: wnl                 Time Spent/Follow-up Reminder:   Follow Up  Time Spent (min): 45 minutes  Last Date of Nutrition Visit: 08/23/24  Nutrition Follow-Up Needed?: Dietitian to reassess per policy, 3-5 days  Follow up Comment: TF

## 2024-08-23 NOTE — PROGRESS NOTES
Fidelia Stringer is a 49 y.o. female on day 1 of admission presenting with Unresponsiveness.      Subjective   Patient seen and examined the bedside.  She is unresponsive as of this morning       Objective     Last Recorded Vitals  BP (!) 141/95 (BP Location: Right arm, Patient Position: Lying)   Pulse 99   Temp 36 °C (96.8 °F) (Temporal)   Resp 14   Wt 84 kg (185 lb 3 oz)   SpO2 96%   Intake/Output last 3 Shifts:    Intake/Output Summary (Last 24 hours) at 8/23/2024 1555  Last data filed at 8/23/2024 1327  Gross per 24 hour   Intake 4563.5 ml   Output 525 ml   Net 4038.5 ml       Admission Weight  Weight: 79.4 kg (175 lb) (08/21/24 2007)    Daily Weight  08/22/24 : 84 kg (185 lb 3 oz)    Image Results  Transthoracic Echo (TTE) [place] [address]  Phone [phone], Fax [phone]    TRANSTHORACIC ECHOCARDIOGRAM REPORT    Patient Name:      FIDELIA STRINGER     Reading Physician:    [id number] Chase Shi MD  Study Date:        8/22/2024             Ordering Provider:    [id number] SHOAIB JUDGE  MRN/PID:           [id number]              Fellow:  Accession#:        [id number]          Nurse:                Taylor Palacio RN  Date of Birth/Age: 1975 / 49 years   Sonographer:          HUAN Gonsalez RVT  Gender:            F                     Additional Staff:  Height:            162.56 cm             Admit Date:           8/21/2024  Weight:            83.92 kg              Admission Status:     Inpatient -                                                                 Routine  BSA / BMI:         1.89 m2 / 31.76 kg/m2 Department Location:  [address]-ICU  Blood Pressure: 112 /80 mmHg    Study Type:    TRANSTHORACIC ECHO (TTE)  LIMITED  Diagnosis/ICD: Elevated Troponin-R79.89  Indication:    Elevated Troponin  CPT Codes:     Echo Complete w Full Doppler-[id number]    Patient History:  Pertinent History: No previous echo.    Study Detail: The following Echo studies were performed: M-Mode, 2D, Doppler and                color flow. The patient is intubated. Definity used as a contrast                agent for endocardial border definition. Total contrast used for                this procedure was 2 mL via IV push. A bubble study was not                performed. The patient was sedated.       PHYSICIAN INTERPRETATION:  Left Ventricle: The left ventricular systolic function is moderately decreased, with a visually estimated ejection fraction of 40%. There is global hypokinesis of the left ventricle with minor regional variations. The left ventricular cavity size is normal. Spectral Doppler shows a pseudonormal pattern of left ventricular diastolic filling.  Left Atrium: The left atrium is normal in size.  Right Ventricle: The right ventricle is normal in size. There is reduced right ventricular systolic function.  Right Atrium: The right atrium is normal in size.  Aortic Valve: The aortic valve is trileaflet. The aortic valve dimensionless index is 0.59. There is no evidence of aortic valve regurgitation. The peak instantaneous gradient of the aortic valve is 5.9 mmHg. The mean gradient of the aortic valve is 3.0 mmHg.  Mitral Valve: The mitral valve is normal in structure. There is trace mitral valve regurgitation.  Tricuspid Valve: The tricuspid valve is structurally normal. No evidence of tricuspid regurgitation.  Pulmonic Valve: The pulmonic valve is not well visualized. There is no indication of pulmonic valve regurgitation.  Pericardium: There is no pericardial effusion noted.  Aorta: The aortic root is normal.  In comparison to the previous echocardiogram(s): There are no prior studies on this patient for comparison purposes.        CONCLUSIONS:   1. The left ventricular systolic function is moderately decreased, with a visually estimated ejection fraction of 40%.   2. There is global hypokinesis of the left ventricle with minor regional variations.   3. Spectral Doppler shows a pseudonormal pattern of left ventricular diastolic filling.   4. There is reduced right ventricular systolic function.    QUANTITATIVE DATA SUMMARY:  2D MEASUREMENTS:                           Normal Ranges:  Ao Root d:     2.00 cm   (2.0-3.7cm)  LAs:           2.50 cm   (2.7-4.0cm)  IVSd:          0.89 cm   (0.6-1.1cm)  LVPWd:         0.89 cm   (0.6-1.1cm)  LVIDd:         3.79 cm   (3.9-5.9cm)  LVIDs:         2.99 cm  LV Mass Index: 52.5 g/m2  LV % FS        21.1 %    LA VOLUME:                                Normal Ranges:  LA Vol A4C:        24.0 ml    (22+/-6mL/m2)  LA Vol A2C:        26.3 ml  LA Vol BP:         27.0 ml  LA Vol Index A4C:  12.7ml/m2  LA Vol Index A2C:  13.9 ml/m2  LA Vol Index BP:   14.3 ml/m2  LA Area A4C:       11.5 cm2  LA Area A2C:       11.2 cm2  LA Major Axis A4C: 4.7 cm  LA Major Axis A2C: 4.0 cm  LA Volume Index:   13.5 ml/m2  LA Vol A4C:        23.0 ml  LA Vol A2C:        25.6 ml  LA Vol Index BSA:  12.8 ml/m2    M-MODE MEASUREMENTS:                   Normal Ranges:  AoV Exc: 1.80 cm (1.5-2.5cm)    AORTA MEASUREMENTS:                   Normal Ranges:  AoV Exc: 1.80 cm (1.5-2.5cm)    LV SYSTOLIC FUNCTION BY 2D PLANIMETRY (MOD):                       Normal Ranges:  EF-A4C View:    47 % (>=55%)  EF-A2C View:    49 %  EF-Biplane:     49 %  EF-Visual:      40 %  LV EF Reported: 40 %    LV DIASTOLIC FUNCTION:                          Normal Ranges:  MV Peak E:    0.82 m/s  (0.7-1.2 m/s)  MV Peak A:    0.80 m/s  (0.42-0.7 m/s)  E/A Ratio:    1.03      (1.0-2.2)  MV e'         0.095 m/s (>8.0)  MV lateral e' 0.11 m/s  MV medial e'  0.08 m/s  E/e' Ratio:   8.66      (<8.0)    MITRAL VALVE:                  Normal Ranges:  MV DT: 123 msec  (150-240msec)    AORTIC VALVE:                                    Normal Ranges:  AoV Vmax:                1.21 m/s (<=1.7m/s)  AoV Peak P.9 mmHg (<20mmHg)  AoV Mean PG:             3.0 mmHg (1.7-11.5mmHg)  LVOT Max Everett:            0.89 m/s (<=1.1m/s)  AoV VTI:                 25.20 cm (18-25cm)  LVOT VTI:                14.80 cm  LVOT Diameter:           1.50 cm  (1.8-2.4cm)  AoV Area, VTI:           1.04 cm2 (2.5-5.5cm2)  AoV Area,Vmax:           1.30 cm2 (2.5-4.5cm2)  AoV Dimensionless Index: 0.59       RIGHT VENTRICLE:  RV Basal 2.53 cm  RV Mid   1.79 cm  RV Major 5.6 cm  TAPSE:   11.4 mm    TRICUSPID VALVE/RVSP:                              Normal Ranges:  Peak TR Velocity: 2.27 m/s  RV Syst Pressure: 23.6 mmHg (< 30mmHg)    PULMONIC VALVE:                          Normal Ranges:  PV Accel Time: 70 msec  (>120ms)  PV Max Everett:    1.0 m/s  (0.6-0.9m/s)  PV Max PG:     3.6 mmHg       [id number] Chase Shi MD  Electronically signed on 2024 at 12:18:17 PM       ** Final **  ECG 12 lead  Sinus tachycardia  Otherwise normal ECG  No previous ECGs available  XR chest abdomen for OG NG placement  Narrative: Interpreted By:  Domenic Kennedy,   STUDY:  XR CHEST ABDOMEN FOR OG NG PLACEMENT; ;  2024 11:37 pm      INDICATION:  Signs/Symptoms:OG placement.      COMPARISON:  None.      ACCESSION NUMBER(S):  [id number]      ORDERING CLINICIAN:  BARBIE PRINCE      FINDINGS:  Feeding tube tip terminates in the left upper abdomen at level of the  stomach.      Bilateral opacities in lower lungs compatible with pneumonia is  better assessed on the CT chest examination.      There is limited evaluation of abdomen with external density  obscuring the lower abdomen.      Impression: Feeding tube tip terminates in the left upper abdomen at level of the  stomach.          MACRO:  None      Signed by: Domenic Kennedy 2024 12:31 AM  Dictation workstation:   [id number]      Physical Exam  Constitutional:        Comments: Unresponsive, intubated   HENT:      Head: Normocephalic.      Right Ear: Tympanic membrane normal.      Nose: Nose normal.      Mouth/Throat:      Mouth: Mucous membranes are moist.   Cardiovascular:      Rate and Rhythm: Normal rate and regular rhythm.   Pulmonary:      Effort: Pulmonary effort is normal.   Abdominal:      Palpations: Abdomen is soft.   Neurological:      Comments: Unable to assess         Relevant Results               Assessment/Plan   This patient currently has cardiac telemetry ordered; if you would like to modify or discontinue the telemetry order, click here to go to the orders activity to modify/discontinue the order.      This patient has a urinary catheter   Reason for the urinary catheter remaining today? critically ill patient who need accurate urinary output measurements    This patient is intubated   Reason for patient to remain intubated today? they are unable to protect their airway        Assessment & Plan    49-year-old obese female with a past medical history of bipolar disorder, dermatofibroma of the left arm, fibromyalgia, chronic pain, nonalcoholic fatty liver disease, and vitamin D deficiency who was brought by the EMS to the emergency room after being found unresponsive by her boyfriend. EMS noted that boyfriend was very aggressive on scene and placed into police custody. In the ER, patient was found to have a sepsis [leukocytosis, hypothermia, lactic acidosis] associated with acute kidney injury, hypercapnic respiratory failure with respiratory acidosis, hypocalcemia, transaminitis, rhabdomyolysis, and elevated troponin. All in the setting of an underlying possible aspiration pneumonia of gram-negative bacteria origin.     Assessment  Acute metabolic encephalopathy  Acute hypoxic aspiratory failure requiring intubation  Aspiration pneumonia  Drug overdose  NSTEMI type II likely secondary demand ischemia  Acute renal  failure  Anuria  Rhabdomyolysis  Hypokalemia  Hypocalcemia  Shock liver  Leukocytosis    Plan  Patient continues to be intubated  Vent per ICU  Continue current ongoing care  Continue IV fluids for rhabdomyolysis  Repeat electrolytes as needed  Patient started on tube feeds  Continue to monitor creatinine and urine output  Continue IV antibiotics for aspiration pneumonia  Continue breathing treatments      DVT prophylaxis: Lovenox  GI prophylaxis: Protonix  Full code                    Junaid Rosenberg MD

## 2024-08-23 NOTE — PROGRESS NOTES
Brief Note: Care Transitions received a voicemail message from patient's emergency contact, Carmela KRISHNA/Shyann Arroyo stating that the following  was listed for the patient at [facility]: Annette Alvares/Heather [sp?] [phone]. Care Transitions to continue to attempt to find emergency contacts for this patient. Plan TBD. VERNA Garcia

## 2024-08-23 NOTE — PROGRESS NOTES
SBT tried on pt at 0722, 10/5 28%. Pt had good volumes hr 97 rr 15. Pt lasted until 0827 then went apneic and returned vent to A/C mode. Will try SBT again in afternoon. Dr. Randall was informed.

## 2024-08-23 NOTE — CARE PLAN
Problem: Safety - Adult  Goal: Free from fall injury  Outcome: Progressing     Problem: Discharge Planning  Goal: Discharge to home or other facility with appropriate resources  Outcome: Progressing     Problem: Knowledge Deficit  Goal: Patient/family/caregiver demonstrates understanding of disease process, treatment plan, medications, and discharge instructions  Outcome: Progressing     Problem: Mechanical Ventilation  Goal: Patient Will Maintain Patent Airway  Outcome: Progressing  Goal: Oral health is maintained or improved  Outcome: Progressing  Goal: Tracheostomy will be managed safely  Outcome: Progressing  Goal: ET tube will be managed safely  Outcome: Progressing  Goal: Ability to express needs and understand communication  Outcome: Progressing  Goal: Mobility/activity is maintained at optimum level for patient  Outcome: Progressing     Problem: Skin  Goal: Decreased wound size/increased tissue granulation at next dressing change  Outcome: Progressing  Goal: Participates in plan/prevention/treatment measures  Outcome: Progressing  Goal: Prevent/manage excess moisture  Outcome: Progressing  Goal: Prevent/minimize sheer/friction injuries  Outcome: Progressing  Goal: Promote/optimize nutrition  Outcome: Progressing  Goal: Promote skin healing  Outcome: Progressing     Problem: Fall/Injury  Goal: Not fall by end of shift  Outcome: Progressing  Goal: Be free from injury by end of the shift  Outcome: Progressing  Goal: Verbalize understanding of personal risk factors for fall in the hospital  Outcome: Progressing  Goal: Verbalize understanding of risk factor reduction measures to prevent injury from fall in the home  Outcome: Progressing  Goal: Use assistive devices by end of the shift  Outcome: Progressing  Goal: Pace activities to prevent fatigue by end of the shift  Outcome: Progressing     Problem: Nutrition  Goal: Less than 5 days NPO/clear liquids  Outcome: Progressing  Goal: BG  mg/dL  Outcome:  Progressing  Goal: Lab values WNL  Outcome: Progressing  Goal: Electrolytes WNL  Outcome: Progressing  Goal: Promote healing  Outcome: Progressing  Goal: Maintain stable weight  Outcome: Progressing  Goal: Reduce weight from edema/fluid  Outcome: Progressing  Goal: Gradual weight gain  Outcome: Progressing     Problem: Respiratory - Adult  Goal: Achieves optimal ventilation and oxygenation  Outcome: Progressing     Problem: Genitourinary - Adult  Goal: Absence of urinary retention  Outcome: Progressing  Goal: Urinary catheter remains patent  Outcome: Progressing     Problem: Metabolic/Fluid and Electrolytes - Adult  Goal: Electrolytes maintained within normal limits  Outcome: Progressing  Goal: Hemodynamic stability and optimal renal function maintained  Outcome: Progressing  Goal: Glucose maintained within prescribed range  Outcome: Progressing     Problem: Respiratory  Goal: Clear secretions with interventions this shift  Outcome: Progressing  Goal: Minimize anxiety/maximize coping throughout shift  Outcome: Progressing  Goal: Minimal/no exertional discomfort or dyspnea this shift  Outcome: Progressing  Goal: No signs of respiratory distress (eg. Use of accessory muscles. Peds grunting)  Outcome: Progressing  Goal: Patent airway maintained this shift  Outcome: Progressing  Goal: Tolerate mechanical ventilation evidenced by VS/agitation level this shift  Outcome: Progressing  Goal: Tolerate pulmonary toileting this shift  Outcome: Progressing  Goal: Verbalize decreased shortness of breath this shift  Outcome: Progressing  Goal: Wean oxygen to maintain O2 saturation per order/standard this shift  Outcome: Progressing  Goal: Increase self care and/or family involvement in next 24 hours  Outcome: Progressing   The patient's goals for the shift include      The clinical goals for the shift include will have bowel movement by EOS    Pt last BM reported 8/22 during day shift. Pt remains on vent and very unresponsive  still.

## 2024-08-24 ENCOUNTER — APPOINTMENT (OUTPATIENT)
Dept: RADIOLOGY | Facility: HOSPITAL | Age: 49
End: 2024-08-24
Payer: COMMERCIAL

## 2024-08-24 LAB
ALBUMIN SERPL BCP-MCNC: 2.9 G/DL (ref 3.4–5)
ALP SERPL-CCNC: 112 U/L (ref 33–110)
ALT SERPL W P-5'-P-CCNC: 436 U/L (ref 7–45)
ANION GAP SERPL CALC-SCNC: 13 MMOL/L (ref 10–20)
ARTERIAL PATENCY WRIST A: NEGATIVE
AST SERPL W P-5'-P-CCNC: 134 U/L (ref 9–39)
BASE EXCESS BLDA CALC-SCNC: 9.2 MMOL/L (ref -2–3)
BILIRUB SERPL-MCNC: 0.4 MG/DL (ref 0–1.2)
BODY TEMPERATURE: 37 DEGREES CELSIUS
BUN SERPL-MCNC: 49 MG/DL (ref 6–23)
CALCIUM SERPL-MCNC: 7.6 MG/DL (ref 8.6–10.3)
CARDIAC TROPONIN I PNL SERPL HS: 713 NG/L (ref 0–13)
CHLORIDE SERPL-SCNC: 102 MMOL/L (ref 98–107)
CK SERPL-CCNC: 665 U/L (ref 0–215)
CO2 SERPL-SCNC: 31 MMOL/L (ref 21–32)
CREAT SERPL-MCNC: 5.69 MG/DL (ref 0.5–1.05)
EGFRCR SERPLBLD CKD-EPI 2021: 9 ML/MIN/1.73M*2
ERYTHROCYTE [DISTWIDTH] IN BLOOD BY AUTOMATED COUNT: 13.3 % (ref 11.5–14.5)
GLUCOSE BLD MANUAL STRIP-MCNC: 112 MG/DL (ref 74–99)
GLUCOSE BLD MANUAL STRIP-MCNC: 143 MG/DL (ref 74–99)
GLUCOSE SERPL-MCNC: 132 MG/DL (ref 74–99)
HCO3 BLDA-SCNC: 33.5 MMOL/L (ref 22–26)
HCT VFR BLD AUTO: 37 % (ref 36–46)
HGB BLD-MCNC: 12.1 G/DL (ref 12–16)
INHALED O2 CONCENTRATION: 28 %
MCH RBC QN AUTO: 30.1 PG (ref 26–34)
MCHC RBC AUTO-ENTMCNC: 32.7 G/DL (ref 32–36)
MCV RBC AUTO: 92 FL (ref 80–100)
NRBC BLD-RTO: 0 /100 WBCS (ref 0–0)
OXYHGB MFR BLDA: 96 % (ref 94–98)
PCO2 BLDA: 43 MM HG (ref 38–42)
PEEP CMH2O: 5 CM H2O
PH BLDA: 7.5 PH (ref 7.38–7.42)
PLATELET # BLD AUTO: 115 X10*3/UL (ref 150–450)
PO2 BLDA: 78 MM HG (ref 85–95)
POTASSIUM SERPL-SCNC: 3.5 MMOL/L (ref 3.5–5.3)
PROT SERPL-MCNC: 4.9 G/DL (ref 6.4–8.2)
RBC # BLD AUTO: 4.02 X10*6/UL (ref 4–5.2)
SAO2 % BLDA: 98 % (ref 94–100)
SODIUM SERPL-SCNC: 142 MMOL/L (ref 136–145)
SPECIMEN DRAWN FROM PATIENT: ABNORMAL
TIDAL VOLUME: 350 ML
VENTILATOR MODE: ABNORMAL
VENTILATOR RATE: 12 BPM
WBC # BLD AUTO: 10.4 X10*3/UL (ref 4.4–11.3)

## 2024-08-24 PROCEDURE — 31720 CLEARANCE OF AIRWAYS: CPT

## 2024-08-24 PROCEDURE — 82805 BLOOD GASES W/O2 SATURATION: CPT | Performed by: INTERNAL MEDICINE

## 2024-08-24 PROCEDURE — 94003 VENT MGMT INPAT SUBQ DAY: CPT

## 2024-08-24 PROCEDURE — 36415 COLL VENOUS BLD VENIPUNCTURE: CPT | Performed by: INTERNAL MEDICINE

## 2024-08-24 PROCEDURE — 2500000005 HC RX 250 GENERAL PHARMACY W/O HCPCS

## 2024-08-24 PROCEDURE — 99291 CRITICAL CARE FIRST HOUR: CPT | Performed by: STUDENT IN AN ORGANIZED HEALTH CARE EDUCATION/TRAINING PROGRAM

## 2024-08-24 PROCEDURE — 2020000001 HC ICU ROOM DAILY

## 2024-08-24 PROCEDURE — 80053 COMPREHEN METABOLIC PANEL: CPT | Performed by: INTERNAL MEDICINE

## 2024-08-24 PROCEDURE — 99291 CRITICAL CARE FIRST HOUR: CPT | Performed by: INTERNAL MEDICINE

## 2024-08-24 PROCEDURE — 85027 COMPLETE CBC AUTOMATED: CPT | Performed by: INTERNAL MEDICINE

## 2024-08-24 PROCEDURE — 74018 RADEX ABDOMEN 1 VIEW: CPT | Performed by: STUDENT IN AN ORGANIZED HEALTH CARE EDUCATION/TRAINING PROGRAM

## 2024-08-24 PROCEDURE — 82947 ASSAY GLUCOSE BLOOD QUANT: CPT

## 2024-08-24 PROCEDURE — 2500000004 HC RX 250 GENERAL PHARMACY W/ HCPCS (ALT 636 FOR OP/ED): Performed by: INTERNAL MEDICINE

## 2024-08-24 PROCEDURE — 94681 O2 UPTK CO2 OUTP % O2 XTRC: CPT

## 2024-08-24 PROCEDURE — 84484 ASSAY OF TROPONIN QUANT: CPT | Performed by: INTERNAL MEDICINE

## 2024-08-24 PROCEDURE — 82550 ASSAY OF CK (CPK): CPT | Performed by: INTERNAL MEDICINE

## 2024-08-24 PROCEDURE — 2500000005 HC RX 250 GENERAL PHARMACY W/O HCPCS: Performed by: INTERNAL MEDICINE

## 2024-08-24 PROCEDURE — 36600 WITHDRAWAL OF ARTERIAL BLOOD: CPT

## 2024-08-24 PROCEDURE — 74018 RADEX ABDOMEN 1 VIEW: CPT

## 2024-08-24 PROCEDURE — 94762 N-INVAS EAR/PLS OXIMTRY CONT: CPT

## 2024-08-24 RX ORDER — SODIUM CHLORIDE 9 MG/ML
INJECTION, SOLUTION INTRAMUSCULAR; INTRAVENOUS; SUBCUTANEOUS
Status: COMPLETED
Start: 2024-08-24 | End: 2024-08-24

## 2024-08-24 RX ORDER — SODIUM CHLORIDE 9 MG/ML
100 INJECTION, SOLUTION INTRAVENOUS CONTINUOUS
Status: DISCONTINUED | OUTPATIENT
Start: 2024-08-24 | End: 2024-08-26

## 2024-08-24 ASSESSMENT — PAIN - FUNCTIONAL ASSESSMENT
PAIN_FUNCTIONAL_ASSESSMENT: CPOT (CRITICAL CARE PAIN OBSERVATION TOOL)

## 2024-08-24 NOTE — CARE PLAN
The clinical goals for the shift include Pt will follow commands and have U/O greater than 30 mL/hour by the end of the shift    Over the shift, the patient did not make progress toward the following goals. Barriers to progression include inability to verbalize symptoms or understanding of education. Vital signs stable. No signs of respiratory distress. Pt still does not follow commands. No signs of pain.    Problem: Knowledge Deficit  Goal: Patient/family/caregiver demonstrates understanding of disease process, treatment plan, medications, and discharge instructions  Outcome: Not Progressing     Problem: Mechanical Ventilation  Goal: Ability to express needs and understand communication  Outcome: Not Progressing     Problem: Fall/Injury  Goal: Verbalize understanding of personal risk factors for fall in the hospital  Outcome: Not Progressing  Goal: Verbalize understanding of risk factor reduction measures to prevent injury from fall in the home  Outcome: Not Progressing  Goal: Use assistive devices by end of the shift  Outcome: Not Progressing  Goal: Pace activities to prevent fatigue by end of the shift  Outcome: Not Progressing     Problem: Respiratory  Goal: Verbalize decreased shortness of breath this shift  Outcome: Not Progressing  Goal: Wean oxygen to maintain O2 saturation per order/standard this shift  Outcome: Not Progressing  Goal: Increase self care and/or family involvement in next 24 hours  Outcome: Not Progressing

## 2024-08-24 NOTE — PROGRESS NOTES
Fidelia Santiago is a 49 y.o. female on day 2 of admission presenting with No Principal Problem: There is no principal problem currently on the Problem List. Please update the Problem List and refresh..      Subjective   Patient seen and examined this morning.  She is very minimally responding.  She is was on spontaneous respiration       Objective     Last Recorded Vitals  /87   Pulse 86   Temp 36 °C (96.8 °F) (Temporal)   Resp 16   Wt 84 kg (185 lb 3 oz)   SpO2 97%   Intake/Output last 3 Shifts:    Intake/Output Summary (Last 24 hours) at 8/24/2024 1606  Last data filed at 8/24/2024 0700  Gross per 24 hour   Intake 3168 ml   Output 773 ml   Net 2395 ml       Admission Weight  Weight: 79.4 kg (175 lb) (08/21/24 2007)    Daily Weight  08/22/24 : 84 kg (185 lb 3 oz)    Image Results  ECG 12 lead  Sinus tachycardia  Otherwise normal ECG  No previous ECGs available  See ED provider note for full interpretation and clinical correlation  Confirmed by Audra Gonzales ([id number]) on 8/23/2024 9:10:19 PM      Physical Exam  Constitutional:       Comments: Withdraws from pain, intubated on spontaneous respiration   HENT:      Head: Normocephalic.      Right Ear: Tympanic membrane normal.      Nose: Nose normal.      Mouth/Throat:      Mouth: Mucous membranes are moist.   Cardiovascular:      Rate and Rhythm: Normal rate and regular rhythm.   Pulmonary:      Effort: Pulmonary effort is normal.   Abdominal:      Palpations: Abdomen is soft.   Neurological:      Comments: Unable to assess         Relevant Results               Assessment/Plan   This patient currently has cardiac telemetry ordered; if you would like to modify or discontinue the telemetry order, click here to go to the orders activity to modify/discontinue the order.      This patient has a urinary catheter   Reason for the urinary catheter remaining today? critically ill patient who need accurate urinary output measurements    This patient is  intubated   Reason for patient to remain intubated today? they are unable to protect their airway        Assessment & Plan    49-year-old obese female with a past medical history of bipolar disorder, dermatofibroma of the left arm, fibromyalgia, chronic pain, nonalcoholic fatty liver disease, and vitamin D deficiency who was brought by the EMS to the emergency room after being found unresponsive by her boyfriend. EMS noted that boyfriend was very aggressive on scene and placed into police custody. In the ER, patient was found to have a sepsis [leukocytosis, hypothermia, lactic acidosis] associated with acute kidney injury, hypercapnic respiratory failure with respiratory acidosis, hypocalcemia, transaminitis, rhabdomyolysis, and elevated troponin. All in the setting of an underlying possible aspiration pneumonia of gram-negative bacteria origin.     Assessment  Acute metabolic encephalopathy  Acute hypoxic aspiratory failure requiring intubation  Aspiration pneumonia  Drug overdose  NSTEMI type II likely secondary demand ischemia  Acute renal failure  Anuria  Rhabdomyolysis  Hypokalemia  Hypocalcemia  Shock liver  Leukocytosis    Plan  Patient continues to be intubated, on spontaneous respiration  We will continue to monitor her today and await neurological improvement  Vent per ICU  Continue current ongoing care  Continue IV fluids for rhabdomyolysis  Creatinine is going up   Urine output is improved  Continue tube feeds  Continue to monitor creatinine and urine output  Continue IV antibiotics for aspiration pneumonia-Unasyn day 2  Continue breathing treatments      DVT prophylaxis: Lovenox  GI prophylaxis: Protonix  Full code                    Junaid Rosenberg MD

## 2024-08-24 NOTE — PROGRESS NOTES
Fidelia Santiago is a 49 y.o. female on day 2 of admission presenting with No Principal Problem: There is no principal problem currently on the Problem List. Please update the Problem List and refresh..      Subjective   Patient seen and examined at the bedside this morning  She is resting comfortably in bed  Breathing over the ventilator  Urine output at least 30/h during the night         Objective          Vitals 24HR  Heart Rate:  []   Temp:  [35.6 °C (96.1 °F)-36.8 °C (98.2 °F)]   Resp:  [14-26]   BP: (114-146)/(74-97)   SpO2:  [96 %-99 %]         Intake/Output last 3 Shifts:    Intake/Output Summary (Last 24 hours) at 8/24/2024 0844  Last data filed at 8/24/2024 0700  Gross per 24 hour   Intake 4150.5 ml   Output 1023 ml   Net 3127.5 ml       Physical Exam  Constitutional:       Comments: Responds only to noxious stimuli   HENT:      Head: Normocephalic and atraumatic.      Right Ear: External ear normal.      Left Ear: External ear normal.      Nose: Nose normal.      Mouth/Throat:      Mouth: Mucous membranes are moist.      Pharynx: Oropharynx is clear.      Comments: Endotracheal and feeding tube in place  Eyes:      Comments: She does just barely open eyes today and moves her eyes a little bit to the side  Pupils dilated   Cardiovascular:      Rate and Rhythm: Regular rhythm. Tachycardia present.   Pulmonary:      Effort: Pulmonary effort is normal.      Breath sounds: Normal breath sounds.   Abdominal:      General: Abdomen is flat.      Palpations: Abdomen is soft.   Genitourinary:     Comments: Garcia catheter in place  Skin:     General: Skin is warm and dry.   Neurological:      Comments: Positive Babinski  Does respond to noxious stimuli with decerebrate posturing of the right arm  Rotates head to the right arm as well  Not moving left at all even to noxious stimuli  Positive gag reflex  No eye response to stimuli       Scheduled medications  ampicillin-sulbactam, 3 g, intravenous,  q12h  bisacodyl, 10 mg, rectal, Daily  heparin (porcine), 5,000 Units, subcutaneous, q8h CAMI  oxygen, , inhalation, Continuous - Inhalation  pantoprazole, 40 mg, intravenous, q24h  perflutren protein A microsphere, 0.5 mL, intravenous, Once in imaging  sulfur hexafluoride microsphr, 2 mL, intravenous, Once in imaging      Continuous medications  sodium chloride 0.9%, 100 mL/hr      PRN medications  PRN medications: acetaminophen **OR** acetaminophen **OR** acetaminophen, magnesium hydroxide, ondansetron ODT **OR** ondansetron    Relevant Results               Assessment/Plan   This patient currently has cardiac telemetry ordered; if you would like to modify or discontinue the telemetry order, click here to go to the orders activity to modify/discontinue the order.          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  Rhabdomyolysis  Acute respiratory failure  Bilateral infiltrates with possible aspiration  Comatose state  Elevated troponin: Improving  Drug overdose with unknown downtime  Anoxic brain injury    Plan:  At this time she is on Unasyn which we will continue for now  I will change IV fluids to normal saline she has a respiratory and metabolic alkalosis now which was used to treat the rhabdomyolysis which we will now work to reverse  She is having good urine output and I expect her renal function will start to turn around here in the next 24 to 48 hours  We will continue normal saline  She is on a PPI for GI prophylaxis  On heparin for DVT prophylaxis  Neurologically it appears that she has a pretty severe anoxic brain injury  We will continue ventilatory support  Continue supportive measures currently and we will continue to monitor  Continue tube feeds as she is on tolerating them well currently    I spent 30 minutes of critical care time directly involved in patient care excluding billable procedures             Tomás Randall, DO

## 2024-08-24 NOTE — PROGRESS NOTES
Reviewed patient in care rounds. There has not been any changes with patient. Also we have not heard back from message that had been left by CT staff earlier. CT to follow.      VERNA Whaley

## 2024-08-24 NOTE — PROGRESS NOTES
Subjective Data:  Patient remains unresponsive at this point.     Overnight Events:    No     Objective Data:  Last Recorded Vitals:  Vitals:    08/24/24 0530 08/24/24 0600 08/24/24 0700 08/24/24 0745   BP:  122/86 125/81    BP Location:  Right arm Right arm    Patient Position:  Lying Lying    Pulse:  80 80    Resp: 26 15 16 18   Temp:       TempSrc:       SpO2: 96% 97% 98% 97%   Weight:       Height:           Last Labs:  CBC - 8/24/2024:  4:39 AM  10.4 12.1 115    37.0      CMP - 8/24/2024:  4:39 AM  7.6 4.9 134 --- 0.4   4.9 2.9 436 112      PTT - No results in last year.  _   _ _     TROPHS   Date/Time Value Ref Range Status   08/24/2024 04:39  0 - 13 ng/L Final   08/23/2024 03:57 AM 1,624 0 - 13 ng/L Final   08/21/2024 09:44  0 - 13 ng/L Final     Comment:     Previous result verified on 8/21/2024 2106 on specimen/case [id number] called with component [facility] for procedure Troponin I, High Sensitivity with value 670 ng/L.     HGBA1C   Date/Time Value Ref Range Status   12/15/2020 09:58 AM 5.1 4.0 - 5.6 % Final      Last I/O:  I/O last 3 completed shifts:  In: 6684.2 (79.6 mL/kg) [I.V.:4924.2 (58.6 mL/kg); NG/GT:1460; IV Piggyback:300]  Out: 1265 (15.1 mL/kg) [Urine:1265 (0.4 mL/kg/hr)]  Weight: 84 kg     Past Cardiology Tests (Last 3 Years):  EKG:  ECG 12 lead 08/21/2024    Echo:  Transthoracic Echo (TTE) Limited 08/22/2024    Ejection Fractions:  EF   Date/Time Value Ref Range Status   08/22/2024 06:00 AM 40 %      Cath:  No results found for this or any previous visit from the past 1095 days.    Stress Test:  No results found for this or any previous visit from the past 1095 days.    Cardiac Imaging:  XR chest abdomen for OG NG placement 08/21/2024      Inpatient Medications:  Scheduled medications   Medication Dose Route Frequency    [START ON 8/25/2024] ampicillin-sulbactam  3 g intravenous q24h    bisacodyl  10 mg rectal Daily    heparin (porcine)  5,000 Units subcutaneous q8h CAMI    oxygen    inhalation Continuous - Inhalation    pantoprazole  40 mg intravenous q24h    perflutren protein A microsphere  0.5 mL intravenous Once in imaging    sulfur hexafluoride microsphr  2 mL intravenous Once in imaging     PRN medications   Medication    acetaminophen    Or    acetaminophen    Or    acetaminophen    magnesium hydroxide    ondansetron ODT    Or    ondansetron     Continuous Medications   Medication Dose Last Rate    sodium chloride 0.9%  100 mL/hr 100 mL/hr (08/24/24 5705)       Physical Exam:  General: Unconscious, intubated  Neck: supple; trachea midline; no masses; no JVD; intubated  Chest: clear breath sounds bilaterally; no wheezing; on Mechanical ventilation  Cardio: regular rhythm, S1S2 normal, no murmurs  Abdomen: Soft, non-tender, non-distension, no organomegaly  Extremities: no clubbing/cyanosis/edema     Assessment/Plan     Mrs. Fidelia Santiago is a 49 y.o. non-smoker female being consulted by the Cardiology team for troponin leak. Patient with prior medical history significant for bipolar disorder, dermatofibroma of the left arm, fibromyalgia, chronic pain, nonalcoholic fatty liver disease, and vitamin D deficiency. Patient intubated and cannot give reliable information at this point. History has been obtained from previous records. Per chart review,   she was brought by the EMS to the emergency room after being found unresponsive by her boyfriend. EMS noted that boyfriend was very aggressive on scene and placed into police custody. She was found unresponsive and emergently intubated by the EMS in the field. On presentation to the ER, blood pressure 118/101, respiratory rate 30, temperature 34.3, heart rate 92. Pertinent findings on blood workup; WBC 13,000, troponin 670 --> 885, potassium 7.4, creatinine 2.94, calcium 7.3, alkaline phosphatase 216, ALT 1750, AST 2674, creatinine kinase 1830 and lactic acid 2.9 ABG showed a pH of 7.10, pCO2 of 63 and pO2 of 80. U tox screen came back  negative. Urinalysis came back grossly within normal limits. CT scan of the chest abdomen and pelvis showed bilateral lower lobe infiltrates compatible with pneumonia presumably aspiration. There is also bilateral perinephric stranding. And constipation. EMS gave the patient on the way 4 doses of Narcan without improvement in consciousness. Patient was given in the emergency room IV fluids, ceftriaxone, azithromycin, DuoNebs, insulin, sodium bicarbonate. Patient was found to have a sepsis [leukocytosis, hypothermia, lactic acidosis] associated with acute kidney injury, hypercapnic respiratory failure with respiratory acidosis, hypocalcemia, transaminitis, rhabdomyolysis, and elevated troponin. All in the setting of an underlying possible aspiration pneumonia of gram-negative bacteria origin. She has been admitted for clinical compensation.     Assessment     # Troponin Elevation  - Trop 670 --> 885 --> 1,624.  - Troponin elevation is likely attributable to non-ischemic myocardial injury due to myocardial imbalance in oxygen supply/demand secondary to underlying rhabdomyolysis.   - Would suggest to continue current medical management per primary team.    - Hydration.  - Echo (08/22/2024):   1. The left ventricular systolic function is moderately decreased, with a visually estimated ejection fraction of 40%.   2. There is global hypokinesis of the left ventricle with minor regional variations.   3. Spectral Doppler shows a pseudonormal pattern of left ventricular diastolic filling.   4. There is reduced right ventricular systolic function  - Once patient is more stable, we can discuss options to rule out cardiac ischemia.     # Rhabdomyolysis / HERI  - Crea 3.4  - Ph 7.15  - ALT 1376  - AST 1494  - Lactate 2.4  - Would suggest to capitalize on hydration.  - Nephrology recs.     # Aspirative Pneumonia  - Would suggest to keep broad spectrum antibiotics.  - Would suggest to titrate hydration according to clinical status.        This critically ill patient continues to be at-risk for clinically significant deterioration / failure due to the above mentioned dysfunctional, unstable organ systems.  I have personally identified and managed all complex critical care issues to prevent aforementioned clinical deterioration.  Critical care time is spent at bedside and/or the immediate area and has included, but is not limited to, the review of diagnostic tests, labs, radiographs, serial assessments of hemodynamics, respiratory status, ventilatory management, and family updates.  Time spent in procedures and teaching are reported separately.     Critical care time: 55 minutes     Peripheral IV 08/21/24 20 G Left Antecubital (Active)   Site Assessment Clean;Dry;Intact 08/24/24 0900   Dressing Status Clean;Dry;Occlusive 08/24/24 0900   Number of days: 3       Peripheral IV 08/21/24 16 G Right Antecubital (Active)   Site Assessment Clean;Dry;Intact 08/24/24 0900   Dressing Status Clean;Dry;Occlusive 08/24/24 0900   Number of days: 3       ETT  6.5 mm (Active)   Secured at (cm) 22 cm 08/24/24 0745   Measured from Lips 08/24/24 0745   Secured Location Right 08/24/24 0745   Secured by [employer] tube felipe 08/24/24 0745   Site Condition Dry;Cool 08/24/24 0745   Number of days: 3       NG/OG/Feeding Tube OG - [employer] sump 14 Fr Center mouth (Active)   Site Assessment Clean;Dry;Intact 08/24/24 0717   Number of days: 3       Urethral Catheter Straight-tip 16 Fr. (Active)   Site Assessment Clean;Skin intact 08/24/24 0700   Output (mL) 33 mL 08/24/24 0700   Number of days: 3       Code Status:  Full Code    Jason Berman MD  Cardiology

## 2024-08-24 NOTE — NURSING NOTE
Pt resting calmly in bed with eyes closed, Babinski and gag reflex positive. Pt shows decerebrate posturing when stimulated.

## 2024-08-25 LAB
ALBUMIN SERPL BCP-MCNC: 3.1 G/DL (ref 3.4–5)
ALP SERPL-CCNC: 108 U/L (ref 33–110)
ALT SERPL W P-5'-P-CCNC: 296 U/L (ref 7–45)
ANION GAP SERPL CALC-SCNC: 13 MMOL/L (ref 10–20)
ARTERIAL PATENCY WRIST A: POSITIVE
AST SERPL W P-5'-P-CCNC: 64 U/L (ref 9–39)
BACTERIA BLD CULT: NORMAL
BACTERIA BLD CULT: NORMAL
BASE EXCESS BLDA CALC-SCNC: 7.1 MMOL/L (ref -2–3)
BILIRUB SERPL-MCNC: 0.5 MG/DL (ref 0–1.2)
BODY TEMPERATURE: 37 DEGREES CELSIUS
BUN SERPL-MCNC: 51 MG/DL (ref 6–23)
CALCIUM SERPL-MCNC: 7.8 MG/DL (ref 8.6–10.3)
CHLORIDE SERPL-SCNC: 106 MMOL/L (ref 98–107)
CO2 SERPL-SCNC: 28 MMOL/L (ref 21–32)
CREAT SERPL-MCNC: 5.85 MG/DL (ref 0.5–1.05)
EGFRCR SERPLBLD CKD-EPI 2021: 8 ML/MIN/1.73M*2
ERYTHROCYTE [DISTWIDTH] IN BLOOD BY AUTOMATED COUNT: 13.2 % (ref 11.5–14.5)
GLUCOSE BLD MANUAL STRIP-MCNC: 117 MG/DL (ref 74–99)
GLUCOSE BLD MANUAL STRIP-MCNC: 132 MG/DL (ref 74–99)
GLUCOSE SERPL-MCNC: 120 MG/DL (ref 74–99)
HCO3 BLDA-SCNC: 30.1 MMOL/L (ref 22–26)
HCT VFR BLD AUTO: 37.7 % (ref 36–46)
HGB BLD-MCNC: 12.2 G/DL (ref 12–16)
INHALED O2 CONCENTRATION: 28 %
MCH RBC QN AUTO: 29.8 PG (ref 26–34)
MCHC RBC AUTO-ENTMCNC: 32.4 G/DL (ref 32–36)
MCV RBC AUTO: 92 FL (ref 80–100)
NRBC BLD-RTO: 0 /100 WBCS (ref 0–0)
OXYHGB MFR BLDA: 95.8 % (ref 94–98)
PCO2 BLDA: 36 MM HG (ref 38–42)
PEEP CMH2O: 5 CM H2O
PH BLDA: 7.53 PH (ref 7.38–7.42)
PLATELET # BLD AUTO: 127 X10*3/UL (ref 150–450)
PO2 BLDA: 75 MM HG (ref 85–95)
POTASSIUM SERPL-SCNC: 3.7 MMOL/L (ref 3.5–5.3)
PRESSURE SUPPORT: 10 CM H2O
PROT SERPL-MCNC: 5.3 G/DL (ref 6.4–8.2)
RBC # BLD AUTO: 4.1 X10*6/UL (ref 4–5.2)
SAO2 % BLDA: 98 % (ref 94–100)
SODIUM SERPL-SCNC: 143 MMOL/L (ref 136–145)
SPECIMEN DRAWN FROM PATIENT: ABNORMAL
SPONTANEOUS TIDAL VOLUME: 412 ML
VENTILATOR MODE: ABNORMAL
WBC # BLD AUTO: 10.6 X10*3/UL (ref 4.4–11.3)

## 2024-08-25 PROCEDURE — 84075 ASSAY ALKALINE PHOSPHATASE: CPT | Performed by: INTERNAL MEDICINE

## 2024-08-25 PROCEDURE — 99291 CRITICAL CARE FIRST HOUR: CPT | Performed by: INTERNAL MEDICINE

## 2024-08-25 PROCEDURE — 99291 CRITICAL CARE FIRST HOUR: CPT | Performed by: STUDENT IN AN ORGANIZED HEALTH CARE EDUCATION/TRAINING PROGRAM

## 2024-08-25 PROCEDURE — 36600 WITHDRAWAL OF ARTERIAL BLOOD: CPT

## 2024-08-25 PROCEDURE — 94003 VENT MGMT INPAT SUBQ DAY: CPT

## 2024-08-25 PROCEDURE — 36415 COLL VENOUS BLD VENIPUNCTURE: CPT | Performed by: INTERNAL MEDICINE

## 2024-08-25 PROCEDURE — 2500000005 HC RX 250 GENERAL PHARMACY W/O HCPCS: Performed by: INTERNAL MEDICINE

## 2024-08-25 PROCEDURE — 2500000005 HC RX 250 GENERAL PHARMACY W/O HCPCS

## 2024-08-25 PROCEDURE — 2500000004 HC RX 250 GENERAL PHARMACY W/ HCPCS (ALT 636 FOR OP/ED)

## 2024-08-25 PROCEDURE — 94762 N-INVAS EAR/PLS OXIMTRY CONT: CPT

## 2024-08-25 PROCEDURE — 85027 COMPLETE CBC AUTOMATED: CPT | Performed by: INTERNAL MEDICINE

## 2024-08-25 PROCEDURE — 2020000001 HC ICU ROOM DAILY

## 2024-08-25 PROCEDURE — 31720 CLEARANCE OF AIRWAYS: CPT

## 2024-08-25 PROCEDURE — 2500000004 HC RX 250 GENERAL PHARMACY W/ HCPCS (ALT 636 FOR OP/ED): Performed by: INTERNAL MEDICINE

## 2024-08-25 PROCEDURE — 82947 ASSAY GLUCOSE BLOOD QUANT: CPT

## 2024-08-25 PROCEDURE — 82805 BLOOD GASES W/O2 SATURATION: CPT | Performed by: INTERNAL MEDICINE

## 2024-08-25 RX ORDER — LEVETIRACETAM 5 MG/ML
500 INJECTION INTRAVASCULAR EVERY 12 HOURS
Status: DISCONTINUED | OUTPATIENT
Start: 2024-08-25 | End: 2024-08-28

## 2024-08-25 RX ORDER — SODIUM CHLORIDE 9 MG/ML
INJECTION, SOLUTION INTRAMUSCULAR; INTRAVENOUS; SUBCUTANEOUS
Status: COMPLETED
Start: 2024-08-25 | End: 2024-08-25

## 2024-08-25 ASSESSMENT — PAIN - FUNCTIONAL ASSESSMENT
PAIN_FUNCTIONAL_ASSESSMENT: CPOT (CRITICAL CARE PAIN OBSERVATION TOOL)

## 2024-08-25 NOTE — PROGRESS NOTES
Fidelia Santiago is a 49 y.o. female on day 3 of admission presenting with No Principal Problem: There is no principal problem currently on the Problem List. Please update the Problem List and refresh..      Subjective   Patient seen and examined at the bedside this morning  She is pretty much unchanged.  Today her exam is pretty much the same  Her eyes are deviated more to the left now  Has had good urine output       Objective          Vitals 24HR  Heart Rate:  []   Temp:  [36 °C (96.8 °F)-36.9 °C (98.4 °F)]   Resp:  [14-27]   BP: (120-164)/()   SpO2:  [94 %-98 %]         Intake/Output last 3 Shifts:    Intake/Output Summary (Last 24 hours) at 8/25/2024 0806  Last data filed at 8/25/2024 0700  Gross per 24 hour   Intake 2060.67 ml   Output 990 ml   Net 1070.67 ml       Physical Exam  Constitutional:       Comments: Responds only to noxious stimuli   HENT:      Head: Normocephalic and atraumatic.      Right Ear: External ear normal.      Left Ear: External ear normal.      Nose: Nose normal.      Mouth/Throat:      Mouth: Mucous membranes are moist.      Pharynx: Oropharynx is clear.      Comments: Endotracheal and feeding tube in place  Eyes:      Comments: She does just barely open eyes today and moves her eyes a little bit to the side  Pupils dilated   Cardiovascular:      Rate and Rhythm: Regular rhythm. Tachycardia present.   Pulmonary:      Effort: Pulmonary effort is normal.      Breath sounds: Normal breath sounds.   Abdominal:      General: Abdomen is flat.      Palpations: Abdomen is soft.   Genitourinary:     Comments: Garcia catheter in place  Skin:     General: Skin is warm and dry.   Neurological:      Comments: Positive Babinski  Does respond to noxious stimuli with decerebrate posturing of the right arm  Rotates head to the right arm as well  Not moving left at all even to noxious stimuli  Positive gag reflex  No eye response to stimuli         Relevant Results                Assessment/Plan   This patient currently has cardiac telemetry ordered; if you would like to modify or discontinue the telemetry order, click here to go to the orders activity to modify/discontinue the order.          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  Rhabdomyolysis  Acute respiratory failure  Bilateral infiltrates with possible aspiration  Comatose state  Elevated troponin: Improving  Drug overdose with unknown downtime  Anoxic brain injury    Plan:  Her neurological exam over the last 24 hours is pretty much unchanged  She does seem to have a little bit more decerebrate posturing  Her eyes are deviated more to the left today  We will go ahead and put her empirically on Keppra  I will order an EEG  She is on tube feeds tolerating them fine for now  We will leave normal saline for another 24 hours or so  She continues to be alkalotic  She is breathing over the ventilator on her own and so at this time we will just have to slowly let this come back down towards a more normal  Continue heparin for DVT prophylaxis  Continue PPI for GI prophylaxis  Continue rest of supportive measures  I spent 30 minutes of critical care time directly involved in patient care excluding any billable procedures           Tomás Randall, DO

## 2024-08-25 NOTE — PROGRESS NOTES
Subjective Data:  Patient remains unresponsive at this point. Hemodynamically stable.    Overnight Events:    No     Objective Data:  Last Recorded Vitals:  Vitals:    08/25/24 1033 08/25/24 1100 08/25/24 1131 08/25/24 1200   BP:       BP Location:       Patient Position:       Pulse:  67  75   Resp: 19 18 22 23   Temp:       TempSrc:       SpO2: 94%  97%    Weight:       Height:           Last Labs:  CBC - 8/25/2024:  4:01 AM  10.6 12.2 127    37.7      CMP - 8/25/2024:  4:01 AM  7.8 5.3 64 --- 0.5   4.9 3.1 296 108      PTT - No results in last year.  _   _ _     TROPHS   Date/Time Value Ref Range Status   08/24/2024 04:39  0 - 13 ng/L Final   08/23/2024 03:57 AM 1,624 0 - 13 ng/L Final   08/21/2024 09:44  0 - 13 ng/L Final     Comment:     Previous result verified on 8/21/2024 2106 on specimen/case [id number] called with component [facility] for procedure Troponin I, High Sensitivity with value 670 ng/L.     HGBA1C   Date/Time Value Ref Range Status   12/15/2020 09:58 AM 5.1 4.0 - 5.6 % Final      Last I/O:  I/O last 3 completed shifts:  In: 4824.7 (57.4 mL/kg) [I.V.:2856.7 (34 mL/kg); NG/GT:1768; IV Piggyback:200]  Out: 1478 (17.6 mL/kg) [Urine:1478 (0.5 mL/kg/hr)]  Weight: 84 kg     Past Cardiology Tests (Last 3 Years):  EKG:  ECG 12 lead 08/21/2024    Echo:  Transthoracic Echo (TTE) Limited 08/22/2024    Ejection Fractions:  EF   Date/Time Value Ref Range Status   08/22/2024 06:00 AM 40 %      Cath:  No results found for this or any previous visit from the past 1095 days.    Stress Test:  No results found for this or any previous visit from the past 1095 days.    Cardiac Imaging:  XR chest abdomen for OG NG placement 08/21/2024      Inpatient Medications:  Scheduled medications   Medication Dose Route Frequency    bisacodyl  10 mg rectal Daily    heparin (porcine)  5,000 Units subcutaneous q8h CAMI    levETIRAcetam  500 mg intravenous q12h    oxygen   inhalation Continuous - Inhalation     pantoprazole  40 mg intravenous q24h    perflutren protein A microsphere  0.5 mL intravenous Once in imaging    sulfur hexafluoride microsphr  2 mL intravenous Once in imaging     PRN medications   Medication    acetaminophen    Or    acetaminophen    Or    acetaminophen    magnesium hydroxide    ondansetron ODT    Or    ondansetron     Continuous Medications   Medication Dose Last Rate    sodium chloride 0.9%  100 mL/hr 100 mL/hr (08/24/24 1900)       Physical Exam:  General: Unconscious, intubated  Neck: supple; trachea midline; no masses; no JVD; intubated  Chest: clear breath sounds bilaterally; no wheezing; on Mechanical ventilation  Cardio: regular rhythm, S1S2 normal, no murmurs  Abdomen: Soft, non-tender, non-distension, no organomegaly  Extremities: no clubbing/cyanosis/edema     Assessment/Plan     Mrs. Fidelia Santiago is a 49 y.o. non-smoker female being consulted by the Cardiology team for troponin leak. Patient with prior medical history significant for bipolar disorder, dermatofibroma of the left arm, fibromyalgia, chronic pain, nonalcoholic fatty liver disease, and vitamin D deficiency. Patient intubated and cannot give reliable information at this point. History has been obtained from previous records. Per chart review,   she was brought by the EMS to the emergency room after being found unresponsive by her boyfriend. EMS noted that boyfriend was very aggressive on scene and placed into police custody. She was found unresponsive and emergently intubated by the EMS in the field. On presentation to the ER, blood pressure 118/101, respiratory rate 30, temperature 34.3, heart rate 92. Pertinent findings on blood workup; WBC 13,000, troponin 670 --> 885, potassium 7.4, creatinine 2.94, calcium 7.3, alkaline phosphatase 216, ALT 1750, AST 2674, creatinine kinase 1830 and lactic acid 2.9 ABG showed a pH of 7.10, pCO2 of 63 and pO2 of 80. U tox screen came back negative. Urinalysis came back grossly within  normal limits. CT scan of the chest abdomen and pelvis showed bilateral lower lobe infiltrates compatible with pneumonia presumably aspiration. There is also bilateral perinephric stranding. And constipation. EMS gave the patient on the way 4 doses of Narcan without improvement in consciousness. Patient was given in the emergency room IV fluids, ceftriaxone, azithromycin, DuoNebs, insulin, sodium bicarbonate. Patient was found to have a sepsis [leukocytosis, hypothermia, lactic acidosis] associated with acute kidney injury, hypercapnic respiratory failure with respiratory acidosis, hypocalcemia, transaminitis, rhabdomyolysis, and elevated troponin. All in the setting of an underlying possible aspiration pneumonia of gram-negative bacteria origin. She has been admitted for clinical compensation.     Assessment     # Troponin Elevation  - Trop 670 --> 885 --> 1,624 --> 713.  - Troponin elevation is likely attributable to non-ischemic myocardial injury due to myocardial imbalance in oxygen supply/demand secondary to underlying rhabdomyolysis.   - Would suggest to continue current medical management per primary team.    - Hydration.  - Echo (08/22/2024):   1. The left ventricular systolic function is moderately decreased, with a visually estimated ejection fraction of 40%.   2. There is global hypokinesis of the left ventricle with minor regional variations.   3. Spectral Doppler shows a pseudonormal pattern of left ventricular diastolic filling.   4. There is reduced right ventricular systolic function  - Once patient is more stable, we can discuss options to rule out cardiac ischemia.     # Rhabdomyolysis / HERI  - Crea 3.4 --> 5.8  - Ph 7.15 --> 7.5  - ALT 1376 --> 296  - AST 1494 --> 64  - Lactate 2.4  - Would suggest to capitalize on hydration.  - Nephrology recs.     # Aspirative Pneumonia  - Would suggest to keep broad spectrum antibiotics.  - Would suggest to titrate hydration according to clinical status.        This critically ill patient continues to be at-risk for clinically significant deterioration / failure due to the above mentioned dysfunctional, unstable organ systems.  I have personally identified and managed all complex critical care issues to prevent aforementioned clinical deterioration.  Critical care time is spent at bedside and/or the immediate area and has included, but is not limited to, the review of diagnostic tests, labs, radiographs, serial assessments of hemodynamics, respiratory status, ventilatory management, and family updates.  Time spent in procedures and teaching are reported separately.     Critical care time: 50 minutes     Peripheral IV 08/21/24 20 G Left Antecubital (Active)   Site Assessment Clean;Dry;Intact 08/25/24 0900   Dressing Status Clean;Dry;Occlusive 08/25/24 0900   Number of days: 4       Peripheral IV 08/21/24 16 G Right Antecubital (Active)   Site Assessment Clean;Dry;Intact 08/25/24 0900   Dressing Status Clean;Dry;Occlusive 08/25/24 0900   Number of days: 4       ETT  6.5 mm (Active)   Secured at (cm) 22 cm 08/25/24 1131   Measured from Lips 08/25/24 1131   Secured Location Center 08/25/24 1131   Secured by Commercial tube felipe 08/25/24 1131   Site Condition Dry;Cool 08/25/24 1131   Number of days: 4       NG/OG/Feeding Tube OG - [place] sump 14 Fr Center mouth (Active)   Site Assessment Clean;Dry;Intact 08/25/24 0717   Number of days: 4       Urethral Catheter Straight-tip 16 Fr. (Active)   Site Assessment Clean;Skin intact 08/25/24 0700   Output (mL) 85 mL 08/25/24 0700   Number of days: 4     Code Status:  Full Code    Jason Berman MD  Cardiology

## 2024-08-25 NOTE — NURSING NOTE
Received a phone call from krystle Santaigo ([phone]) who states he is the patient's father. Informed that he could speak with providers in person, but not much could be given over the phone.

## 2024-08-25 NOTE — PROGRESS NOTES
Spoke with RN that  had called in with the phone numbers of Annette [phone] and also [employer] [phone]. SW did call Annette and she did answer. She said that she had just been on the phone with patients . Annette is trying to message patients father and daughter via Facebook as she does not have a phone number for them. SW did leave phone number for her to return call if she does make contact with them. Yulissa will update RN.  RN notified YULISSA that the father did call in and is on his way to the hospital. His name is Richi Santiago. [phone].    VERNA Whaley

## 2024-08-25 NOTE — PROGRESS NOTES
Fidelia Santiago is a 49 y.o. female on day 3 of admission presenting with No Principal Problem: There is no principal problem currently on the Problem List. Please update the Problem List and refresh..      Subjective   Patient seen and examined this morning.  She is very minimally responding More like posturing    Objective     Last Recorded Vitals  BP (!) 154/92   Pulse 67   Temp 36.5 °C (97.7 °F) (Temporal)   Resp 18   Wt 84 kg (185 lb 3 oz)   SpO2 94%   Intake/Output last 3 Shifts:    Intake/Output Summary (Last 24 hours) at 8/25/2024 1129  Last data filed at 8/25/2024 0700  Gross per 24 hour   Intake 2060.67 ml   Output 990 ml   Net 1070.67 ml       Admission Weight  Weight: 79.4 kg (175 lb) (08/21/24 2007)    Daily Weight  08/22/24 : 84 kg (185 lb 3 oz)    Image Results  XR abdomen 1 view  Narrative: Interpreted By:  Itz Dumont,   STUDY:  XR ABDOMEN 1 VIEW;  8/24/2024 7:47 pm      INDICATION:  Signs/Symptoms:OG tube placement.      COMPARISON:  08/21/2024      ACCESSION NUMBER(S):  [id number]      ORDERING CLINICIAN:  SHOAIB JUDGE      FINDINGS:  NG/OG tube is coiled over the gastric body.      To the extent visualized, there are gas distended bowel loops the  appear in a nonobstructive pattern.      There are right upper quadrant cholecystectomy clips.      There is no evidence of free intraperitoneal air.      No pneumatosis or portal venous gas identified.      No acute osseous abnormalities.      The lung bases are clear.      Impression: NG/OG tube is coiled over the gastric body.          MACRO:  None.      Signed by: Itz Dumont 8/24/2024 8:19 PM  Dictation workstation:   [id number]      Physical Exam  Constitutional:       Appearance: Normal appearance.      Comments: Withdraws from pain, intubated on spontaneous respiration   HENT:      Head: Normocephalic.      Right Ear: Tympanic membrane normal.      Nose: Nose normal.      Mouth/Throat:      Mouth: Mucous membranes are  moist.   Eyes:      Pupils: Pupils are equal, round, and reactive to light.   Cardiovascular:      Rate and Rhythm: Normal rate and regular rhythm.   Pulmonary:      Effort: Pulmonary effort is normal.      Comments: On Spontaneous ventilation  Abdominal:      General: Bowel sounds are normal.      Palpations: Abdomen is soft.   Musculoskeletal:      Cervical back: Normal range of motion.   Neurological:      Mental Status: She is alert.      Comments: Unable to assess  Decerebrate posturing          Relevant Results               Assessment/Plan   This patient currently has cardiac telemetry ordered; if you would like to modify or discontinue the telemetry order, click here to go to the orders activity to modify/discontinue the order.      This patient has a urinary catheter   Reason for the urinary catheter remaining today? critically ill patient who need accurate urinary output measurements    This patient is intubated   Reason for patient to remain intubated today? they are unable to protect their airway        Assessment & Plan    49-year-old obese female with a past medical history of bipolar disorder, dermatofibroma of the left arm, fibromyalgia, chronic pain, nonalcoholic fatty liver disease, and vitamin D deficiency who was brought by the EMS to the emergency room after being found unresponsive by her boyfriend. EMS noted that boyfriend was very aggressive on scene and placed into police custody. In the ER, patient was found to have a sepsis [leukocytosis, hypothermia, lactic acidosis] associated with acute kidney injury, hypercapnic respiratory failure with respiratory acidosis, hypocalcemia, transaminitis, rhabdomyolysis, and elevated troponin. All in the setting of an underlying possible aspiration pneumonia of gram-negative bacteria origin.     Assessment  Acute metabolic encephalopathy  Acute hypoxic aspiratory failure requiring intubation  Aspiration pneumonia  Drug overdose  NSTEMI type II likely  secondary demand ischemia  Acute renal failure  Anuria  Rhabdomyolysis  Hypokalemia  Hypocalcemia  Shock liver  Leukocytosis    Plan  Patient not responding much  There is no decerebrate posturing  She started on Keppra  Patient continues to be intubated, on spontaneous respiration  We will continue to monitor her today and await neurological improvement  Vent per ICU  Continue current ongoing care  Continue IV fluids for rhabdomyolysis  Creatinine is going up   Urine output is improved  Continue tube feeds  Continue to monitor creatinine and urine output  Continue IV antibiotics for aspiration pneumonia-Unasyn day 3  Continue breathing treatments  Continue IV fluids  Monitor for hypoglycemia       DVT prophylaxis: Lovenox  GI prophylaxis: Protonix  Full code                    Junaid Rosenberg MD

## 2024-08-25 NOTE — CARE PLAN
The clinical goals for the shift include Pt will maintain U/O greater than 30 mL/hour, SpO2 greater than 90%, no fever, and RR less than 25 throughout the shift    Clinical goals met aside from occasional tachypnea.  Over the shift, the patient did not make progress toward the following goals. Pt unable to follow commands or verbalize for herself. Vital signs stable. Pt continues to have decerebrate posturing.    Problem: Fall/Injury  Goal: Verbalize understanding of personal risk factors for fall in the hospital  Outcome: Not Progressing  Goal: Verbalize understanding of risk factor reduction measures to prevent injury from fall in the home  Outcome: Not Progressing  Goal: Use assistive devices by end of the shift  Outcome: Not Progressing  Goal: Pace activities to prevent fatigue by end of the shift  Outcome: Not Progressing     Problem: Respiratory  Goal: Verbalize decreased shortness of breath this shift  Outcome: Not Progressing  Goal: Increase self care and/or family involvement in next 24 hours  Outcome: Not Progressing

## 2024-08-26 VITALS
DIASTOLIC BLOOD PRESSURE: 95 MMHG | OXYGEN SATURATION: 97 % | WEIGHT: 201.5 LBS | HEIGHT: 64 IN | RESPIRATION RATE: 19 BRPM | TEMPERATURE: 96.8 F | SYSTOLIC BLOOD PRESSURE: 149 MMHG | BODY MASS INDEX: 34.4 KG/M2 | HEART RATE: 72 BPM

## 2024-08-26 LAB
ALBUMIN SERPL BCP-MCNC: 3 G/DL (ref 3.4–5)
ALP SERPL-CCNC: 96 U/L (ref 33–110)
ALT SERPL W P-5'-P-CCNC: 186 U/L (ref 7–45)
ANION GAP SERPL CALC-SCNC: 14 MMOL/L (ref 10–20)
AST SERPL W P-5'-P-CCNC: 33 U/L (ref 9–39)
BACTERIA BLD CULT: NORMAL
BACTERIA BLD CULT: NORMAL
BASE EXCESS BLDA CALC-SCNC: 3.7 MMOL/L (ref -2–3)
BILIRUB SERPL-MCNC: 0.4 MG/DL (ref 0–1.2)
BODY TEMPERATURE: 37 DEGREES CELSIUS
BUN SERPL-MCNC: 57 MG/DL (ref 6–23)
CALCIUM SERPL-MCNC: 7.9 MG/DL (ref 8.6–10.3)
CHLORIDE SERPL-SCNC: 109 MMOL/L (ref 98–107)
CO2 SERPL-SCNC: 26 MMOL/L (ref 21–32)
CREAT SERPL-MCNC: 5.4 MG/DL (ref 0.5–1.05)
EGFRCR SERPLBLD CKD-EPI 2021: 9 ML/MIN/1.73M*2
ERYTHROCYTE [DISTWIDTH] IN BLOOD BY AUTOMATED COUNT: 13.1 % (ref 11.5–14.5)
GLUCOSE BLD MANUAL STRIP-MCNC: 118 MG/DL (ref 74–99)
GLUCOSE BLD MANUAL STRIP-MCNC: 125 MG/DL (ref 74–99)
GLUCOSE SERPL-MCNC: 117 MG/DL (ref 74–99)
HCO3 BLDA-SCNC: 27.7 MMOL/L (ref 22–26)
HCT VFR BLD AUTO: 37.6 % (ref 36–46)
HGB BLD-MCNC: 11.9 G/DL (ref 12–16)
INHALED O2 CONCENTRATION: 28 %
MCH RBC QN AUTO: 29.4 PG (ref 26–34)
MCHC RBC AUTO-ENTMCNC: 31.6 G/DL (ref 32–36)
MCV RBC AUTO: 93 FL (ref 80–100)
NRBC BLD-RTO: 0 /100 WBCS (ref 0–0)
OXYHGB MFR BLDA: 95.7 % (ref 94–98)
PCO2 BLDA: 39 MM HG (ref 38–42)
PEEP CMH2O: 5 CM H2O
PH BLDA: 7.46 PH (ref 7.38–7.42)
PLATELET # BLD AUTO: 139 X10*3/UL (ref 150–450)
PO2 BLDA: 78 MM HG (ref 85–95)
POTASSIUM SERPL-SCNC: 3.7 MMOL/L (ref 3.5–5.3)
PRESSURE SUPPORT: 10 CM H2O
PROT SERPL-MCNC: 5.3 G/DL (ref 6.4–8.2)
RBC # BLD AUTO: 4.05 X10*6/UL (ref 4–5.2)
SAO2 % BLDA: 98 % (ref 94–100)
SODIUM SERPL-SCNC: 145 MMOL/L (ref 136–145)
VENTILATOR MODE: ABNORMAL
WBC # BLD AUTO: 10.5 X10*3/UL (ref 4.4–11.3)

## 2024-08-26 PROCEDURE — 36415 COLL VENOUS BLD VENIPUNCTURE: CPT | Performed by: INTERNAL MEDICINE

## 2024-08-26 PROCEDURE — 94003 VENT MGMT INPAT SUBQ DAY: CPT

## 2024-08-26 PROCEDURE — 99291 CRITICAL CARE FIRST HOUR: CPT | Performed by: STUDENT IN AN ORGANIZED HEALTH CARE EDUCATION/TRAINING PROGRAM

## 2024-08-26 PROCEDURE — 31720 CLEARANCE OF AIRWAYS: CPT

## 2024-08-26 PROCEDURE — 84075 ASSAY ALKALINE PHOSPHATASE: CPT | Performed by: INTERNAL MEDICINE

## 2024-08-26 PROCEDURE — 2500000001 HC RX 250 WO HCPCS SELF ADMINISTERED DRUGS (ALT 637 FOR MEDICARE OP): Performed by: INTERNAL MEDICINE

## 2024-08-26 PROCEDURE — 2020000001 HC ICU ROOM DAILY

## 2024-08-26 PROCEDURE — 99291 CRITICAL CARE FIRST HOUR: CPT | Performed by: INTERNAL MEDICINE

## 2024-08-26 PROCEDURE — 9420000001 HC RT PATIENT EDUCATION 5 MIN

## 2024-08-26 PROCEDURE — 82947 ASSAY GLUCOSE BLOOD QUANT: CPT

## 2024-08-26 PROCEDURE — 82805 BLOOD GASES W/O2 SATURATION: CPT | Performed by: INTERNAL MEDICINE

## 2024-08-26 PROCEDURE — 36600 WITHDRAWAL OF ARTERIAL BLOOD: CPT

## 2024-08-26 PROCEDURE — 85027 COMPLETE CBC AUTOMATED: CPT | Performed by: INTERNAL MEDICINE

## 2024-08-26 PROCEDURE — 94681 O2 UPTK CO2 OUTP % O2 XTRC: CPT

## 2024-08-26 PROCEDURE — 94762 N-INVAS EAR/PLS OXIMTRY CONT: CPT

## 2024-08-26 PROCEDURE — 2500000005 HC RX 250 GENERAL PHARMACY W/O HCPCS: Performed by: INTERNAL MEDICINE

## 2024-08-26 PROCEDURE — 2500000004 HC RX 250 GENERAL PHARMACY W/ HCPCS (ALT 636 FOR OP/ED): Performed by: INTERNAL MEDICINE

## 2024-08-26 ASSESSMENT — PAIN - FUNCTIONAL ASSESSMENT
PAIN_FUNCTIONAL_ASSESSMENT: CPOT (CRITICAL CARE PAIN OBSERVATION TOOL)

## 2024-08-26 NOTE — PROGRESS NOTES
Fidelia Santiago is a 49 y.o. female on day 4 of admission presenting with No Principal Problem: There is no principal problem currently on the Problem List. Please update the Problem List and refresh..      Subjective   Patient seen and examined this morning.  Not responding this morning     Objective     Last Recorded Vitals  BP (!) 165/102 (BP Location: Right arm, Patient Position: Lying)   Pulse 78   Temp 36.4 °C (97.5 °F) (Temporal)   Resp 15   Wt 91.4 kg (201 lb 8 oz)   SpO2 97%   Intake/Output last 3 Shifts:    Intake/Output Summary (Last 24 hours) at 8/26/2024 1458  Last data filed at 8/26/2024 1230  Gross per 24 hour   Intake 2953.67 ml   Output 1668 ml   Net 1285.67 ml       Admission Weight  Weight: 79.4 kg (175 lb) (08/21/24 2007)    Daily Weight  08/25/24 : 91.4 kg (201 lb 8 oz)    Image Results  XR abdomen 1 view  Narrative: Interpreted By:  Itz Dumont,   STUDY:  XR ABDOMEN 1 VIEW;  8/24/2024 7:47 pm      INDICATION:  Signs/Symptoms:OG tube placement.      COMPARISON:  08/21/2024      ACCESSION NUMBER(S):  [id number]      ORDERING CLINICIAN:  SHOAIB JUDGE      FINDINGS:  NG/OG tube is coiled over the gastric body.      To the extent visualized, there are gas distended bowel loops the  appear in a nonobstructive pattern.      There are right upper quadrant cholecystectomy clips.      There is no evidence of free intraperitoneal air.      No pneumatosis or portal venous gas identified.      No acute osseous abnormalities.      The lung bases are clear.      Impression: NG/OG tube is coiled over the gastric body.          MACRO:  None.      Signed by: Itz Dumont 8/24/2024 8:19 PM  Dictation workstation:   [id number]      Physical Exam  Constitutional:       Appearance: Normal appearance.      Comments: Withdraws from pain, intubated on spontaneous respiration   HENT:      Head: Normocephalic.      Right Ear: Tympanic membrane normal.      Nose: Nose normal.      Mouth/Throat:       Mouth: Mucous membranes are moist.   Eyes:      Pupils: Pupils are equal, round, and reactive to light.   Cardiovascular:      Rate and Rhythm: Normal rate and regular rhythm.   Pulmonary:      Effort: Pulmonary effort is normal.      Comments: On Spontaneous ventilation  Abdominal:      General: Bowel sounds are normal.      Palpations: Abdomen is soft.   Musculoskeletal:      Cervical back: Normal range of motion.   Neurological:      Mental Status: She is alert.      Comments: Unable to assess  Decerebrate posturing        Relevant Results               Assessment/Plan   This patient currently has cardiac telemetry ordered; if you would like to modify or discontinue the telemetry order, click here to go to the orders activity to modify/discontinue the order.      This patient has a urinary catheter   Reason for the urinary catheter remaining today? critically ill patient who need accurate urinary output measurements    This patient is intubated   Reason for patient to remain intubated today? they are unable to protect their airway        Assessment & Plan    49-year-old obese female with a past medical history of bipolar disorder, dermatofibroma of the left arm, fibromyalgia, chronic pain, nonalcoholic fatty liver disease, and vitamin D deficiency who was brought by the EMS to the emergency room after being found unresponsive by her boyfriend. EMS noted that boyfriend was very aggressive on scene and placed into police custody. In the ER, patient was found to have a sepsis [leukocytosis, hypothermia, lactic acidosis] associated with acute kidney injury, hypercapnic respiratory failure with respiratory acidosis, hypocalcemia, transaminitis, rhabdomyolysis, and elevated troponin. All in the setting of an underlying possible aspiration pneumonia of gram-negative bacteria origin.     Assessment  Acute metabolic encephalopathy  Acute hypoxic aspiratory failure requiring intubation  Aspiration pneumonia  Drug  overdose  NSTEMI type II likely secondary demand ischemia  Acute renal failure  Anuria  Rhabdomyolysis  Hypokalemia  Hypocalcemia  Shock liver  Leukocytosis    Plan  Patient not responding much  There is no decerebrate posturing  She started on Keppra yesterday  EEG ordered today   Patient continues to be intubated, on spontaneous respiration  Vent per ICU  Continue current ongoing care  Continue IV fluids for rhabdomyolysis  Creatinine is going up   Urine output is improved  Continue tube feeds  Continue to monitor creatinine and urine output  Abx has been stopped   Continue breathing treatments  Continue IV fluids  Monitor for hypoglycemia   Family meeting yetserday       DVT prophylaxis: Lovenox  GI prophylaxis: Protonix  Full code                    Junaid Rosenberg MD

## 2024-08-26 NOTE — PROGRESS NOTES
Fidelia Santiago is a 49 y.o. female on day 4 of admission presenting with No Principal Problem: There is no principal problem currently on the Problem List. Please update the Problem List and refresh..      Subjective   Patient seen and examined at the bedside this morning  Good urine output  Really no other major changes clinically       Objective          Vitals 24HR  Heart Rate:  []   Temp:  [35.9 °C (96.6 °F)-36.5 °C (97.7 °F)]   Resp:  [13-31]   BP: (130-172)/()   Weight:  [91.4 kg (201 lb 8 oz)]   SpO2:  [91 %-98 %]         Intake/Output last 3 Shifts:    Intake/Output Summary (Last 24 hours) at 8/26/2024 1156  Last data filed at 8/26/2024 1019  Gross per 24 hour   Intake 2748.67 ml   Output 1318 ml   Net 1430.67 ml       Physical Exam  Constitutional:       Comments: Responds only to noxious stimuli   HENT:      Head: Normocephalic and atraumatic.      Right Ear: External ear normal.      Left Ear: External ear normal.      Nose: Nose normal.      Mouth/Throat:      Mouth: Mucous membranes are moist.      Pharynx: Oropharynx is clear.      Comments: Endotracheal and feeding tube in place  Eyes:      Comments: She does just barely open eyes today and moves her eyes a little bit to the side  Pupils dilated   Cardiovascular:      Rate and Rhythm: Regular rhythm. Tachycardia present.   Pulmonary:      Effort: Pulmonary effort is normal.      Breath sounds: Normal breath sounds.   Abdominal:      General: Abdomen is flat.      Palpations: Abdomen is soft.   Genitourinary:     Comments: Garcia catheter in place  Skin:     General: Skin is warm and dry.   Neurological:      Comments: Positive Babinski  Does respond to noxious stimuli with decerebrate posturing of the right arm  Rotates head to the right arm as well  Not moving left at all even to noxious stimuli  Positive gag reflex  No eye response to stimuli       Scheduled medications  bisacodyl, 10 mg, rectal, Daily  heparin (porcine), 5,000 Units,  subcutaneous, q8h CAMI  levETIRAcetam, 500 mg, intravenous, q12h  oxygen, , inhalation, Continuous - Inhalation  pantoprazole, 40 mg, intravenous, q24h  perflutren protein A microsphere, 0.5 mL, intravenous, Once in imaging  sulfur hexafluoride microsphr, 2 mL, intravenous, Once in imaging      Continuous medications  sodium chloride 0.9%, 100 mL/hr, Last Rate: 100 mL/hr (08/26/24 3054)      PRN medications  PRN medications: acetaminophen **OR** acetaminophen **OR** acetaminophen, magnesium hydroxide, ondansetron ODT **OR** ondansetron    Relevant Results               Assessment/Plan   This patient currently has cardiac telemetry ordered; if you would like to modify or discontinue the telemetry order, click here to go to the orders activity to modify/discontinue the order.          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  Rhabdomyolysis  Acute respiratory failure  Bilateral infiltrates with possible aspiration  Comatose state  Elevated troponin: Improving  Drug overdose with unknown downtime  Anoxic brain injury  Systolic congestive heart failure biventricular    Plan:  An EEG has been ordered and I have discussed with the pulmonary team  Continue Keppra  We will discontinue normal saline  Continue tube feeds and free water flushes as she is on currently  Continue ventilatory support she is currently on pressure support ABG is improving  May get MRI soon  We will get as much information as we can to discuss with family  Continue supportive measures otherwise as she is on  I spent 30 minutes of critical care time directly involved in patient care excluding billable procedures          Tomás Randall, DO

## 2024-08-26 NOTE — PROGRESS NOTES
Pt reviewed during Care Rounds today. Pt continues on the vent and is not making progress. Next of Kin have been identified as pt's father and pt's daughter.  They have been visiting and are aware of pt's condition and guarded prognosis. Care Transitions will continue to follow.     ERON Summers

## 2024-08-26 NOTE — CARE PLAN
The clinical goals for the shift include Pt will maintain U/O greater than 30 mL/hour, RR less than 25, and no fever throughout the shift    Following goals not met due to Pt's inability to communicate and RN unable to validate Pt's understanding. Blood pressure elevated, provider made aware.     Problem: Fall/Injury  Goal: Verbalize understanding of personal risk factors for fall in the hospital  Outcome: Not Progressing  Goal: Verbalize understanding of risk factor reduction measures to prevent injury from fall in the home  Outcome: Not Progressing  Goal: Use assistive devices by end of the shift  Outcome: Not Progressing  Goal: Pace activities to prevent fatigue by end of the shift  Outcome: Not Progressing     Problem: Respiratory  Goal: Verbalize decreased shortness of breath this shift  Outcome: Not Progressing

## 2024-08-26 NOTE — PROGRESS NOTES
Subjective Data:  Patient remains unresponsive at this point. Hemodynamically stable.     Overnight Events:    No     Objective Data:  Last Recorded Vitals:  Vitals:    08/26/24 0700 08/26/24 0705 08/26/24 0840 08/26/24 1026   BP: (!) 149/95      BP Location: Left arm      Patient Position: Lying      Pulse: 68      Resp: 16      Temp:  35.9 °C (96.6 °F)     TempSrc:  Temporal     SpO2:  98% 97% 94%   Weight:       Height:           Last Labs:  CBC - 8/26/2024:  4:33 AM  10.5 11.9 139    37.6      CMP - 8/26/2024:  4:33 AM  7.9 5.3 33 --- 0.4   4.9 3.0 186 96      PTT - No results in last year.  _   _ _     TROPHS   Date/Time Value Ref Range Status   08/24/2024 04:39  0 - 13 ng/L Final   08/23/2024 03:57 AM 1,624 0 - 13 ng/L Final   08/21/2024 09:44  0 - 13 ng/L Final     Comment:     Previous result verified on 8/21/2024 2106 on specimen/case [id number] called with component [facility] for procedure Troponin I, High Sensitivity with value 670 ng/L.     HGBA1C   Date/Time Value Ref Range Status   12/15/2020 09:58 AM 5.1 4.0 - 5.6 % Final      Last I/O:  I/O last 3 completed shifts:  In: 4312.7 (47.2 mL/kg) [I.V.:2386.7 (26.1 mL/kg); NG/GT:1726; IV Piggyback:200]  Out: 1953 (21.4 mL/kg) [Urine:1953 (0.6 mL/kg/hr)]  Weight: 91.4 kg     Past Cardiology Tests (Last 3 Years):  EKG:  ECG 12 lead 08/21/2024    Echo:  Transthoracic Echo (TTE) Limited 08/22/2024    Ejection Fractions:  EF   Date/Time Value Ref Range Status   08/22/2024 06:00 AM 40 %      Cath:  No results found for this or any previous visit from the past 1095 days.    Stress Test:  No results found for this or any previous visit from the past 1095 days.    Cardiac Imaging:  XR chest abdomen for OG NG placement 08/21/2024      Inpatient Medications:  Scheduled medications   Medication Dose Route Frequency    bisacodyl  10 mg rectal Daily    heparin (porcine)  5,000 Units subcutaneous q8h CAMI    levETIRAcetam  500 mg intravenous q12h    oxygen    inhalation Continuous - Inhalation    pantoprazole  40 mg intravenous q24h    perflutren protein A microsphere  0.5 mL intravenous Once in imaging    sulfur hexafluoride microsphr  2 mL intravenous Once in imaging     PRN medications   Medication    acetaminophen    Or    acetaminophen    Or    acetaminophen    magnesium hydroxide    ondansetron ODT    Or    ondansetron     Continuous Medications   Medication Dose Last Rate    sodium chloride 0.9%  100 mL/hr 100 mL/hr (08/26/24 0966)       Physical Exam:  General: Unconscious, intubated  Neck: supple; trachea midline; no masses; no JVD; intubated  Chest: clear breath sounds bilaterally; no wheezing; on Mechanical ventilation  Cardio: regular rhythm, S1S2 normal, no murmurs  Abdomen: Soft, non-tender, non-distension, no organomegaly  Extremities: no clubbing/cyanosis/edema     Assessment/Plan     Mrs. Fidelia Santiago is a 49 y.o. non-smoker female being consulted by the Cardiology team for troponin leak. Patient with prior medical history significant for bipolar disorder, dermatofibroma of the left arm, fibromyalgia, chronic pain, nonalcoholic fatty liver disease, and vitamin D deficiency. Patient intubated and cannot give reliable information at this point. History has been obtained from previous records. Per chart review,   she was brought by the EMS to the emergency room after being found unresponsive by her boyfriend. EMS noted that boyfriend was very aggressive on scene and placed into police custody. She was found unresponsive and emergently intubated by the EMS in the field. On presentation to the ER, blood pressure 118/101, respiratory rate 30, temperature 34.3, heart rate 92. Pertinent findings on blood workup; WBC 13,000, troponin 670 --> 885, potassium 7.4, creatinine 2.94, calcium 7.3, alkaline phosphatase 216, ALT 1750, AST 2674, creatinine kinase 1830 and lactic acid 2.9 ABG showed a pH of 7.10, pCO2 of 63 and pO2 of 80. U tox screen came back  negative. Urinalysis came back grossly within normal limits. CT scan of the chest abdomen and pelvis showed bilateral lower lobe infiltrates compatible with pneumonia presumably aspiration. There is also bilateral perinephric stranding. And constipation. EMS gave the patient on the way 4 doses of Narcan without improvement in consciousness. Patient was given in the emergency room IV fluids, ceftriaxone, azithromycin, DuoNebs, insulin, sodium bicarbonate. Patient was found to have a sepsis [leukocytosis, hypothermia, lactic acidosis] associated with acute kidney injury, hypercapnic respiratory failure with respiratory acidosis, hypocalcemia, transaminitis, rhabdomyolysis, and elevated troponin. All in the setting of an underlying possible aspiration pneumonia of gram-negative bacteria origin. She has been admitted for clinical compensation.     Assessment     # Troponin Elevation  - Trop 670 --> 885 --> 1,624 --> 713.  - Troponin elevation is likely attributable to non-ischemic myocardial injury due to myocardial imbalance in oxygen supply/demand secondary to underlying rhabdomyolysis.   - Would suggest to continue current medical management per primary team.    - Hydration.  - Echo (08/22/2024):   1. The left ventricular systolic function is moderately decreased, with a visually estimated ejection fraction of 40%.   2. There is global hypokinesis of the left ventricle with minor regional variations.   3. Spectral Doppler shows a pseudonormal pattern of left ventricular diastolic filling.   4. There is reduced right ventricular systolic function  - Once patient is more stable, we can discuss options to rule out cardiac ischemia.     # Rhabdomyolysis / HERI  - Crea 3.4 --> 5.8  - Ph 7.15 --> 7.5  - ALT 1376 --> 296  - AST 1494 --> 64  - Lactate 2.4  - Would suggest to capitalize on hydration.  - Nephrology recs.     # Aspirative Pneumonia  - Would suggest to keep broad spectrum antibiotics.  - Would suggest to titrate  hydration according to clinical status.       This critically ill patient continues to be at-risk for clinically significant deterioration / failure due to the above mentioned dysfunctional, unstable organ systems.  I have personally identified and managed all complex critical care issues to prevent aforementioned clinical deterioration.  Critical care time is spent at bedside and/or the immediate area and has included, but is not limited to, the review of diagnostic tests, labs, radiographs, serial assessments of hemodynamics, respiratory status, ventilatory management, and family updates.  Time spent in procedures and teaching are reported separately.     Critical care time: 50 minutes     Peripheral IV 08/21/24 20 G Left Antecubital (Active)   Site Assessment Clean;Dry;Intact 08/26/24 0800   Dressing Type Transparent 08/26/24 0800   Line Status Infusing 08/26/24 0800   Dressing Status Clean;Dry 08/26/24 0800   Number of days: 5       Peripheral IV 08/21/24 16 G Right Antecubital (Active)   Site Assessment Clean;Dry;Intact 08/26/24 0800   Dressing Type Transparent 08/26/24 0800   Line Status Blood return noted;Flushed 08/26/24 0800   Dressing Status Clean;Dry 08/26/24 0800   Number of days: 5       ETT  6.5 mm (Active)   Secured at (cm) 22 cm 08/26/24 1026   Measured from Lips 08/26/24 1026   Secured Location Center 08/26/24 1026   Secured by Commercial tube felipe 08/26/24 1026   Site Condition Dry 08/26/24 1026   Number of days: 5       NG/OG/Feeding Tube OG - [place] sump 14 Fr Center mouth (Active)   Site Assessment Clean;Dry;Intact 08/26/24 0800   Number of days: 5       Urethral Catheter Straight-tip 16 Fr. (Active)   Output (mL) 105 mL 08/26/24 0745   Number of days: 5       Code Status:  Full Code    Jason Berman MD  Cardiology

## 2024-08-27 ENCOUNTER — HOSPITAL ENCOUNTER (INPATIENT)
Dept: NEUROLOGY | Facility: HOSPITAL | Age: 49
Discharge: HOME | End: 2024-08-27
Payer: COMMERCIAL

## 2024-08-27 LAB
ALBUMIN SERPL BCP-MCNC: 3 G/DL (ref 3.4–5)
ALP SERPL-CCNC: 88 U/L (ref 33–110)
ALT SERPL W P-5'-P-CCNC: 121 U/L (ref 7–45)
ANION GAP SERPL CALC-SCNC: 14 MMOL/L (ref 10–20)
AST SERPL W P-5'-P-CCNC: 29 U/L (ref 9–39)
BASE EXCESS BLDA CALC-SCNC: 3.4 MMOL/L (ref -2–3)
BILIRUB SERPL-MCNC: 0.4 MG/DL (ref 0–1.2)
BODY TEMPERATURE: 37 DEGREES CELSIUS
BUN SERPL-MCNC: 57 MG/DL (ref 6–23)
CALCIUM SERPL-MCNC: 8.2 MG/DL (ref 8.6–10.3)
CARDIAC TROPONIN I PNL SERPL HS: 87 NG/L (ref 0–13)
CHLORIDE SERPL-SCNC: 112 MMOL/L (ref 98–107)
CK SERPL-CCNC: 92 U/L (ref 0–215)
CO2 SERPL-SCNC: 24 MMOL/L (ref 21–32)
CREAT SERPL-MCNC: 4.72 MG/DL (ref 0.5–1.05)
EGFRCR SERPLBLD CKD-EPI 2021: 11 ML/MIN/1.73M*2
ERYTHROCYTE [DISTWIDTH] IN BLOOD BY AUTOMATED COUNT: 13.2 % (ref 11.5–14.5)
GLUCOSE BLD MANUAL STRIP-MCNC: 105 MG/DL (ref 74–99)
GLUCOSE BLD MANUAL STRIP-MCNC: 121 MG/DL (ref 74–99)
GLUCOSE BLD MANUAL STRIP-MCNC: 123 MG/DL (ref 74–99)
GLUCOSE SERPL-MCNC: 118 MG/DL (ref 74–99)
HCO3 BLDA-SCNC: 26.8 MMOL/L (ref 22–26)
HCT VFR BLD AUTO: 38.6 % (ref 36–46)
HGB BLD-MCNC: 12.1 G/DL (ref 12–16)
INHALED O2 CONCENTRATION: 28 %
MAGNESIUM SERPL-MCNC: 1.98 MG/DL (ref 1.6–2.4)
MCH RBC QN AUTO: 29.4 PG (ref 26–34)
MCHC RBC AUTO-ENTMCNC: 31.3 G/DL (ref 32–36)
MCV RBC AUTO: 94 FL (ref 80–100)
NRBC BLD-RTO: 0 /100 WBCS (ref 0–0)
OXYHGB MFR BLDA: 95.5 % (ref 94–98)
PCO2 BLDA: 36 MM HG (ref 38–42)
PEEP CMH2O: 5 CM H2O
PH BLDA: 7.48 PH (ref 7.38–7.42)
PLATELET # BLD AUTO: 146 X10*3/UL (ref 150–450)
PO2 BLDA: 74 MM HG (ref 85–95)
POTASSIUM SERPL-SCNC: 4.7 MMOL/L (ref 3.5–5.3)
PRESSURE SUPPORT: 10 CM H2O
PROT SERPL-MCNC: 5.5 G/DL (ref 6.4–8.2)
RBC # BLD AUTO: 4.11 X10*6/UL (ref 4–5.2)
SAO2 % BLDA: 97 % (ref 94–100)
SODIUM SERPL-SCNC: 145 MMOL/L (ref 136–145)
WBC # BLD AUTO: 10.7 X10*3/UL (ref 4.4–11.3)

## 2024-08-27 PROCEDURE — 2500000004 HC RX 250 GENERAL PHARMACY W/ HCPCS (ALT 636 FOR OP/ED): Performed by: INTERNAL MEDICINE

## 2024-08-27 PROCEDURE — 94003 VENT MGMT INPAT SUBQ DAY: CPT

## 2024-08-27 PROCEDURE — 99291 CRITICAL CARE FIRST HOUR: CPT | Performed by: STUDENT IN AN ORGANIZED HEALTH CARE EDUCATION/TRAINING PROGRAM

## 2024-08-27 PROCEDURE — 2500000005 HC RX 250 GENERAL PHARMACY W/O HCPCS: Performed by: INTERNAL MEDICINE

## 2024-08-27 PROCEDURE — 84484 ASSAY OF TROPONIN QUANT: CPT | Performed by: INTERNAL MEDICINE

## 2024-08-27 PROCEDURE — 85027 COMPLETE CBC AUTOMATED: CPT | Performed by: INTERNAL MEDICINE

## 2024-08-27 PROCEDURE — 80053 COMPREHEN METABOLIC PANEL: CPT | Performed by: INTERNAL MEDICINE

## 2024-08-27 PROCEDURE — 36600 WITHDRAWAL OF ARTERIAL BLOOD: CPT

## 2024-08-27 PROCEDURE — 82947 ASSAY GLUCOSE BLOOD QUANT: CPT

## 2024-08-27 PROCEDURE — 95819 EEG AWAKE AND ASLEEP: CPT | Performed by: PSYCHIATRY & NEUROLOGY

## 2024-08-27 PROCEDURE — 94762 N-INVAS EAR/PLS OXIMTRY CONT: CPT

## 2024-08-27 PROCEDURE — 99291 CRITICAL CARE FIRST HOUR: CPT | Performed by: INTERNAL MEDICINE

## 2024-08-27 PROCEDURE — 82805 BLOOD GASES W/O2 SATURATION: CPT | Performed by: INTERNAL MEDICINE

## 2024-08-27 PROCEDURE — 94681 O2 UPTK CO2 OUTP % O2 XTRC: CPT

## 2024-08-27 PROCEDURE — 82550 ASSAY OF CK (CPK): CPT | Performed by: INTERNAL MEDICINE

## 2024-08-27 PROCEDURE — 83735 ASSAY OF MAGNESIUM: CPT | Performed by: INTERNAL MEDICINE

## 2024-08-27 PROCEDURE — 95819 EEG AWAKE AND ASLEEP: CPT

## 2024-08-27 PROCEDURE — 2020000001 HC ICU ROOM DAILY

## 2024-08-27 PROCEDURE — 31720 CLEARANCE OF AIRWAYS: CPT

## 2024-08-27 PROCEDURE — 36415 COLL VENOUS BLD VENIPUNCTURE: CPT | Performed by: INTERNAL MEDICINE

## 2024-08-27 ASSESSMENT — PAIN - FUNCTIONAL ASSESSMENT
PAIN_FUNCTIONAL_ASSESSMENT: CPOT (CRITICAL CARE PAIN OBSERVATION TOOL)

## 2024-08-27 NOTE — CARE PLAN
Problem: Safety - Adult  Goal: Free from fall injury  Outcome: Met     Problem: Mechanical Ventilation  Goal: Patient Will Maintain Patent Airway  Outcome: Met  Goal: ET tube will be managed safely  Outcome: Met     Problem: Skin  Goal: Decreased wound size/increased tissue granulation at next dressing change  Flowsheets (Taken 8/27/2024 0531)  Decreased wound size/increased tissue granulation at next dressing change: Protective dressings over bony prominences  Goal: Participates in plan/prevention/treatment measures  Flowsheets (Taken 8/27/2024 0531)  Participates in plan/prevention/treatment measures: Elevate heels  Goal: Prevent/manage excess moisture  Flowsheets (Taken 8/27/2024 0531)  Prevent/manage excess moisture: Cleanse incontinence/protect with barrier cream  Goal: Prevent/minimize sheer/friction injuries  Flowsheets (Taken 8/27/2024 0531)  Prevent/minimize sheer/friction injuries: HOB 30 degrees or less  Goal: Promote/optimize nutrition  Flowsheets (Taken 8/27/2024 0531)  Promote/optimize nutrition: Monitor/record intake including meals  Goal: Promote skin healing  Flowsheets (Taken 8/27/2024 0531)  Promote skin healing: Turn/reposition every 2 hours/use positioning/transfer devices   The patient's goals for the shift include      The clinical goals for the shift include will maintain >30 ml/hr of urine output throughout shift    Goal met

## 2024-08-27 NOTE — PROGRESS NOTES
Subjective Data:  Patient remains unresponsive at this point. Hemodynamically stable.     Overnight Events:    No     Objective Data:  Last Recorded Vitals:  Vitals:    08/27/24 0552 08/27/24 0620 08/27/24 0630 08/27/24 0716   BP:   (!) 172/102    BP Location:       Patient Position:       Pulse:   86    Resp:  26 26    Temp:    36.9 °C (98.4 °F)   TempSrc:    Temporal   SpO2:  96%  96%   Weight: 95.3 kg (210 lb 1.6 oz)      Height:           Last Labs:  CBC - 8/27/2024:  5:08 AM  10.7 12.1 146    38.6      CMP - 8/27/2024:  5:08 AM  8.2 5.5 29 --- 0.4   4.9 3.0 121 88      PTT - No results in last year.  _   _ _     TROPHS   Date/Time Value Ref Range Status   08/27/2024 05:08 AM 87 0 - 13 ng/L Final   08/24/2024 04:39  0 - 13 ng/L Final   08/23/2024 03:57 AM 1,624 0 - 13 ng/L Final     HGBA1C   Date/Time Value Ref Range Status   12/15/2020 09:58 AM 5.1 4.0 - 5.6 % Final      Last I/O:  I/O last 3 completed shifts:  In: 4878.7 (51.2 mL/kg) [I.V.:1871.7 (19.6 mL/kg); NG/GT:2707; IV Piggyback:300]  Out: 3218 (33.8 mL/kg) [Urine:3218 (0.9 mL/kg/hr)]  Weight: 95.3 kg     Past Cardiology Tests (Last 3 Years):  EKG:  ECG 12 lead 08/21/2024    Echo:  Transthoracic Echo (TTE) Limited 08/22/2024    Ejection Fractions:  EF   Date/Time Value Ref Range Status   08/22/2024 06:00 AM 40 %      Cath:  No results found for this or any previous visit from the past 1095 days.    Stress Test:  No results found for this or any previous visit from the past 1095 days.    Cardiac Imaging:  XR chest abdomen for OG NG placement 08/21/2024      Inpatient Medications:  Scheduled medications   Medication Dose Route Frequency    bisacodyl  10 mg rectal Daily    heparin (porcine)  5,000 Units subcutaneous q8h CAMI    levETIRAcetam  500 mg intravenous q12h    oxygen   inhalation Continuous - Inhalation    pantoprazole  40 mg intravenous q24h    perflutren protein A microsphere  0.5 mL intravenous Once in imaging    sulfur hexafluoride  microsphr  2 mL intravenous Once in imaging     PRN medications   Medication    acetaminophen    Or    acetaminophen    Or    acetaminophen    magnesium hydroxide    ondansetron ODT    Or    ondansetron     Continuous Medications   Medication Dose Last Rate       Physical Exam:  General: Unconscious, intubated  Neck: supple; trachea midline; no masses; no JVD; intubated  Chest: clear breath sounds bilaterally; no wheezing; on Mechanical ventilation  Cardio: regular rhythm, S1S2 normal, no murmurs  Abdomen: Soft, non-tender, non-distension, no organomegaly  Extremities: no clubbing/cyanosis/edema     Assessment/Plan     Mrs. Fidelia Santiago is a 49 y.o. non-smoker female being consulted by the Cardiology team for troponin leak. Patient with prior medical history significant for bipolar disorder, dermatofibroma of the left arm, fibromyalgia, chronic pain, nonalcoholic fatty liver disease, and vitamin D deficiency. Patient intubated and cannot give reliable information at this point. History has been obtained from previous records. Per chart review,   she was brought by the EMS to the emergency room after being found unresponsive by her boyfriend. EMS noted that boyfriend was very aggressive on scene and placed into police custody. She was found unresponsive and emergently intubated by the EMS in the field. On presentation to the ER, blood pressure 118/101, respiratory rate 30, temperature 34.3, heart rate 92. Pertinent findings on blood workup; WBC 13,000, troponin 670 --> 885, potassium 7.4, creatinine 2.94, calcium 7.3, alkaline phosphatase 216, ALT 1750, AST 2674, creatinine kinase 1830 and lactic acid 2.9 ABG showed a pH of 7.10, pCO2 of 63 and pO2 of 80. U tox screen came back negative. Urinalysis came back grossly within normal limits. CT scan of the chest abdomen and pelvis showed bilateral lower lobe infiltrates compatible with pneumonia presumably aspiration. There is also bilateral perinephric stranding.  And constipation. EMS gave the patient on the way 4 doses of Narcan without improvement in consciousness. Patient was given in the emergency room IV fluids, ceftriaxone, azithromycin, DuoNebs, insulin, sodium bicarbonate. Patient was found to have a sepsis [leukocytosis, hypothermia, lactic acidosis] associated with acute kidney injury, hypercapnic respiratory failure with respiratory acidosis, hypocalcemia, transaminitis, rhabdomyolysis, and elevated troponin. All in the setting of an underlying possible aspiration pneumonia of gram-negative bacteria origin. She has been admitted for clinical compensation.     Assessment     # Troponin Elevation  - Trop 670 --> 885 --> 1,624 --> 713.  - Troponin elevation is likely attributable to non-ischemic myocardial injury due to myocardial imbalance in oxygen supply/demand secondary to underlying rhabdomyolysis.   - Would suggest to continue current medical management per primary team.    - Hydration.  - Echo (08/22/2024):   1. The left ventricular systolic function is moderately decreased, with a visually estimated ejection fraction of 40%.   2. There is global hypokinesis of the left ventricle with minor regional variations.   3. Spectral Doppler shows a pseudonormal pattern of left ventricular diastolic filling.   4. There is reduced right ventricular systolic function  - Once patient is more stable, we can discuss options to rule out cardiac ischemia.     # Rhabdomyolysis / HERI  - Crea 3.4 --> 5.8  - Ph 7.15 --> 7.5  - ALT 1376 --> 296  - AST 1494 --> 64  - Lactate 2.4  - Would suggest to capitalize on hydration.  - Nephrology recs.     # Aspirative Pneumonia  - Would suggest to keep broad spectrum antibiotics.  - Would suggest to titrate hydration according to clinical status.       This critically ill patient continues to be at-risk for clinically significant deterioration / failure due to the above mentioned dysfunctional, unstable organ systems.  I have personally  identified and managed all complex critical care issues to prevent aforementioned clinical deterioration.  Critical care time is spent at bedside and/or the immediate area and has included, but is not limited to, the review of diagnostic tests, labs, radiographs, serial assessments of hemodynamics, respiratory status, ventilatory management, and family updates.  Time spent in procedures and teaching are reported separately.     Critical care time: 50 minutes     Peripheral IV 08/21/24 20 G Left Antecubital (Active)   Site Assessment Clean;Dry;Intact 08/27/24 0600   Dressing Type Transparent 08/27/24 0600   Line Status Infusing 08/27/24 0600   Dressing Status Clean 08/27/24 0600   Number of days: 6       Peripheral IV 08/21/24 16 G Right Antecubital (Active)   Site Assessment Clean;Dry;Intact 08/27/24 0600   Dressing Type Transparent 08/27/24 0600   Line Status Saline locked 08/27/24 0600   Dressing Status Clean;Dry 08/27/24 0600   Number of days: 6       ETT  6.5 mm (Active)   Secured at (cm) 22 cm 08/27/24 0620   Measured from Lips 08/27/24 0620   Secured Location Center 08/27/24 0620   Secured by Commercial tube felipe 08/27/24 0620   Site Condition Dry 08/27/24 0620   Number of days: 6       NG/OG/Feeding Tube OG - [place] sump 14 Fr Center mouth (Active)   Intake (mL) 242 mL 08/27/24 0552   Intake - Flush (mL) 191 mL 08/27/24 0552   Number of days: 6       Urethral Catheter Straight-tip 16 Fr. (Active)   Output (mL) 1100 mL 08/27/24 0552   Number of days: 6       Code Status:  Full Code    Jason Berman MD  Cardiology

## 2024-08-27 NOTE — PROGRESS NOTES
Fidelia Santiago is a 49 y.o. female on day 5 of admission presenting with No Principal Problem: There is no principal problem currently on the Problem List. Please update the Problem List and refresh..      Subjective   Patient seen and examined this morning.   Continues to be on vent on spontaneous respiration    Objective     Last Recorded Vitals  BP (!) 172/102   Pulse 86   Temp 37.2 °C (99 °F) (Temporal)   Resp (!) 28   Wt 95.3 kg (210 lb 1.6 oz)   SpO2 95%   Intake/Output last 3 Shifts:    Intake/Output Summary (Last 24 hours) at 8/27/2024 1749  Last data filed at 8/27/2024 1730  Gross per 24 hour   Intake 2938 ml   Output 3000 ml   Net -62 ml       Admission Weight  Weight: 79.4 kg (175 lb) (08/21/24 2007)    Daily Weight  08/27/24 : 95.3 kg (210 lb 1.6 oz)    Image Results  EEG  IMPRESSION    Impression  This EEG is indicative severe diffuse encephalopathy. No seizures are recorded.    A full report will be scanned into the patient's chart at a later time.    This report has been interpreted and electronically signed by      Physical Exam  Constitutional:       Appearance: Normal appearance.      Comments: Withdraws from pain, intubated on spontaneous respiration   HENT:      Head: Normocephalic.      Right Ear: Tympanic membrane normal.      Nose: Nose normal.      Mouth/Throat:      Mouth: Mucous membranes are moist.   Eyes:      Pupils: Pupils are equal, round, and reactive to light.   Cardiovascular:      Rate and Rhythm: Normal rate and regular rhythm.   Pulmonary:      Effort: Pulmonary effort is normal.      Comments: On Spontaneous ventilation  Abdominal:      General: Bowel sounds are normal.      Palpations: Abdomen is soft.   Musculoskeletal:      Cervical back: Normal range of motion.   Neurological:      Mental Status: She is alert.      Comments: Unable to assess  Decerebrate posturing          Relevant Results               Assessment/Plan   This patient currently has cardiac telemetry  ordered; if you would like to modify or discontinue the telemetry order, click here to go to the orders activity to modify/discontinue the order.      This patient has a urinary catheter   Reason for the urinary catheter remaining today? critically ill patient who need accurate urinary output measurements    This patient is intubated   Reason for patient to remain intubated today? they are unable to protect their airway        Assessment & Plan    49-year-old obese female with a past medical history of bipolar disorder, dermatofibroma of the left arm, fibromyalgia, chronic pain, nonalcoholic fatty liver disease, and vitamin D deficiency who was brought by the EMS to the emergency room after being found unresponsive by her boyfriend. EMS noted that boyfriend was very aggressive on scene and placed into police custody. In the ER, patient was found to have a sepsis [leukocytosis, hypothermia, lactic acidosis] associated with acute kidney injury, hypercapnic respiratory failure with respiratory acidosis, hypocalcemia, transaminitis, rhabdomyolysis, and elevated troponin. All in the setting of an underlying possible aspiration pneumonia of gram-negative bacteria origin.     Assessment  Acute metabolic encephalopathy  Acute hypoxic aspiratory failure requiring intubation  Aspiration pneumonia  Drug overdose  NSTEMI type II likely secondary demand ischemia  Acute renal failure  Anuria  Rhabdomyolysis  Hypokalemia  Hypocalcemia  Shock liver  Leukocytosis    Plan  Continue current ongoing care  Patient not responding much  There is no decerebrate posturing  Continue Keppra  Patient continues to be intubated, on spontaneous respiration  Vent per ICU  Continue current ongoing care  Urine output is improved  Continue tube feeds  Continue to monitor creatinine and urine output  Abx has been stopped   Continue breathing treatments  Continue IV fluids  Monitor for hypoglycemia   EEG done today.  Awaiting results  Family meeting  with critical care physician tomorrow      DVT prophylaxis: Lovenox  GI prophylaxis: Protonix  Full code                    Junaid Rosenberg MD       CBC Full  -  ( 25 Mar 2019 19:50 )  WBC Count : 5.81 K/uL  RBC Count : 3.24 M/uL  Hemoglobin : 9.4 g/dL  Hematocrit : 28.4 %  Platelet Count - Automated : 22 K/uL  Mean Cell Volume : 87.7 fL  Mean Cell Hemoglobin : 29.0 pg  Mean Cell Hemoglobin Concentration : 33.1 %  Auto Neutrophil # : 1.53 K/uL  Auto Lymphocyte # : 1.52 K/uL  Auto Monocyte # : 2.49 K/uL  Auto Eosinophil # : 0.02 K/uL  Auto Basophil # : 0.02 K/uL  Auto Neutrophil % : 26.3 %  Auto Lymphocyte % : 26.2 %  Auto Monocyte % : 42.9 %  Auto Eosinophil % : 0.3 %  Auto Basophil % : 0.3 %    03-25    137  |  98  |  24<H>  ----------------------------<  104<H>  4.2   |  21<L>  |  1.19    Ca    7.9<L>      25 Mar 2019 19:50  Phos  5.2     03-25  Mg     1.8     03-25    TPro  8.4<H>  /  Alb  3.2<L>  /  TBili  1.1  /  DBili  x   /  AST  148<H>  /  ALT  69<H>  /  AlkPhos  138<H>  03-25    Blood Gas Venous Comprehensive (03.25.19 @ 19:50)    Blood Gas Venous - Lactate: 1.6: Please note updated reference range. mmol/L    Blood Gas Venous - Chloride: 104 mmol/L    Blood Gas Venous - Creatinine: 1.00 mg/dL    pH, Venous: 7.40 pH    pCO2, Venous: 37 mmHg    pO2, Venous: 30 mmHg    HCO3, Venous: 22 mmol/L    Base Excess, Venous: -1.7: REFERENCE RANGE = -3 + 2 mmol/L mmol/L    Oxygen Saturation, Venous: 50.8 %    Blood Gas Venous - Sodium: 137 mmol/L    Blood Gas Venous - Potassium: 4.0 mmol/L    Blood Gas Venous - Glucose: 113    Blood Gas Venous - Hemoglobin: 9.5 g/dL    Blood Gas Venous - Hematocrit: 29.5 %    Urinalysis (03.25.19 @ 20:38)    Color: YELLOW    Urine Appearance: CLEAR    Glucose: NEGATIVE    Bilirubin: NEGATIVE    Ketone - Urine: NEGATIVE    Specific Gravity: 1.019    Blood: TRACE    pH - Urine: 6.0    Protein, Urine: 200    Urobilinogen: NORMAL    Nitrite: NEGATIVE    Leukocyte Esterase Concentration: NEGATIVE    Red Blood Cell - Urine: 11-25    White Blood Cell - Urine: 3-5    Hyaline Casts: 1+    Bacteria: NEGATIVE    Squamous Epithelial: OCC    Rapid Respiratory Viral Panel (03.25.19 @ 19:33)    Adenovirus (RapRVP): Not Detected    Influenza A (RapRVP): Not Detected    Influenza AH1 2009 (RapRVP): Not Detected    Influenza AH1 (RapRVP): Not Detected    Influenza AH3 (RapRVP): Not Detected    Influenza B (RapRVP): Not Detected    Parainfluenza 1 (RapRVP): Not Detected    Parainfluenza 2 (RapRVP): Not Detected    Parainfluenza 3 (RapRVP): Not Detected    Parainfluenza 4 (RapRVP): Not Detected    Resp Syncytial Virus (RapRVP): Not Detected    Chlamydia pneumoniae (RapRVP): Not Detected    Mycoplasma pneumoniae (RapRVP): Not Detected    Entero/Rhinovirus (RapRVP): Not Detected    hMPV (RapRVP): Not Detected    Coronavirus (229E,HKU1,NL63,OC43): Not Detected    < from: US Abdomen Limited (03.25.19 @ 21:38) >  IMPRESSION:   Normal right upper quadrant abdominal ultrasound.  Small right pleural effusion.  < end of copied text >    < from: Xray Chest 1 View- PORTABLE-Urgent (03.25.19 @ 20:47) >  IMPRESSION:   Clear lungs.  < end of copied text >    EKG: NSR

## 2024-08-27 NOTE — PROGRESS NOTES
Music Therapy Note    Fidelia Santiago was referred by Iza Camejo RN.     Therapy Session  Referral Type: New referral this admission  Visit Type: New visit  Session Start Time: 1623  Session End Time: 1633  Intervention Delivery: In-person  Conflict of Service: None  Family Present for Session: None     Pre-assessment  Unable to Assess Reason: Cognitive limitation, Physical limitation  Mood/Affect: Calm (intubated)         Treatment/Interventions  Areas of Focus: Emotional support, Coping, Normalization  Music Therapy Interventions: Assessment, Live music listening    Post-assessment  Unable to Assess Reason: Cognitive limitation, Physical limitation  Mood/Affect: Calm (intubated)  Total Session Time (min): 10 minutes    Narrative  Assessment Detail: Pt referred for music therapy services by RN. RN stating that pt's family not present today. Upon arrival of music therapist, pt intubated, no family present.  Plan: Plan to provide live instrumental music until MT assesses pt's music preferences with family.  Intervention: During intervention MT provided live instrumental, relaxing guitar music.  Evaluation: Pt's HR remained 81-85 BPM during session. No notable changed in pt's affect during session.  Follow-up: MT will follow-up with pts family members to assess music preferences as applicable.    Education Documentation  No documentation found.        Expressive Therapies Note

## 2024-08-27 NOTE — PROGRESS NOTES
Per medical team, patient continues to remain unresponsive on the ventilator. Medical team now has contact information for patient's father/Richi Santiago ([phone]) who came to visit patient over the weekend. Per Dr. Randall,  hopes to hold a family meeting tomorrow  at 1200.  phoned patient's father/Richi who responded politely, calmly, and cooperatively, and spoke with SW for an extended conversation. Patient's father/Richi gave the following history of patient's adult children: Eldest son/Angel Santiago  of a drug overdose in [place] about 7 years ago at age 24. Patient's second child Bassem (Pamela?) is approximately 30 years old and was raised in [place] by son/Bassem's paternal grandparents. Son/Bassem's father was Thierno, but patient's father does not remember Thierno's last name, and will attempt to locate same and then phone SW with updated info. Patient's next child Stephanie Santiago ([phone]) is 28 years old and was raised by/still lives with patient's father/Richi. Patient's father/Richi states that, of all patient's children, patient has been closest with dtr/Stephanie, and that patient/dtr spoke frequently on the phone. Patient's father/Richi also stated that Stephanie struggles with intense anxiety. Patient's youngest child is approximately 26 and was 'adopted out to a  in [place]' as a young child per patient's father/Richi. Family has no contact with same.  - 1525: SW left voicemail message and text message for patient's dtr/Stephanie. Patient's father had cautioned that Stephanie probably would not answer, but that father thinks patient's dtr/Stephanie will come with patient's father to the family meeting tomorrow. Per medical team, patient's prognosis continues to be guarded. Plan for patient TBD pending outcome of family meeting tomorrow at 1200. Care Transitions to follow and assist. Lesly Hidalgo, VERNA

## 2024-08-27 NOTE — PROGRESS NOTES
Fidelia Santiago is a 49 y.o. female on day 5 of admission presenting with No Principal Problem: There is no principal problem currently on the Problem List. Please update the Problem List and refresh..      Subjective   Patient seen and examined at the bedside this morning  Resting comfortably in bed  No major issues or events noted         Objective          Vitals 24HR  Heart Rate:  [70-96]   Temp:  [35.6 °C (96.1 °F)-36.9 °C (98.4 °F)]   Resp:  [15-28]   BP: (133-172)/()   Weight:  [95.3 kg (210 lb 1.6 oz)]   SpO2:  [94 %-98 %]         Intake/Output last 3 Shifts:    Intake/Output Summary (Last 24 hours) at 8/27/2024 1329  Last data filed at 8/27/2024 1300  Gross per 24 hour   Intake 2938 ml   Output 2050 ml   Net 888 ml       Physical Exam  Constitutional:       Comments: Responds only to noxious stimuli   HENT:      Head: Normocephalic and atraumatic.      Right Ear: External ear normal.      Left Ear: External ear normal.      Nose: Nose normal.      Mouth/Throat:      Mouth: Mucous membranes are moist.      Pharynx: Oropharynx is clear.      Comments: Endotracheal and feeding tube in place  Eyes:      Comments: Pupils dilated.  She does not open eyes today even to noxious stimuli   Cardiovascular:      Rate and Rhythm: Regular rhythm. Tachycardia present.   Pulmonary:      Effort: Pulmonary effort is normal.      Breath sounds: Normal breath sounds.   Abdominal:      General: Abdomen is flat.      Palpations: Abdomen is soft.   Genitourinary:     Comments: Garcia catheter in place  Musculoskeletal:         General: Swelling present.      Right lower leg: Edema present.      Left lower leg: Edema present.   Skin:     General: Skin is warm and dry.   Neurological:      Comments: Positive Babinski  Does respond to noxious stimuli with decerebrate posturing of the right arm  Rotates head to the right arm as well  Not moving left at all even to noxious stimuli  Positive gag reflex  No eye response to  stimuli       Scheduled medications  bisacodyl, 10 mg, rectal, Daily  heparin (porcine), 5,000 Units, subcutaneous, q8h CAMI  levETIRAcetam, 500 mg, intravenous, q12h  oxygen, , inhalation, Continuous - Inhalation  pantoprazole, 40 mg, intravenous, q24h  perflutren protein A microsphere, 0.5 mL, intravenous, Once in imaging  sulfur hexafluoride microsphr, 2 mL, intravenous, Once in imaging      Continuous medications     PRN medications  PRN medications: acetaminophen **OR** acetaminophen **OR** acetaminophen, magnesium hydroxide, ondansetron ODT **OR** ondansetron    Relevant Results               Assessment/Plan   This patient currently has cardiac telemetry ordered; if you would like to modify or discontinue the telemetry order, click here to go to the orders activity to modify/discontinue the order.          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    Acute respiratory failure  Bilateral infiltrates with possible aspiration  Comatose state  Elevated troponin: Improving  Drug overdose with unknown downtime  Anoxic brain injury  Systolic congestive heart failure biventricular    Plan:  At this time renal function is improving  Her blood pressure continues to creep up and so we will start her on amlodipine  She did receive her EEG today  We are still waiting on results hopefully we will have them by late today or early tomorrow morning  We need to plan on having a family meeting to discuss goals of care and plan of care going forward  At this time she does not need a perfusion scan.  She has brain activity she is breathing over the ventilator has a gag reflex and so she does not meet criteria for brain death.  Also her ABG is alkalotic breathing on her own currently on the ventilator and so this also points against this.  Continue supportive measures otherwise as she is on  I spent 30 minutes of critical care time directly involved in patient care  excluding billable procedures           Tomás Randall DO

## 2024-08-28 LAB
ANION GAP SERPL CALC-SCNC: 11 MMOL/L (ref 10–20)
BUN SERPL-MCNC: 57 MG/DL (ref 6–23)
CALCIUM SERPL-MCNC: 8.2 MG/DL (ref 8.6–10.3)
CHLORIDE SERPL-SCNC: 113 MMOL/L (ref 98–107)
CO2 SERPL-SCNC: 25 MMOL/L (ref 21–32)
CREAT SERPL-MCNC: 4.09 MG/DL (ref 0.5–1.05)
EGFRCR SERPLBLD CKD-EPI 2021: 13 ML/MIN/1.73M*2
GLUCOSE BLD MANUAL STRIP-MCNC: 133 MG/DL (ref 74–99)
GLUCOSE SERPL-MCNC: 116 MG/DL (ref 74–99)
HOLD SPECIMEN: NORMAL
POTASSIUM SERPL-SCNC: 3.9 MMOL/L (ref 3.5–5.3)
SODIUM SERPL-SCNC: 145 MMOL/L (ref 136–145)

## 2024-08-28 PROCEDURE — 2020000001 HC ICU ROOM DAILY

## 2024-08-28 PROCEDURE — 2500000004 HC RX 250 GENERAL PHARMACY W/ HCPCS (ALT 636 FOR OP/ED): Performed by: INTERNAL MEDICINE

## 2024-08-28 PROCEDURE — 99291 CRITICAL CARE FIRST HOUR: CPT | Performed by: INTERNAL MEDICINE

## 2024-08-28 PROCEDURE — 94681 O2 UPTK CO2 OUTP % O2 XTRC: CPT

## 2024-08-28 PROCEDURE — 82947 ASSAY GLUCOSE BLOOD QUANT: CPT

## 2024-08-28 PROCEDURE — 2500000001 HC RX 250 WO HCPCS SELF ADMINISTERED DRUGS (ALT 637 FOR MEDICARE OP): Performed by: INTERNAL MEDICINE

## 2024-08-28 PROCEDURE — 94762 N-INVAS EAR/PLS OXIMTRY CONT: CPT

## 2024-08-28 PROCEDURE — 94003 VENT MGMT INPAT SUBQ DAY: CPT

## 2024-08-28 PROCEDURE — 99222 1ST HOSP IP/OBS MODERATE 55: CPT

## 2024-08-28 PROCEDURE — 80048 BASIC METABOLIC PNL TOTAL CA: CPT | Performed by: INTERNAL MEDICINE

## 2024-08-28 PROCEDURE — 31720 CLEARANCE OF AIRWAYS: CPT

## 2024-08-28 PROCEDURE — 36415 COLL VENOUS BLD VENIPUNCTURE: CPT | Performed by: INTERNAL MEDICINE

## 2024-08-28 PROCEDURE — 2500000001 HC RX 250 WO HCPCS SELF ADMINISTERED DRUGS (ALT 637 FOR MEDICARE OP)

## 2024-08-28 PROCEDURE — 99291 CRITICAL CARE FIRST HOUR: CPT | Performed by: STUDENT IN AN ORGANIZED HEALTH CARE EDUCATION/TRAINING PROGRAM

## 2024-08-28 PROCEDURE — 2500000005 HC RX 250 GENERAL PHARMACY W/O HCPCS: Performed by: INTERNAL MEDICINE

## 2024-08-28 RX ORDER — AMLODIPINE BESYLATE 5 MG/1
5 TABLET ORAL DAILY
Status: DISCONTINUED | OUTPATIENT
Start: 2024-08-28 | End: 2024-09-07 | Stop reason: HOSPADM

## 2024-08-28 RX ORDER — CARVEDILOL 3.12 MG/1
3.12 TABLET ORAL 2 TIMES DAILY
Status: DISCONTINUED | OUTPATIENT
Start: 2024-08-28 | End: 2024-09-07 | Stop reason: HOSPADM

## 2024-08-28 SDOH — ECONOMIC STABILITY: HOUSING INSECURITY: IN THE PAST 12 MONTHS, HOW MANY TIMES HAVE YOU MOVED WHERE YOU WERE LIVING?: 0

## 2024-08-28 ASSESSMENT — COGNITIVE AND FUNCTIONAL STATUS - GENERAL
MOVING TO AND FROM BED TO CHAIR: TOTAL
MOVING FROM LYING ON BACK TO SITTING ON SIDE OF FLAT BED WITH BEDRAILS: TOTAL
MOBILITY SCORE: 6
CLIMB 3 TO 5 STEPS WITH RAILING: TOTAL
STANDING UP FROM CHAIR USING ARMS: TOTAL
WALKING IN HOSPITAL ROOM: TOTAL
TURNING FROM BACK TO SIDE WHILE IN FLAT BAD: TOTAL

## 2024-08-28 ASSESSMENT — PAIN - FUNCTIONAL ASSESSMENT
PAIN_FUNCTIONAL_ASSESSMENT: CPOT (CRITICAL CARE PAIN OBSERVATION TOOL)

## 2024-08-28 NOTE — PROGRESS NOTES
Fidelia Santiago is a 49 y.o. female on day 6 of admission presenting with anoxic encephalopathy    Subjective   Patient seen and examined this morning.   Continues to be on vent on spontaneous respiration    Objective     Last Recorded Vitals  BP (!) 153/91 (BP Location: Right arm, Patient Position: Lying)   Pulse 86   Temp 36.8 °C (98.2 °F) (Temporal)   Resp 23   Wt 93.4 kg (205 lb 14.6 oz)   SpO2 95%   Intake/Output last 3 Shifts:    Intake/Output Summary (Last 24 hours) at 8/28/2024 1119  Last data filed at 8/28/2024 0936  Gross per 24 hour   Intake 2112 ml   Output 2800 ml   Net -688 ml       Admission Weight  Weight: 79.4 kg (175 lb) (08/21/24 2007)    Daily Weight  08/28/24 : 93.4 kg (205 lb 14.6 oz)    Image Results  EEG  IMPRESSION    Impression  This EEG is indicative severe diffuse encephalopathy. No seizures are recorded.    A full report will be scanned into the patient's chart at a later time.    This report has been interpreted and electronically signed by      Physical Exam  Constitutional:       Appearance: Normal appearance.      Comments: Withdraws from pain, intubated on spontaneous respiration   HENT:      Head: Normocephalic.      Right Ear: Tympanic membrane normal.      Nose: Nose normal.      Mouth/Throat:      Mouth: Mucous membranes are moist.   Eyes:      Pupils: Pupils are equal, round, and reactive to light.   Cardiovascular:      Rate and Rhythm: Normal rate and regular rhythm.   Pulmonary:      Effort: Pulmonary effort is normal.      Comments: On Spontaneous ventilation  Abdominal:      General: Bowel sounds are normal.      Palpations: Abdomen is soft.   Musculoskeletal:      Cervical back: Normal range of motion.   Neurological:      Mental Status: She is alert.      Comments: Unable to assess  Decerebrate posturing          Relevant Results               Assessment/Plan   This patient currently has cardiac telemetry ordered; if you would like to modify or discontinue the  telemetry order, click here to go to the orders activity to modify/discontinue the order.      This patient has a urinary catheter   Reason for the urinary catheter remaining today? critically ill patient who need accurate urinary output measurements    This patient is intubated   Reason for patient to remain intubated today? they are unable to protect their airway        Assessment & Plan    49-year-old obese female with a past medical history of bipolar disorder, dermatofibroma of the left arm, fibromyalgia, chronic pain, nonalcoholic fatty liver disease, and vitamin D deficiency who was brought by the EMS to the emergency room after being found unresponsive by her boyfriend. EMS noted that boyfriend was very aggressive on scene and placed into police custody. In the ER, patient was found to have a sepsis [leukocytosis, hypothermia, lactic acidosis] associated with acute kidney injury, hypercapnic respiratory failure with respiratory acidosis, hypocalcemia, transaminitis, rhabdomyolysis, and elevated troponin. All in the setting of an underlying possible aspiration pneumonia of gram-negative bacteria origin.     Assessment  Acute metabolic encephalopathy  Acute hypoxic aspiratory failure requiring intubation  Aspiration pneumonia  Drug overdose  NSTEMI type II likely secondary demand ischemia  Acute renal failure  Anuria  Rhabdomyolysis  Hypokalemia  Hypocalcemia  Shock liver  Leukocytosis    Plan    Continue current ongoing care  Patient not responding much  Patient continues to be intubated, on spontaneous respiration  Vent per ICU  Continue current ongoing care  Continue tube feeds  Continue to monitor creatinine and urine output  Abx has been stopped   Continue breathing treatments  Amlodipine added for blood pressure  Keppra discontinued  Monitor for hypoglycemia   EEG Severe diffuse encephalopathy  Family wants everything done.  Patient will have a tracheostomy and PEG tube placement  Plan for  placement    DVT prophylaxis: Lovenox  GI prophylaxis: Protonix  Full code                    Junaid Rosenberg MD

## 2024-08-28 NOTE — CARE PLAN
The clinical goals for the shift include discuss plan of care with family.    Problem: Discharge Planning  Goal: Discharge to home or other facility with appropriate resources  Outcome: Progressing     Problem: Knowledge Deficit  Goal: Patient/family/caregiver demonstrates understanding of disease process, treatment plan, medications, and discharge instructions  Outcome: Progressing     Problem: Mechanical Ventilation  Goal: Ability to express needs and understand communication  Outcome: Progressing  Goal: Mobility/activity is maintained at optimum level for patient  Outcome: Progressing     Problem: Skin  Goal: Decreased wound size/increased tissue granulation at next dressing change  Outcome: Progressing  Flowsheets (Taken 8/28/2024 0753)  Decreased wound size/increased tissue granulation at next dressing change: Protective dressings over bony prominences  Goal: Participates in plan/prevention/treatment measures  Outcome: Progressing  Flowsheets (Taken 8/28/2024 0753)  Participates in plan/prevention/treatment measures: Elevate heels  Goal: Prevent/manage excess moisture  Outcome: Progressing  Flowsheets (Taken 8/28/2024 0753)  Prevent/manage excess moisture:   Cleanse incontinence/protect with barrier cream   Moisturize dry skin  Goal: Prevent/minimize sheer/friction injuries  Outcome: Progressing  Flowsheets (Taken 8/28/2024 0753)  Prevent/minimize sheer/friction injuries: Turn/reposition every 2 hours/use positioning/transfer devices  Goal: Promote/optimize nutrition  Outcome: Progressing  Flowsheets (Taken 8/28/2024 0753)  Promote/optimize nutrition: Monitor/record intake including meals  Goal: Promote skin healing  Outcome: Progressing  Flowsheets (Taken 8/28/2024 0753)  Promote skin healing: Turn/reposition every 2 hours/use positioning/transfer devices     Problem: Fall/Injury  Goal: Not fall by end of shift  Outcome: Progressing  Goal: Verbalize understanding of personal risk factors for fall in the  hospital  Outcome: Progressing  Goal: Verbalize understanding of risk factor reduction measures to prevent injury from fall in the home  Outcome: Progressing  Goal: Use assistive devices by end of the shift  Outcome: Progressing  Goal: Pace activities to prevent fatigue by end of the shift  Outcome: Progressing     Problem: Respiratory - Adult  Goal: Achieves optimal ventilation and oxygenation  Outcome: Progressing     Problem: Genitourinary - Adult  Goal: Absence of urinary retention  Outcome: Progressing  Goal: Urinary catheter remains patent  Outcome: Progressing     Problem: Metabolic/Fluid and Electrolytes - Adult  Goal: Electrolytes maintained within normal limits  Outcome: Progressing  Goal: Hemodynamic stability and optimal renal function maintained  Outcome: Progressing  Goal: Glucose maintained within prescribed range  Outcome: Progressing     Problem: Respiratory  Goal: Clear secretions with interventions this shift  Outcome: Progressing  Goal: Minimize anxiety/maximize coping throughout shift  Outcome: Progressing  Goal: Minimal/no exertional discomfort or dyspnea this shift  Outcome: Progressing  Goal: No signs of respiratory distress (eg. Use of accessory muscles. Peds grunting)  Outcome: Progressing  Goal: Patent airway maintained this shift  Outcome: Progressing  Goal: Tolerate mechanical ventilation evidenced by VS/agitation level this shift  Outcome: Progressing  Goal: Tolerate pulmonary toileting this shift  Outcome: Progressing  Goal: Verbalize decreased shortness of breath this shift  Outcome: Progressing  Goal: Wean oxygen to maintain O2 saturation per order/standard this shift  Outcome: Progressing  Goal: Increase self care and/or family involvement in next 24 hours  Outcome: Progressing     Problem: Nutrition  Goal: Tube feed tolerance  Outcome: Progressing     Problem: Pain  Goal: Turns in bed with improved pain control throughout the shift  Outcome: Progressing

## 2024-08-28 NOTE — PROGRESS NOTES
Cardiology Inpatient Progress Note  [facility] [place]    ASSESSMENT AND PLAN  This is a 49 y.o. female with a history of bipolar disorder, dermatofibroma of the left arm, fibromyalgia, chronic pain, nonalcoholic fatty liver disease, and vitamin D deficiency who was brought by the EMS to the emergency room after being found unresponsive by her boyfriend. EMS noted that boyfriend was very aggressive on scene and placed into police custody. She was found unresponsive and emergently intubated by the EMS in the field. EMS gave the patient on the way 4 doses of Narcan without improvement in consciousness. Patient was given in the emergency room IV fluids, ceftriaxone, azithromycin, DuoNebs, insulin, sodium bicarbonate. Patient was found to have a sepsis [leukocytosis, hypothermia, lactic acidosis] associated with acute kidney injury, hypercapnic respiratory failure with respiratory acidosis, hypocalcemia, transaminitis, rhabdomyolysis, and elevated troponin.  Cardiology was consulted for elevated troponin.    Non-ST segment acute myocardial infarction  Heart failure with mildly reduced ejection fraction, likely acute  -Troponin troponins trended to a peak of 1624 on 8/23/2024  -Type I versus type II NSTEMI, likely type II attributable to non-ischemic myocardial injury due to myocardial imbalance in oxygen supply/demand secondary to underlying rhabdomyolysis.  -Echocardiogram 8/22/2024 shows mildly decreased LV systolic function with estimated LVEF of 40% with global hypokinesis and grade 2 LV diastolic dysfunction.  There is also reduced RV systolic function.  -Will continue conservative medical management which is limited by acute kidney injury, current serum creatinine 4.09 with EGFR of 13.  -Will start carvedilol 3.125 mg twice a day.  -If she recovers neurologically we can pursue ischemic evaluation with coronary angiography during hospitalization.    Subjective  No  acute events recorded overnight.  Patient remains intubated.    Objective:  Intake & Output  Net IO Since Admission: 13,158.84 mL [08/28/24 0838]    Today's Weight:  Vitals:    08/28/24 0700   Weight: 93.4 kg (205 lb 14.6 oz)       PHYSICAL EXAM  Physical Exam  Vitals and nursing note reviewed.   Constitutional:       General: She is not in acute distress.  HENT:      Head: Normocephalic and atraumatic.      Mouth/Throat:      Mouth: Mucous membranes are moist.      Pharynx: Oropharynx is clear.   Eyes:      General: No scleral icterus.  Cardiovascular:      Rate and Rhythm: Normal rate and regular rhythm.      Pulses: Normal pulses.      Heart sounds: Normal heart sounds, S1 normal and S2 normal. No murmur heard.     No friction rub.   Pulmonary:      Effort: Pulmonary effort is normal.      Breath sounds: Normal breath sounds.   Abdominal:      General: Bowel sounds are normal. There is no distension.      Palpations: Abdomen is soft.      Tenderness: There is no abdominal tenderness.   Musculoskeletal:         General: No swelling. Normal range of motion.      Right lower leg: No edema.      Left lower leg: No edema.   Skin:     General: Skin is warm and dry.      Capillary Refill: Capillary refill takes less than 2 seconds.      Findings: No rash.   Neurological:      General: No focal deficit present.      GCS: GCS eye subscore is 1. GCS verbal subscore is 1. GCS motor subscore is 3.      Comments: Intubated        Labs:     CMP:  Recent Labs     08/28/24  0353 08/27/24  0508 08/26/24  0433 08/25/24  0401 08/24/24  0439 08/23/24  0357 08/22/24  0612 08/21/24 2016    145 145 143 142 142   < > 138   K 3.9 4.7 3.7 3.7 3.5 3.0*   < > 7.4*   * 112* 109* 106 102 99   < > 104   CO2 25 24 26 28 31 30   < > 25   ANIONGAP 11 14 14 13 13 16   < > 16   BUN 57* 57* 57* 51* 49* 53*   < > 39*   CREATININE 4.09* 4.72* 5.40* 5.85* 5.69* 5.01*   < > 2.94*   EGFR 13* 11* 9* 8* 9* 10*   < > 19*   MG  --  1.98  --   --  "  --  1.62  --  2.26    < > = values in this interval not displayed.     Recent Labs     08/27/24  0508 08/26/24  0433 08/25/24  0401 08/24/24  0439 08/23/24  0357 08/21/24 2016 11/27/23  1147   ALBUMIN 3.0* 3.0* 3.1* 2.9* 2.9*   < > 4.3   ALKPHOS 88 96 108 112* 133*   < > 110   * 186* 296* 436* 767*   < > 12   AST 29 33 64* 134* 394*   < > 13   BILITOT 0.4 0.4 0.5 0.4 0.4   < > 0.4   LIPASE  --   --   --   --   --   --  61    < > = values in this interval not displayed.     CBC:  Recent Labs     08/27/24  0508 08/26/24  0433 08/25/24  0401 08/24/24  0439 08/23/24  0357   WBC 10.7 10.5 10.6 10.4 12.0*   HGB 12.1 11.9* 12.2 12.1 13.3   HCT 38.6 37.6 37.7 37.0 40.2   * 139* 127* 115* 148*   MCV 94 93 92 92 90     COAG: No results for input(s): \"PTT\", \"INR\", \"HAUF\", \"DDIMERVTE\", \"HAPTOGLOBIN\", \"FIBRINOGEN\" in the last 31124 hours.  ABO: No results for input(s): \"ABO\" in the last 14115 hours.  HEME/ENDO:  Recent Labs     12/15/20  0958   HGBA1C 5.1      CARDIAC:   Recent Labs     08/27/24  0508 08/24/24  0439 08/23/24  0357 08/21/24  2144 08/21/24 2016   TROPHS 87* 713* 1,624* 885* 670*   No results for input(s): \"CHOL\", \"LDLF\", \"HDL\", \"TRIG\" in the last 17642 hours.    Inpatient Medications:    Current Facility-Administered Medications:     acetaminophen (Tylenol) tablet 650 mg, 650 mg, oral, q4h PRN **OR** acetaminophen (Tylenol) oral liquid 650 mg, 650 mg, oral, q4h PRN, 650 mg at 08/26/24 0205 **OR** acetaminophen (Tylenol) suppository 650 mg, 650 mg, rectal, q4h PRN, Clarence Vela MD    bisacodyl (Dulcolax) suppository 10 mg, 10 mg, rectal, Daily, Clarence Vela MD, 10 mg at 08/23/24 0900    heparin (porcine) injection 5,000 Units, 5,000 Units, subcutaneous, q8h CAMI, Tomás Randall DO, 5,000 Units at 08/28/24 0611    levETIRAcetam (Keppra) 500 mg in sodium chloride (iso)  mL, 500 mg, intravenous, q12h, Tomás Randall DO, Stopped at 08/27/24 2030    magnesium hydroxide (Milk of Magnesia) " 2,400 mg/10 mL suspension 10 mL, 10 mL, oral, Daily PRN, Clarence Vela MD    ondansetron ODT (Zofran-ODT) disintegrating tablet 4 mg, 4 mg, oral, q8h PRN **OR** ondansetron (Zofran) injection 4 mg, 4 mg, intravenous, q8h PRN, Clarence Vela MD    oxygen (O2) therapy, , inhalation, Continuous - Inhalation, Clarence eVla MD, 28 percent at 08/27/24 1155    pantoprazole (ProtoNix) injection 40 mg, 40 mg, intravenous, q24h, Tomás Randall, , 40 mg at 08/27/24 1129    perflutren protein A microsphere (Optison) injection 0.5 mL, 0.5 mL, intravenous, Once in imaging, Clarence Vela MD    sulfur hexafluoride microsphr (Lumason) injection 24.28 mg, 2 mL, intravenous, Once in imaging, Clarence Vela MD     VITALS  Vitals:    08/28/24 0835   BP:    Pulse:    Resp: 23   Temp:    SpO2: 97%       Cardiology will continue to follow.     Thank you for this interesting clinical case and allowing me to participate in the care of this patient.  Please reach me out if you have any questions or if you need any clarifications regarding the patient's care.    **Disclaimer: This note was dictated by speech recognition, and every effort has been made to prevent any error in transcription, however minor errors may be present**  _________________________________________________________  Angel Enciso, MSN, CNP, ACNPC, CCRN  Division of Cardiovascular Medicine  [facility] [place]  [facility]

## 2024-08-28 NOTE — PROGRESS NOTES
Fidelia Santiago is a 49 y.o. female on day 6 of admission presenting with No Principal Problem: There is no principal problem currently on the Problem List. Please update the Problem List and refresh..      Subjective   Patient seen and examined at the bedside this morning  No major changes at this time       Objective          Vitals 24HR  Heart Rate:  [72-98]   Temp:  [36.7 °C (98.1 °F)-37.2 °C (99 °F)]   Resp:  [12-28]   BP: (138-168)/()   Weight:  [93.4 kg (205 lb 14.6 oz)]   SpO2:  [92 %-99 %]         Intake/Output last 3 Shifts:    Intake/Output Summary (Last 24 hours) at 8/28/2024 1244  Last data filed at 8/28/2024 0936  Gross per 24 hour   Intake 2029 ml   Output 2800 ml   Net -771 ml       Physical Exam  Constitutional:       Comments: Responds only to noxious stimuli   HENT:      Head: Normocephalic and atraumatic.      Right Ear: External ear normal.      Left Ear: External ear normal.      Nose: Nose normal.      Mouth/Throat:      Mouth: Mucous membranes are moist.      Pharynx: Oropharynx is clear.      Comments: Endotracheal and feeding tube in place  Eyes:      Comments: Pupils dilated.  She does not open eyes today even to noxious stimuli   Cardiovascular:      Rate and Rhythm: Regular rhythm. Tachycardia present.   Pulmonary:      Effort: Pulmonary effort is normal.      Breath sounds: Normal breath sounds.   Abdominal:      General: Abdomen is flat.      Palpations: Abdomen is soft.   Genitourinary:     Comments: Garcia catheter in place  Musculoskeletal:         General: Swelling present.      Right lower leg: Edema present.      Left lower leg: Edema present.   Skin:     General: Skin is warm and dry.   Neurological:      Comments: Positive Babinski  Does respond to noxious stimuli with decerebrate posturing of the right arm  Rotates head to the right arm as well  Not moving left at all even to noxious stimuli  Positive gag reflex  No eye response to stimuli       Scheduled  medications  bisacodyl, 10 mg, rectal, Daily  carvedilol, 3.125 mg, oral, BID  heparin (porcine), 5,000 Units, subcutaneous, q8h CAMI  levETIRAcetam, 500 mg, intravenous, q12h  oxygen, , inhalation, Continuous - Inhalation  pantoprazole, 40 mg, intravenous, q24h  perflutren protein A microsphere, 0.5 mL, intravenous, Once in imaging  sulfur hexafluoride microsphr, 2 mL, intravenous, Once in imaging      Continuous medications     PRN medications  PRN medications: acetaminophen **OR** acetaminophen **OR** acetaminophen, magnesium hydroxide, ondansetron ODT **OR** ondansetron    Relevant Results               Assessment/Plan   This patient currently has cardiac telemetry ordered; if you would like to modify or discontinue the telemetry order, click here to go to the orders activity to modify/discontinue the order.          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  Her EEG as suspected shows severe diffuse encephalopathy  Still breathing over the ventilator at this time and stable on respiratory support  I will discontinue Keppra  Add amlodipine for blood pressure  At this point she will need supportive measures going forward to see if she has recovery of brain function over time  I spent 45 minutes of critical care time directly involved in patient care excluding billable procedures             Tomás Randall, DO

## 2024-08-28 NOTE — PROGRESS NOTES
"Music Therapy Note    Fidelia Santiago was referred by Iza Camejo, RN.    Therapy Session  Referral Type: New referral this admission  Visit Type: New visit  Session Start Time: 1406  Session End Time: 1411  Intervention Delivery: Virtual visit  Conflict of Service: None  Number of family members present: 1  Family Present for Session: Parent/Guardian  Family Participation: Interactive     Pre-assessment  Unable to Assess Reason: Cognitive limitation, Physical limitation  Mood/Affect: Calm (intubated)         Treatment/Interventions  Areas of Focus: Emotional support, Coping, Normalization  Music Therapy Interventions: Live music listening    Post-assessment  Unable to Assess Reason: Cognitive limitation, Physical limitation  Mood/Affect: Calm (intubated)  Total Session Time (min): 5 minutes    Narrative  Assessment Detail: MT spoke with pt's father via phone and introduced music therapy services, asked for pt's music preferences. Pt's father Richi stating that \"she loves music\" and \"anything you choose she would like\". Pt's father also sharing that he hasn't been in touch with pt over a year and that he is unaware of her music preferences. Pt's father thanked MT for providing services and supporting his daughter in this way. MT encouraged pt's father to reach out if he needed anything else or had future questions.  Plan: Plan to provide live music listening intervention as a means of normalization, coping, and emotional support.  Intervention: During intervention MT provided live instrumental music on acoustic guitar.  Evaluation: Pt's HR remained 90-94 during intervention. No notable change in pt's affect during music therapy.  Follow-up: MT will follow-up with pt.    Education Documentation  No documentation found.        Expressive Therapies Note  "

## 2024-08-28 NOTE — NURSING NOTE
Call received from [facility] at poison control. Update on patient condition given.

## 2024-08-28 NOTE — PROGRESS NOTES
met with Dr. Randall,  Patient Experience/Nichole, and patient's father/Richi at patient's bedside. Dr. Randall explained that patient's EEG shows that there is still brain activity, though patient's prognosis continues to remain uncertain. Patient's father/Irchi will discuss plan with patient's daughter/Stephanie; Father/Richi is fairly certain that dtr/Stephanie will agree for patient to have tracheostomy and PEG tube placed in order to give patient as much reasonable time as possible in which to recover brain function. Patient's father and daughter live in [place], [place], and are agreeable to having patient at an LTAC vs. ECF near patient's father/dtr's home address once patient is stable after trach/PE [address], [place] [address]. SW to send referrals via [facility].  - 1615: Per [facility], [facility]/[place] foresees that this patient will meet LTACH criteria once patient has a trach and PEG tube placed. Only two ECFs out of seven referrals are considering patient so far. Plan for patient TBD: Likely patient will receive trach and PEG, and will discharge to LTAC vs. ECF in the [place] area. Care Transitions to follow and assist. Lesly Hidalgo, VERNA

## 2024-08-28 NOTE — CARE PLAN
Problem: Mechanical Ventilation  Goal: Oral health is maintained or improved  Outcome: Met     Problem: Skin  Goal: Participates in plan/prevention/treatment measures  Flowsheets (Taken 8/28/2024 0520)  Participates in plan/prevention/treatment measures: Elevate heels  Goal: Prevent/manage excess moisture  Flowsheets (Taken 8/28/2024 0520)  Prevent/manage excess moisture: Cleanse incontinence/protect with barrier cream  Goal: Prevent/minimize sheer/friction injuries  Flowsheets (Taken 8/28/2024 0520)  Prevent/minimize sheer/friction injuries: Turn/reposition every 2 hours/use positioning/transfer devices  Goal: Promote/optimize nutrition  Note: Continuous tube feeds  Goal: Promote skin healing  Flowsheets (Taken 8/28/2024 0520)  Promote skin healing: Assess skin/pad under line(s)/device(s)     Problem: Fall/Injury  Goal: Be free from injury by end of the shift  Outcome: Met   The patient's goals for the shift include      The clinical goals for the shift include tolerate EEG  Goal met

## 2024-08-28 NOTE — CONSULTS
"Nutrition Follow Up Note  Patient has Malnutrition Diagnosis:  (insufficient data)  Nutrition Assessment         8/28/24 Nutrition follow up from initial assessment. No major changes noted. Pt seen and examined this morning at bedside. Still unresponsive. Continues with vent and enteral feeding. Continue with vital 1.5 @ 45ml an hour. Tf provides 1080 ml volume, 1620 calories, 72g of protein, 825 ml free water. Water flushes per MD or 150 ml every 4 hours.     Nutrition History:  Food and Nutrient History: Unable to assess nutrition status at this time     No Known Allergies   GI Symptoms: None     Oral Problems: None          Anthropometrics:  Height: 162.6 cm (5' 4\")   Weight: 93.4 kg (205 lb 14.6 oz)   BMI (Calculated): 35.33  IBW/kg (Dietitian Calculated): 54.5 kg          Weight History:     Weight         8/22/2024  0025 8/25/2024  1900 8/27/2024  0552 8/28/2024  0617 8/28/2024  0700    Weight: 84 kg (185 lb 3 oz) 91.4 kg (201 lb 8 oz) 95.3 kg (210 lb 1.6 oz) 93.4 kg (205 lb 14.6 oz) 93.4 kg (205 lb 14.6 oz)                Nutrition Significant Labs:  CBC Trend:   Results from last 7 days   Lab Units 08/27/24  0508 08/26/24  0433 08/25/24  0401 08/24/24  0439   WBC AUTO x10*3/uL 10.7 10.5 10.6 10.4   RBC AUTO x10*6/uL 4.11 4.05 4.10 4.02   HEMOGLOBIN g/dL 12.1 11.9* 12.2 12.1   HEMATOCRIT % 38.6 37.6 37.7 37.0   MCV fL 94 93 92 92   PLATELETS AUTO x10*3/uL 146* 139* 127* 115*    , BMP Trend:   Results from last 7 days   Lab Units 08/28/24  0353 08/27/24  0508 08/26/24  0433 08/25/24  0401   GLUCOSE mg/dL 116* 118* 117* 120*   CALCIUM mg/dL 8.2* 8.2* 7.9* 7.8*   SODIUM mmol/L 145 145 145 143   POTASSIUM mmol/L 3.9 4.7 3.7 3.7   CO2 mmol/L 25 24 26 28   CHLORIDE mmol/L 113* 112* 109* 106   BUN mg/dL 57* 57* 57* 51*   CREATININE mg/dL 4.09* 4.72* 5.40* 5.85*    , A1C:  Lab Results   Component Value Date    HGBA1C 5.1 12/15/2020     Results from last 7 days   Lab Units 08/28/24  0353 08/27/24  0508 08/26/24  0433 " "08/24/24  0439 08/23/24  0357 08/22/24  0612 08/21/24 2016   GLUCOSE mg/dL 116* 118* 117*   < > 132*   < > 123*   SODIUM mmol/L 145 145 145   < > 142   < > 138   POTASSIUM mmol/L 3.9 4.7 3.7   < > 3.0*   < > 7.4*   CHLORIDE mmol/L 113* 112* 109*   < > 99   < > 104   CO2 mmol/L 25 24 26   < > 30   < > 25   BUN mg/dL 57* 57* 57*   < > 53*   < > 39*   CREATININE mg/dL 4.09* 4.72* 5.40*   < > 5.01*   < > 2.94*   EGFR mL/min/1.73m*2 13* 11* 9*   < > 10*   < > 19*   CALCIUM mg/dL 8.2* 8.2* 7.9*   < > 7.1*   < > 7.3*   PHOSPHORUS mg/dL  --   --   --   --  4.9  --   --    MAGNESIUM mg/dL  --  1.98  --   --  1.62  --  2.26    < > = values in this interval not displayed.     Lab Results   Component Value Date    HGBA1C 5.1 12/15/2020     Results from last 7 days   Lab Units 08/28/24  1159 08/27/24  2327 08/27/24  1120 08/27/24  0026 08/26/24  1856 08/26/24  1136 08/25/24  1123 08/25/24  0215   POCT GLUCOSE mg/dL 133* 121* 123* 105* 125* 118* 132* 117*     Lab Results   Component Value Date    ALBUMIN 3.0 (L) 08/27/2024      No results found for: \"CRP\"        Nutrition Specific Medications:   Scheduled medications:  amLODIPine, 5 mg, nasogastric tube, Daily  bisacodyl, 10 mg, rectal, Daily  carvedilol, 3.125 mg, oral, BID  heparin (porcine), 5,000 Units, subcutaneous, q8h CAMI  oxygen, , inhalation, Continuous - Inhalation  pantoprazole, 40 mg, intravenous, q24h  perflutren protein A microsphere, 0.5 mL, intravenous, Once in imaging  sulfur hexafluoride microsphr, 2 mL, intravenous, Once in imaging      Continuous medications:     PRN medications:  PRN medications: acetaminophen **OR** acetaminophen **OR** acetaminophen, magnesium hydroxide, ondansetron ODT **OR** ondansetron     Nursing Data Per flowsheet:   Stool Appearance: Loose (08/28/24 0600)  Gastrointestinal  Gastrointestinal (WDL): Exceptions to WDL  Abdomen Inspection: Soft  Abdominal Tenderness: Nontender  Bowel Sounds: All quadrants  Bowel Sounds (All Quadrants): " Active  Passing Flatus: Yes  Last BM Date: 08/28/24  Bowel Incontinence: Yes  Stool Appearance: Loose  Stool Color: Brown  Gastrointestinal Additional Assessments:  (OG in place at 51)  Feeding assistance level:      Intake/Output Summary (Last 24 hours) at 8/28/2024 1334  Last data filed at 8/28/2024 1305  Gross per 24 hour   Intake 1946 ml   Output 3550 ml   Net -1604 ml      Critical-Care Pain Observation Score:  [0-1]    Dietary Orders (From admission, onward)       Start     Ordered    08/23/24 0930  Enteral feeding with NPO 45 (Start @ 25 mL/hr and increase as tolerated by 10 mL/every 4 hours); 150; Every 4 hours  Diet effective now        Question Answer Comment   Tube feeding formula: Vital 1.5    Tube feeding continuous rate (mL/hr): 45 Start @ 25 mL/hr and increase as tolerated by 10 mL/every 4 hours   Tube feeding flush (mL): 150    Flush frequency: Every 4 hours        08/23/24 0937                     Estimated Needs:   Total Energy Estimated Needs (kCal): 1635 kCal  Method for Estimating Needs: 30 kcal/kg  Total Protein Estimated Needs (g): 65 g  Method for Estimating Needs: 1.2 g/kg  Total Fluid Estimated Needs (mL):  (other)  Method for Estimating Needs: per MD or 35 ml/kg, 1900 mL       Nutrition Diagnosis   Malnutrition Diagnosis  Patient has Malnutrition Diagnosis:  (insufficient data)    Nutrition Diagnosis  Patient has Nutrition Diagnosis: Yes  Diagnosis Status (1): New  Nutrition Diagnosis 1: Inadequate oral intake  Related to (1): mechanical vent  As Evidenced by (1): npo status, need for enteral nutrition       Nutrition Interventions/Recommendations   Nutrition Prescription:  Suggest Vital 1.5 @ 45 mL/hr. Start @ 25 mL/hr and increase as tolerated by 10 mL/hr every 4 hours; TF provides: 1080 mL volume, 1620 calories, 72 grams protein, 825 mL free water. Water flushes per MD or 150 mL every 4 hours    Nutrition Interventions:   Interventions: Enteral intake  Enteral Intake: Other  (Comment)  Goal: iniate TF, tolerate TF within 48 hours    Collaboration and Referral of Nutrition Care: Collaboration by nutrition professional with other providers  Goal: Discussed wtjos MARI, RN      Recommendations:  Continue with current TF order. Reassess TF order once family makes decisions on next steps of care.          Nutrition Monitoring and Evaluation   Food/Nutrient Related History Monitoring  Monitoring and Evaluation Plan: Energy intake  Energy Intake: Estimated energy intake  Criteria: meet >75% of estimated needs    Body Composition/Growth/Weight History  Monitoring and Evaluation Plan: Weight  Weight: Measured weight  Criteria: stable    Biochemical Data, Medical Tests and Procedures  Monitoring and Evaluation Plan: Electrolyte/renal panel  Electrolyte and Renal Panel: Potassium  Criteria: wnl                 Time Spent (min): 45 minutes

## 2024-08-28 NOTE — PROGRESS NOTES
Music Therapy Note    Fidelia Santiago was referred by Iza Camejo RN.     Therapy Session  Referral Type: New referral this admission  Visit Type: Follow-up visit  Session Start Time: 1630  Session End Time: 1640  Intervention Delivery: In-person  Conflict of Service: None  Family Present for Session: None     Pre-assessment  Unable to Assess Reason: Cognitive limitation, Physical limitation  Mood/Affect: Calm (intubated)         Treatment/Interventions  Areas of Focus: Emotional support, Coping, Normalization  Music Therapy Interventions: Live music listening    Post-assessment  Unable to Assess Reason: Cognitive limitation, Physical limitation  Mood/Affect: Calm (intubated)  Total Session Time (min): 10 minutes    Narrative  Assessment Detail: Pt found resting in bed, intubated, no family present. No family present today according to staff.  Plan: Plan to provide live music listening intervention as a means of normalization, coping, and emotional support.  Intervention: During intervention MT provided live instrumental music on acoustic guitar.  Evaluation: Pt's HR remained 90-94 during intervention. No notable change in pt's affect during music therapy.  Follow-up: MT will follow-up as applicable and pt's family to assess music preferences.    Education Documentation  No documentation found.        Expressive Therapies Note

## 2024-08-29 LAB
ALBUMIN SERPL BCP-MCNC: 3.2 G/DL (ref 3.4–5)
ALP SERPL-CCNC: 94 U/L (ref 33–110)
ALT SERPL W P-5'-P-CCNC: 69 U/L (ref 7–45)
ANION GAP SERPL CALC-SCNC: 12 MMOL/L (ref 10–20)
AST SERPL W P-5'-P-CCNC: 23 U/L (ref 9–39)
BILIRUB SERPL-MCNC: 0.4 MG/DL (ref 0–1.2)
BUN SERPL-MCNC: 52 MG/DL (ref 6–23)
CALCIUM SERPL-MCNC: 8.5 MG/DL (ref 8.6–10.3)
CHLORIDE SERPL-SCNC: 114 MMOL/L (ref 98–107)
CO2 SERPL-SCNC: 24 MMOL/L (ref 21–32)
CREAT SERPL-MCNC: 3.15 MG/DL (ref 0.5–1.05)
EGFRCR SERPLBLD CKD-EPI 2021: 17 ML/MIN/1.73M*2
GLUCOSE SERPL-MCNC: 132 MG/DL (ref 74–99)
HOLD SPECIMEN: NORMAL
POTASSIUM SERPL-SCNC: 4.1 MMOL/L (ref 3.5–5.3)
PROT SERPL-MCNC: 5.9 G/DL (ref 6.4–8.2)
SODIUM SERPL-SCNC: 146 MMOL/L (ref 136–145)

## 2024-08-29 PROCEDURE — 80053 COMPREHEN METABOLIC PANEL: CPT | Performed by: INTERNAL MEDICINE

## 2024-08-29 PROCEDURE — 94762 N-INVAS EAR/PLS OXIMTRY CONT: CPT

## 2024-08-29 PROCEDURE — 2020000001 HC ICU ROOM DAILY

## 2024-08-29 PROCEDURE — 94003 VENT MGMT INPAT SUBQ DAY: CPT

## 2024-08-29 PROCEDURE — 2500000005 HC RX 250 GENERAL PHARMACY W/O HCPCS: Performed by: INTERNAL MEDICINE

## 2024-08-29 PROCEDURE — 31720 CLEARANCE OF AIRWAYS: CPT

## 2024-08-29 PROCEDURE — 94681 O2 UPTK CO2 OUTP % O2 XTRC: CPT

## 2024-08-29 PROCEDURE — 2500000001 HC RX 250 WO HCPCS SELF ADMINISTERED DRUGS (ALT 637 FOR MEDICARE OP): Performed by: INTERNAL MEDICINE

## 2024-08-29 PROCEDURE — 99291 CRITICAL CARE FIRST HOUR: CPT | Performed by: STUDENT IN AN ORGANIZED HEALTH CARE EDUCATION/TRAINING PROGRAM

## 2024-08-29 PROCEDURE — 2500000001 HC RX 250 WO HCPCS SELF ADMINISTERED DRUGS (ALT 637 FOR MEDICARE OP)

## 2024-08-29 PROCEDURE — 99233 SBSQ HOSP IP/OBS HIGH 50: CPT | Performed by: HOSPITALIST

## 2024-08-29 PROCEDURE — 2500000001 HC RX 250 WO HCPCS SELF ADMINISTERED DRUGS (ALT 637 FOR MEDICARE OP): Performed by: HOSPITALIST

## 2024-08-29 PROCEDURE — 36415 COLL VENOUS BLD VENIPUNCTURE: CPT | Performed by: INTERNAL MEDICINE

## 2024-08-29 PROCEDURE — 99291 CRITICAL CARE FIRST HOUR: CPT | Performed by: INTERNAL MEDICINE

## 2024-08-29 PROCEDURE — 2500000004 HC RX 250 GENERAL PHARMACY W/ HCPCS (ALT 636 FOR OP/ED): Performed by: INTERNAL MEDICINE

## 2024-08-29 RX ORDER — FAMOTIDINE 20 MG/1
20 TABLET, FILM COATED ORAL DAILY
Status: DISCONTINUED | OUTPATIENT
Start: 2024-08-29 | End: 2024-09-07 | Stop reason: HOSPADM

## 2024-08-29 RX ORDER — NYSTATIN 100000 [USP'U]/G
1 POWDER TOPICAL 2 TIMES DAILY
Status: DISCONTINUED | OUTPATIENT
Start: 2024-08-29 | End: 2024-09-07 | Stop reason: HOSPADM

## 2024-08-29 ASSESSMENT — PAIN - FUNCTIONAL ASSESSMENT
PAIN_FUNCTIONAL_ASSESSMENT: CPOT (CRITICAL CARE PAIN OBSERVATION TOOL)

## 2024-08-29 NOTE — PROGRESS NOTES
Subjective Data:  Patient remains unresponsive at this point. Hemodynamically stable.     Overnight Events:    No     Objective Data:  Last Recorded Vitals:  Vitals:    08/29/24 0600 08/29/24 0635 08/29/24 0700 08/29/24 0845   BP: (!) 162/98  (!) 162/101    BP Location:   Right arm    Patient Position:   Lying    Pulse: 99      Resp: 20      Temp:   36.4 °C (97.5 °F)    TempSrc:   Temporal    SpO2:  96% 96% 97%   Weight:       Height:           Last Labs:  CBC - 8/27/2024:  5:08 AM  10.7 12.1 146    38.6      CMP - 8/29/2024:  5:24 AM  8.5 5.9 23 --- 0.4   4.9 3.2 69 94      PTT - No results in last year.  _   _ _     TROPHS   Date/Time Value Ref Range Status   08/27/2024 05:08 AM 87 0 - 13 ng/L Final   08/24/2024 04:39  0 - 13 ng/L Final   08/23/2024 03:57 AM 1,624 0 - 13 ng/L Final     HGBA1C   Date/Time Value Ref Range Status   12/15/2020 09:58 AM 5.1 4.0 - 5.6 % Final      Last I/O:  I/O last 3 completed shifts:  In: 3348 (35.8 mL/kg) [NG/GT:3148; IV Piggyback:200]  Out: 5450 (58.4 mL/kg) [Urine:5450 (1.6 mL/kg/hr)]  Weight: 93.4 kg     Past Cardiology Tests (Last 3 Years):  EKG:  ECG 12 lead 08/21/2024    Echo:  Transthoracic Echo (TTE) Limited 08/22/2024    Ejection Fractions:  EF   Date/Time Value Ref Range Status   08/22/2024 06:00 AM 40 %      Cath:  No results found for this or any previous visit from the past 1095 days.    Stress Test:  No results found for this or any previous visit from the past 1095 days.    Cardiac Imaging:  XR chest abdomen for OG NG placement 08/21/2024      Inpatient Medications:  Scheduled medications   Medication Dose Route Frequency    amLODIPine  5 mg nasogastric tube Daily    bisacodyl  10 mg rectal Daily    carvedilol  3.125 mg oral BID    heparin (porcine)  5,000 Units subcutaneous q8h CAMI    oxygen   inhalation Continuous - Inhalation    pantoprazole  40 mg intravenous q24h    perflutren protein A microsphere  0.5 mL intravenous Once in imaging    sulfur hexafluoride  microsphr  2 mL intravenous Once in imaging     PRN medications   Medication    acetaminophen    Or    acetaminophen    Or    acetaminophen    magnesium hydroxide    ondansetron ODT    Or    ondansetron     Continuous Medications   Medication Dose Last Rate       Physical Exam:  General: Unconscious, intubated  Neck: supple; trachea midline; no masses; no JVD; intubated  Chest: clear breath sounds bilaterally; no wheezing; on Mechanical ventilation  Cardio: regular rhythm, S1S2 normal, no murmurs  Abdomen: Soft, non-tender, non-distension, no organomegaly  Extremities: no clubbing/cyanosis/edema     Assessment/Plan     Mrs. Fidelia Santiago is a 49 y.o. non-smoker female being consulted by the Cardiology team for troponin leak. Patient with prior medical history significant for bipolar disorder, dermatofibroma of the left arm, fibromyalgia, chronic pain, nonalcoholic fatty liver disease, and vitamin D deficiency. Patient intubated and cannot give reliable information at this point. History has been obtained from previous records. Per chart review,   she was brought by the EMS to the emergency room after being found unresponsive by her boyfriend. EMS noted that boyfriend was very aggressive on scene and placed into police custody. She was found unresponsive and emergently intubated by the EMS in the field. On presentation to the ER, blood pressure 118/101, respiratory rate 30, temperature 34.3, heart rate 92. Pertinent findings on blood workup; WBC 13,000, troponin 670 --> 885, potassium 7.4, creatinine 2.94, calcium 7.3, alkaline phosphatase 216, ALT 1750, AST 2674, creatinine kinase 1830 and lactic acid 2.9 ABG showed a pH of 7.10, pCO2 of 63 and pO2 of 80. U tox screen came back negative. Urinalysis came back grossly within normal limits. CT scan of the chest abdomen and pelvis showed bilateral lower lobe infiltrates compatible with pneumonia presumably aspiration. There is also bilateral perinephric stranding.  And constipation. EMS gave the patient on the way 4 doses of Narcan without improvement in consciousness. Patient was given in the emergency room IV fluids, ceftriaxone, azithromycin, DuoNebs, insulin, sodium bicarbonate. Patient was found to have a sepsis [leukocytosis, hypothermia, lactic acidosis] associated with acute kidney injury, hypercapnic respiratory failure with respiratory acidosis, hypocalcemia, transaminitis, rhabdomyolysis, and elevated troponin. All in the setting of an underlying possible aspiration pneumonia of gram-negative bacteria origin. She has been admitted for clinical compensation.     Assessment     # Troponin Elevation  - Trop 670 --> 885 --> 1,624 --> 713.  - Troponin elevation is likely attributable to non-ischemic myocardial injury due to myocardial imbalance in oxygen supply/demand secondary to underlying rhabdomyolysis.   - Would suggest to continue current medical management per primary team.    - Hydration.  - Echo (08/22/2024):   1. The left ventricular systolic function is moderately decreased, with a visually estimated ejection fraction of 40%.   2. There is global hypokinesis of the left ventricle with minor regional variations.   3. Spectral Doppler shows a pseudonormal pattern of left ventricular diastolic filling.   4. There is reduced right ventricular systolic function  - Once patient is more stable, we can discuss options to rule out cardiac ischemia.     # Rhabdomyolysis / HERI  - Crea 3.4 --> 5.8 --> 3.15  - Ph 7.15 --> 7.5  - ALT 1376 --> 296  - AST 1494 --> 64  - Lactate 2.4  - Would suggest to capitalize on hydration.  - Nephrology recs.     # Aspirative Pneumonia  - Would suggest to keep broad spectrum antibiotics.  - Would suggest to titrate hydration according to clinical status.       This critically ill patient continues to be at-risk for clinically significant deterioration / failure due to the above mentioned dysfunctional, unstable organ systems.  I have  personally identified and managed all complex critical care issues to prevent aforementioned clinical deterioration.  Critical care time is spent at bedside and/or the immediate area and has included, but is not limited to, the review of diagnostic tests, labs, radiographs, serial assessments of hemodynamics, respiratory status, ventilatory management, and family updates.  Time spent in procedures and teaching are reported separately.     Critical care time: 50 minutes     Peripheral IV 08/21/24 20 G Left Antecubital (Active)   Site Assessment Clean;Dry;Intact 08/29/24 0600   Dressing Type Transparent 08/29/24 0600   Line Status Flushed 08/29/24 0600   Dressing Status Clean;Dry;Occlusive 08/29/24 0600   Number of days: 8       Peripheral IV 08/21/24 16 G Right Antecubital (Active)   Site Assessment Clean;Dry;Intact 08/29/24 0600   Dressing Type Transparent 08/29/24 0600   Line Status Flushed 08/29/24 0600   Dressing Status Clean;Dry;Occlusive 08/29/24 0600   Number of days: 8       ETT  6.5 mm (Active)   Secured at (cm) 22 cm 08/29/24 0845   Measured from Lips 08/29/24 0845   Secured Location Right 08/29/24 0845   Secured by Commercial tube felipe 08/29/24 0845   Site Condition Dry 08/29/24 0845   Number of days: 8       NG/OG/Feeding Tube OG - [place] sump 14 Fr Center mouth (Active)   Site Assessment Clean;Dry;Intact 08/29/24 0717   Number of days: 8       Urethral Catheter Straight-tip 16 Fr. (Active)   Site Assessment Clean;Skin intact;Pink 08/29/24 0731   Number of days: 8     Code Status:  Full Code    Jason Berman MD  Cardiology

## 2024-08-29 NOTE — CARE PLAN
The patient's goals for the shift include      The clinical goals for the shift include discuss plan of care with family      Problem: Discharge Planning  Goal: Discharge to home or other facility with appropriate resources  Outcome: Progressing     Problem: Knowledge Deficit  Goal: Patient/family/caregiver demonstrates understanding of disease process, treatment plan, medications, and discharge instructions  Outcome: Progressing     Problem: Mechanical Ventilation  Goal: Ability to express needs and understand communication  Outcome: Progressing  Goal: Mobility/activity is maintained at optimum level for patient  Outcome: Progressing     Problem: Skin  Goal: Decreased wound size/increased tissue granulation at next dressing change  Outcome: Progressing  Goal: Participates in plan/prevention/treatment measures  Outcome: Progressing  Goal: Prevent/manage excess moisture  Outcome: Progressing  Goal: Prevent/minimize sheer/friction injuries  Outcome: Progressing  Goal: Promote/optimize nutrition  Outcome: Progressing  Goal: Promote skin healing  Outcome: Progressing     Problem: Fall/Injury  Goal: Not fall by end of shift  Outcome: Progressing  Goal: Verbalize understanding of personal risk factors for fall in the hospital  Outcome: Progressing  Goal: Verbalize understanding of risk factor reduction measures to prevent injury from fall in the home  Outcome: Progressing  Goal: Use assistive devices by end of the shift  Outcome: Progressing  Goal: Pace activities to prevent fatigue by end of the shift  Outcome: Progressing     Problem: Respiratory - Adult  Goal: Achieves optimal ventilation and oxygenation  Outcome: Progressing     Problem: Genitourinary - Adult  Goal: Absence of urinary retention  Outcome: Progressing  Goal: Urinary catheter remains patent  Outcome: Progressing     Problem: Metabolic/Fluid and Electrolytes - Adult  Goal: Electrolytes maintained within normal limits  Outcome: Progressing  Goal:  Hemodynamic stability and optimal renal function maintained  Outcome: Progressing  Goal: Glucose maintained within prescribed range  Outcome: Progressing     Problem: Respiratory  Goal: Clear secretions with interventions this shift  Outcome: Progressing  Goal: Minimize anxiety/maximize coping throughout shift  Outcome: Progressing  Goal: Minimal/no exertional discomfort or dyspnea this shift  Outcome: Progressing  Goal: No signs of respiratory distress (eg. Use of accessory muscles. Peds grunting)  Outcome: Progressing  Goal: Patent airway maintained this shift  Outcome: Progressing  Goal: Tolerate mechanical ventilation evidenced by VS/agitation level this shift  Outcome: Progressing  Goal: Tolerate pulmonary toileting this shift  Outcome: Progressing  Goal: Verbalize decreased shortness of breath this shift  Outcome: Progressing  Goal: Wean oxygen to maintain O2 saturation per order/standard this shift  Outcome: Progressing  Goal: Increase self care and/or family involvement in next 24 hours  Outcome: Progressing     Problem: Nutrition  Goal: Tube feed tolerance  Outcome: Progressing     Problem: Pain  Goal: Turns in bed with improved pain control throughout the shift  Outcome: Progressing

## 2024-08-29 NOTE — PROGRESS NOTES
"Fidelia Santiago is a 49 y.o. female on day 7 of admission presenting with No Principal Problem: There is no principal problem currently on the Problem List. Please update the Problem List and refresh..    Subjective   Patient not responding mentally  Diffuse encephalopathy       Objective     Physical Exam  General Appearance: AAO x 0  Skin: skin color pink, warm, and dry; no suspicious rashes or lesions  Eyes : Pupils dilated  ENT: mucous membranes pink and moist, ETT in feeding tube  Neck: normocephalic  Respiratory: lungs clear to auscultation anteriorly; no wheezing, rhonchi, or crackles.   Heart: regular rate and rhythm.  Tachycardia  Abdomen: Nondistended, positive bowel sounds x4, soft,  nontender  Extremities:  edema   Peripheral pulses: normal x4 extremities  Neuro: Responds to noxious painful stimuli with decerebrate posturing of right arm.  Gag reflex intact  No movement to left at all with noxious stimuli  Babinski is present    Last Recorded Vitals  Blood pressure (!) 146/94, pulse 97, temperature 36.4 °C (97.5 °F), temperature source Temporal, resp. rate 21, height 1.626 m (5' 4\"), weight 93.4 kg (205 lb 14.6 oz), SpO2 96%.  Intake/Output last 3 Shifts:  I/O last 3 completed shifts:  In: 1801 (19.3 mL/kg) [NG/GT:1701; IV Piggyback:100]  Out: 4125 (44.2 mL/kg) [Urine:4125 (1.2 mL/kg/hr)]  Weight: 93.4 kg     Relevant Results  Scheduled medications  amLODIPine, 5 mg, nasogastric tube, Daily  bisacodyl, 10 mg, rectal, Daily  carvedilol, 3.125 mg, oral, BID  famotidine, 20 mg, nasogastric tube, Daily  heparin (porcine), 5,000 Units, subcutaneous, q8h CAMI  nystatin, 1 Application, Topical, BID  oxygen, , inhalation, Continuous - Inhalation  perflutren protein A microsphere, 0.5 mL, intravenous, Once in imaging  sulfur hexafluoride microsphr, 2 mL, intravenous, Once in imaging      Continuous medications     PRN medications  PRN medications: acetaminophen **OR** acetaminophen **OR** acetaminophen, " magnesium hydroxide, ondansetron ODT **OR** ondansetron    Results for orders placed or performed during the hospital encounter of 08/21/24 (from the past 24 hour(s))   Comprehensive Metabolic Panel   Result Value Ref Range    Glucose 132 (H) 74 - 99 mg/dL    Sodium 146 (H) 136 - 145 mmol/L    Potassium 4.1 3.5 - 5.3 mmol/L    Chloride 114 (H) 98 - 107 mmol/L    Bicarbonate 24 21 - 32 mmol/L    Anion Gap 12 10 - 20 mmol/L    Urea Nitrogen 52 (H) 6 - 23 mg/dL    Creatinine 3.15 (H) 0.50 - 1.05 mg/dL    eGFR 17 (L) >60 mL/min/1.73m*2    Calcium 8.5 (L) 8.6 - 10.3 mg/dL    Albumin 3.2 (L) 3.4 - 5.0 g/dL    Alkaline Phosphatase 94 33 - 110 U/L    Total Protein 5.9 (L) 6.4 - 8.2 g/dL    AST 23 9 - 39 U/L    Bilirubin, Total 0.4 0.0 - 1.2 mg/dL    ALT 69 (H) 7 - 45 U/L   Lavender Top   Result Value Ref Range    Extra Tube Hold for add-ons.                       Assessment/Plan     49-year-old female with history of  Bipolar disorder  Dermatofibroma of left arm  Fibromyalgia  Chronic pain  Nonalcoholic fatty liver disease  Vitamin D deficiency  Who presented initially with  Unresponsiveness  Sepsis  HERI  Acute hypercapnic respiratory failure  Respiratory acidosis  Hypocalcemia  Transaminitis  Severe rhabdomyolysis  Elevated troponin  Aspiration pneumonia of gram-negative bacteria origin, leukocytosis  Acute metabolic encephalopathy  Acute hypoxemic respiratory failure requiring intubation  Drug overdose  NSTEMI type II likely demand ischemia  Hypokalemia  Shock liver    Plan  Continue on current vent settings  Patient not responding  Status post antibiotics  Continue to monitor BMP and renal function  Breathing treatments  Carvedilol and Norvasc for hypertension  EEG with severe diffuse encephalopathy  Full code  Plan for possible tracheostomy and PEG tube placement  Placement to LTAC per case management afterwards  Echo with LVEF 40%  Once medically stable, can rule out cardiac ischemia per cardiology  Hydrate with tube  feeding, renal function improving  Urine output better  H2 blockers  Heparin for DVT prophylaxis  Tube feeds per nutrition  Neurologically, no signs of meaningful recovery  Tylenol for fever increase  Antiemetics  Supportive care symptomatic management  Replace electrolytes as deemed appropriate  Avoid sedatives  Follow clinically and mentation                      Bruno Valencia MD

## 2024-08-29 NOTE — PROGRESS NOTES
Fidelia Santiago is a 49 y.o. female on day 7 of admission presenting with No Principal Problem: There is no principal problem currently on the Problem List. Please update the Problem List and refresh..      Subjective   Patient seen and examined at the bedside this morning  Having good urine output  No acute events or issues noted         Objective          Vitals 24HR  Heart Rate:  []   Temp:  [36.1 °C (97 °F)-36.8 °C (98.2 °F)]   Resp:  [12-26]   BP: (138-162)/()   SpO2:  [92 %-99 %]         Intake/Output last 3 Shifts:    Intake/Output Summary (Last 24 hours) at 8/29/2024 0906  Last data filed at 8/29/2024 0700  Gross per 24 hour   Intake 1801 ml   Output 3700 ml   Net -1899 ml       Physical Exam  Constitutional:       Comments: Responds only to noxious stimuli   HENT:      Head: Normocephalic and atraumatic.      Right Ear: External ear normal.      Left Ear: External ear normal.      Nose: Nose normal.      Mouth/Throat:      Mouth: Mucous membranes are moist.      Pharynx: Oropharynx is clear.      Comments: Endotracheal and feeding tube in place  Eyes:      Comments: Pupils dilated.  She does not open eyes today even to noxious stimuli   Cardiovascular:      Rate and Rhythm: Regular rhythm. Tachycardia present.   Pulmonary:      Effort: Pulmonary effort is normal.      Breath sounds: Normal breath sounds.   Abdominal:      General: Abdomen is flat.      Palpations: Abdomen is soft.   Genitourinary:     Comments: Garcia catheter in place  Musculoskeletal:         General: Swelling present.      Right lower leg: Edema present.      Left lower leg: Edema present.   Skin:     General: Skin is warm and dry.   Neurological:      Comments: Positive Babinski  Does respond to noxious stimuli with decerebrate posturing of the right arm  Rotates head to the right arm as well  Not moving left at all even to noxious stimuli  Positive gag reflex  No eye response to stimuli     Scheduled  medications  amLODIPine, 5 mg, nasogastric tube, Daily  bisacodyl, 10 mg, rectal, Daily  carvedilol, 3.125 mg, oral, BID  heparin (porcine), 5,000 Units, subcutaneous, q8h CAMI  oxygen, , inhalation, Continuous - Inhalation  pantoprazole, 40 mg, intravenous, q24h  perflutren protein A microsphere, 0.5 mL, intravenous, Once in imaging  sulfur hexafluoride microsphr, 2 mL, intravenous, Once in imaging      Continuous medications     PRN medications  PRN medications: acetaminophen **OR** acetaminophen **OR** acetaminophen, magnesium hydroxide, ondansetron ODT **OR** ondansetron      Relevant Results               Assessment/Plan   This patient currently has cardiac telemetry ordered; if you would like to modify or discontinue the telemetry order, click here to go to the orders activity to modify/discontinue the order.          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

## 2024-08-29 NOTE — PROGRESS NOTES
"Per medical team, patient continues to remain on vent, unresponsive. Plan of care is for patient to receive trach and PEG placement on Tuesday; Monday is Labor Day holiday.  and  Patient Experience/Nichole went to patient's bedside to speak to patient, and assure patient that Care Transitions will continue to follow and assist with patient's discharge planning. SW/DSC to continue to send updated notes to referral facilities via [facility] as notes available.  - 1445: [facility] to see [facility] documents. SW realized that, as patient's EMR is marked \"Confidential\" documents must be Print Attached to the referral. SW completed same, and awaits response from facilities. Plan for patient TBD: Likely patient will receive trach and PEG, and will discharge to LTAC vs. ECF in the [place] area near father's address: [address], [place] [address]. Care Transitions to follow and assist. VERNA Garcia      "

## 2024-08-29 NOTE — NURSING NOTE
Pt resting calmly in bed, does not respond to sternal rub, decerebrate wrist and arm movement when rolled and positioned by staff.

## 2024-08-30 LAB
ALBUMIN SERPL BCP-MCNC: 3.4 G/DL (ref 3.4–5)
ALP SERPL-CCNC: 93 U/L (ref 33–110)
ALT SERPL W P-5'-P-CCNC: 60 U/L (ref 7–45)
ANION GAP SERPL CALC-SCNC: 13 MMOL/L (ref 10–20)
AST SERPL W P-5'-P-CCNC: 23 U/L (ref 9–39)
BILIRUB SERPL-MCNC: 0.4 MG/DL (ref 0–1.2)
BUN SERPL-MCNC: 48 MG/DL (ref 6–23)
CALCIUM SERPL-MCNC: 8.6 MG/DL (ref 8.6–10.3)
CHLORIDE SERPL-SCNC: 116 MMOL/L (ref 98–107)
CO2 SERPL-SCNC: 23 MMOL/L (ref 21–32)
CREAT SERPL-MCNC: 2.46 MG/DL (ref 0.5–1.05)
EGFRCR SERPLBLD CKD-EPI 2021: 23 ML/MIN/1.73M*2
ERYTHROCYTE [DISTWIDTH] IN BLOOD BY AUTOMATED COUNT: 13.2 % (ref 11.5–14.5)
GLUCOSE BLD MANUAL STRIP-MCNC: 146 MG/DL (ref 74–99)
GLUCOSE SERPL-MCNC: 112 MG/DL (ref 74–99)
HCT VFR BLD AUTO: 43.2 % (ref 36–46)
HGB BLD-MCNC: 13.7 G/DL (ref 12–16)
MCH RBC QN AUTO: 30.2 PG (ref 26–34)
MCHC RBC AUTO-ENTMCNC: 31.7 G/DL (ref 32–36)
MCV RBC AUTO: 95 FL (ref 80–100)
NRBC BLD-RTO: 0 /100 WBCS (ref 0–0)
PLATELET # BLD AUTO: 185 X10*3/UL (ref 150–450)
POTASSIUM SERPL-SCNC: 4.4 MMOL/L (ref 3.5–5.3)
PROT SERPL-MCNC: 6.1 G/DL (ref 6.4–8.2)
RBC # BLD AUTO: 4.54 X10*6/UL (ref 4–5.2)
SODIUM SERPL-SCNC: 148 MMOL/L (ref 136–145)
WBC # BLD AUTO: 15.9 X10*3/UL (ref 4.4–11.3)

## 2024-08-30 PROCEDURE — 2500000001 HC RX 250 WO HCPCS SELF ADMINISTERED DRUGS (ALT 637 FOR MEDICARE OP)

## 2024-08-30 PROCEDURE — 2500000001 HC RX 250 WO HCPCS SELF ADMINISTERED DRUGS (ALT 637 FOR MEDICARE OP): Performed by: HOSPITALIST

## 2024-08-30 PROCEDURE — 31720 CLEARANCE OF AIRWAYS: CPT

## 2024-08-30 PROCEDURE — 2500000001 HC RX 250 WO HCPCS SELF ADMINISTERED DRUGS (ALT 637 FOR MEDICARE OP): Performed by: INTERNAL MEDICINE

## 2024-08-30 PROCEDURE — 2500000004 HC RX 250 GENERAL PHARMACY W/ HCPCS (ALT 636 FOR OP/ED): Performed by: HOSPITALIST

## 2024-08-30 PROCEDURE — 99233 SBSQ HOSP IP/OBS HIGH 50: CPT | Performed by: HOSPITALIST

## 2024-08-30 PROCEDURE — 94003 VENT MGMT INPAT SUBQ DAY: CPT

## 2024-08-30 PROCEDURE — 99291 CRITICAL CARE FIRST HOUR: CPT | Performed by: STUDENT IN AN ORGANIZED HEALTH CARE EDUCATION/TRAINING PROGRAM

## 2024-08-30 PROCEDURE — 2500000004 HC RX 250 GENERAL PHARMACY W/ HCPCS (ALT 636 FOR OP/ED): Performed by: INTERNAL MEDICINE

## 2024-08-30 PROCEDURE — 99291 CRITICAL CARE FIRST HOUR: CPT | Performed by: INTERNAL MEDICINE

## 2024-08-30 PROCEDURE — 2500000005 HC RX 250 GENERAL PHARMACY W/O HCPCS: Performed by: INTERNAL MEDICINE

## 2024-08-30 PROCEDURE — 85027 COMPLETE CBC AUTOMATED: CPT | Performed by: HOSPITALIST

## 2024-08-30 PROCEDURE — 94762 N-INVAS EAR/PLS OXIMTRY CONT: CPT

## 2024-08-30 PROCEDURE — 82947 ASSAY GLUCOSE BLOOD QUANT: CPT

## 2024-08-30 PROCEDURE — 80053 COMPREHEN METABOLIC PANEL: CPT | Performed by: INTERNAL MEDICINE

## 2024-08-30 PROCEDURE — 36415 COLL VENOUS BLD VENIPUNCTURE: CPT | Performed by: HOSPITALIST

## 2024-08-30 PROCEDURE — 2020000001 HC ICU ROOM DAILY

## 2024-08-30 RX ORDER — DEXTROSE MONOHYDRATE 50 MG/ML
50 INJECTION, SOLUTION INTRAVENOUS CONTINUOUS
Status: DISCONTINUED | OUTPATIENT
Start: 2024-08-30 | End: 2024-08-30

## 2024-08-30 ASSESSMENT — PAIN - FUNCTIONAL ASSESSMENT

## 2024-08-30 ASSESSMENT — COGNITIVE AND FUNCTIONAL STATUS - GENERAL
TURNING FROM BACK TO SIDE WHILE IN FLAT BAD: TOTAL
PERSONAL GROOMING: TOTAL
CLIMB 3 TO 5 STEPS WITH RAILING: TOTAL
MOVING FROM LYING ON BACK TO SITTING ON SIDE OF FLAT BED WITH BEDRAILS: TOTAL
DRESSING REGULAR UPPER BODY CLOTHING: TOTAL
STANDING UP FROM CHAIR USING ARMS: TOTAL
DAILY ACTIVITIY SCORE: 6
EATING MEALS: TOTAL
WALKING IN HOSPITAL ROOM: TOTAL
MOBILITY SCORE: 6
MOVING TO AND FROM BED TO CHAIR: TOTAL
HELP NEEDED FOR BATHING: TOTAL
DRESSING REGULAR LOWER BODY CLOTHING: TOTAL
TOILETING: TOTAL

## 2024-08-30 NOTE — CARE PLAN
The patient's goals for the shift include      The clinical goals for the shift include pt will maintain patent airway    Over the shift, the patient did not make progress toward the following goals. Barriers to progression include . Recommendations to address these barriers include .

## 2024-08-30 NOTE — PROGRESS NOTES
Per medical team, patient continues to remain on vent, unresponsive. Plan of care is for patient to receive trach and PEG placement on Tuesday; Monday is Labor Day holiday.  and  Patient Experience/Nichole went to patient's bedside to speak to patient, and assure patient that Care Transitions will continue to follow and assist with patient's discharge planning. Per [place], [facility] in [place] is able to accept patient, pending placement of trach/PEG. ([address], [place] [address]). - 1610: Per [facility], [facility] also [facility] patient. SW asked [facility] to update [facility] re: which facility will be most appropriate for patient, so that SW may review options with patient's father/Richi whose address is [address], [place] [address]. [facility] is about 30 mins away; [facility] are also about 30 mins away. Care Transitions to follow and assist. Lesly Hidalgo, VERNA

## 2024-08-30 NOTE — PROGRESS NOTES
Fidelia Santiago is a 49 y.o. female on day 8 of admission presenting with No Principal Problem: There is no principal problem currently on the Problem List. Please update the Problem List and refresh..      Subjective   Patient seen and examined at the bedside this morning  Still having very watery diarrhea  White count has bumped up a little bit  No other major changes noted       Objective          Vitals 24HR  Heart Rate:  []   Temp:  [35.8 °C (96.4 °F)-36.7 °C (98.1 °F)]   Resp:  [11-26]   BP: (137-161)/()   SpO2:  [94 %-98 %]         Intake/Output last 3 Shifts:    Intake/Output Summary (Last 24 hours) at 8/30/2024 0825  Last data filed at 8/30/2024 0800  Gross per 24 hour   Intake 1875 ml   Output 2125 ml   Net -250 ml       Physical Exam  Constitutional:       Comments: Responds only to noxious stimuli   HENT:      Head: Normocephalic and atraumatic.      Right Ear: External ear normal.      Left Ear: External ear normal.      Nose: Nose normal.      Mouth/Throat:      Mouth: Mucous membranes are moist.      Pharynx: Oropharynx is clear.      Comments: Endotracheal and feeding tube in place  Eyes:      Comments: Pupils dilated.  She does not open eyes today even to noxious stimuli   Cardiovascular:      Rate and Rhythm: Regular rhythm. Tachycardia present.   Pulmonary:      Effort: Pulmonary effort is normal.      Breath sounds: Normal breath sounds.   Abdominal:      General: Abdomen is flat.      Palpations: Abdomen is soft.   Genitourinary:     Comments: Garcia catheter in place  Musculoskeletal:         General: Swelling present.      Right lower leg: Edema present.      Left lower leg: Edema present.   Skin:     General: Skin is warm and dry.   Neurological:      Comments: Positive Babinski  Does respond to noxious stimuli with decerebrate posturing of the right arm  Rotates head to the right arm as well  Not moving left at all even to noxious stimuli  Positive gag reflex  No eye response to  stimuli         Relevant Results               Assessment/Plan   This patient currently has cardiac telemetry ordered; if you would like to modify or discontinue the telemetry order, click here to go to the orders activity to modify/discontinue the order.    This patient has a urinary catheter   Reason for the urinary catheter remaining today? critically ill patient who need accurate urinary output measurements    This patient is intubated   Reason for patient to remain intubated today? 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   Acute respiratory failure  Comatose state  Drug overdose with unknown downtime  Anoxic brain injury  Systolic congestive heart failure biventricular  Hypernatremia  Leukocytosis  Diarrhea    Plan:  At this time clinically she remains stable  Neurological status shows no changes currently  She is having very watery diarrhea and starting to excoriate her skin  We will place a rectal tube  Continue ventilatory support as she is on  I increased her free water flushes yesterday we will have to watch this and may need to increase them further  Renal function continues to show signs of improvement  Continue supportive measures as she is on  I spent 30 minutes of critical care time directly involved in patient care today              Tomás Randall, DO

## 2024-08-30 NOTE — PROGRESS NOTES
Subjective Data:  Patient remains unresponsive at this point. Hemodynamically stable.     Overnight Events:    No     Objective Data:  Last Recorded Vitals:  Vitals:    08/30/24 0900 08/30/24 0914 08/30/24 0926 08/30/24 1100   BP: (!) 170/113 (!) 170/113 (!) 158/102    BP Location:       Patient Position:       Pulse: 100 100 98    Resp: 25  20    Temp:    37 °C (98.6 °F)   TempSrc:    Temporal   SpO2:    96%   Weight:       Height:           Last Labs:  CBC - 8/30/2024:  4:41 AM  15.9 13.7 185    43.2      CMP - 8/30/2024:  4:41 AM  8.6 6.1 23 --- 0.4   4.9 3.4 60 93      PTT - No results in last year.  _   _ _     TROPHS   Date/Time Value Ref Range Status   08/27/2024 05:08 AM 87 0 - 13 ng/L Final   08/24/2024 04:39  0 - 13 ng/L Final   08/23/2024 03:57 AM 1,624 0 - 13 ng/L Final     HGBA1C   Date/Time Value Ref Range Status   12/15/2020 09:58 AM 5.1 4.0 - 5.6 % Final      Last I/O:  I/O last 3 completed shifts:  In: 2598 (27.8 mL/kg) [NG/GT:2598]  Out: 4100 (43.9 mL/kg) [Urine:4100 (1.2 mL/kg/hr)]  Weight: 93.4 kg     Past Cardiology Tests (Last 3 Years):  EKG:  ECG 12 lead 08/21/2024    Echo:  Transthoracic Echo (TTE) Limited 08/22/2024    Ejection Fractions:  EF   Date/Time Value Ref Range Status   08/22/2024 06:00 AM 40 %      Cath:  No results found for this or any previous visit from the past 1095 days.    Stress Test:  No results found for this or any previous visit from the past 1095 days.    Cardiac Imaging:  XR chest abdomen for OG NG placement 08/21/2024      Inpatient Medications:  Scheduled medications   Medication Dose Route Frequency    amLODIPine  5 mg nasogastric tube Daily    bisacodyl  10 mg rectal Daily    carvedilol  3.125 mg oral BID    famotidine  20 mg nasogastric tube Daily    heparin (porcine)  5,000 Units subcutaneous q8h [facility]    nystatin  1 Application Topical BID    oxygen   inhalation Continuous - Inhalation    perflutren protein A microsphere  0.5 mL intravenous Once in imaging     sulfur hexafluoride microsphr  2 mL intravenous Once in imaging     PRN medications   Medication    acetaminophen    Or    acetaminophen    Or    acetaminophen    magnesium hydroxide    ondansetron ODT    Or    ondansetron     Continuous Medications   Medication Dose Last Rate    dextrose 5%  50 mL/hr 50 mL/hr (08/30/24 0946)       Physical Exam:  General: Unconscious, intubated  Neck: supple; trachea midline; no masses; no JVD; intubated  Chest: clear breath sounds bilaterally; no wheezing; on Mechanical ventilation  Cardio: regular rhythm, S1S2 normal, no murmurs  Abdomen: Soft, non-tender, non-distension, no organomegaly  Extremities: no clubbing/cyanosis/edema     Assessment/Plan     Mrs. Fidelia Santiago is a 49 y.o. non-smoker female being consulted by the Cardiology team for troponin leak. Patient with prior medical history significant for bipolar disorder, dermatofibroma of the left arm, fibromyalgia, chronic pain, nonalcoholic fatty liver disease, and vitamin D deficiency. Patient intubated and cannot give reliable information at this point. History has been obtained from previous records. Per chart review,   she was brought by the EMS to the emergency room after being found unresponsive by her boyfriend. EMS noted that boyfriend was very aggressive on scene and placed into police custody. She was found unresponsive and emergently intubated by the EMS in the field. On presentation to the ER, blood pressure 118/101, respiratory rate 30, temperature 34.3, heart rate 92. Pertinent findings on blood workup; WBC 13,000, troponin 670 --> 885, potassium 7.4, creatinine 2.94, calcium 7.3, alkaline phosphatase 216, ALT 1750, AST 2674, creatinine kinase 1830 and lactic acid 2.9 ABG showed a pH of 7.10, pCO2 of 63 and pO2 of 80. U tox screen came back negative. Urinalysis came back grossly within normal limits. CT scan of the chest abdomen and pelvis showed bilateral lower lobe infiltrates compatible with  pneumonia presumably aspiration. There is also bilateral perinephric stranding. And constipation. EMS gave the patient on the way 4 doses of Narcan without improvement in consciousness. Patient was given in the emergency room IV fluids, ceftriaxone, azithromycin, DuoNebs, insulin, sodium bicarbonate. Patient was found to have a sepsis [leukocytosis, hypothermia, lactic acidosis] associated with acute kidney injury, hypercapnic respiratory failure with respiratory acidosis, hypocalcemia, transaminitis, rhabdomyolysis, and elevated troponin. All in the setting of an underlying possible aspiration pneumonia of gram-negative bacteria origin. She has been admitted for clinical compensation.     Assessment     # Troponin Elevation  - Trop 670 --> 885 --> 1,624 --> 713 --> 87.  - Troponin elevation is likely attributable to non-ischemic myocardial injury due to myocardial imbalance in oxygen supply/demand secondary to underlying rhabdomyolysis.   - Would suggest to continue current medical management per primary team.    - Hydration.  - Echo (08/22/2024):   1. The left ventricular systolic function is moderately decreased, with a visually estimated ejection fraction of 40%.   2. There is global hypokinesis of the left ventricle with minor regional variations.   3. Spectral Doppler shows a pseudonormal pattern of left ventricular diastolic filling.   4. There is reduced right ventricular systolic function  - Once patient is more stable, we can discuss options to rule out cardiac ischemia.     # Rhabdomyolysis / HERI  - Crea 3.4 --> 5.8 --> 3.15 --> 2.46  - Ph 7.15 --> 7.5  - ALT 1376 --> 296  - AST 1494 --> 64  - Lactate 2.4  - Would suggest to follow Nephrology recs.     # Aspirative Pneumonia  - Medically managed.  - Would suggest to titrate hydration according to clinical status.       This critically ill patient continues to be at-risk for clinically significant deterioration / failure due to the above mentioned  dysfunctional, unstable organ systems.  I have personally identified and managed all complex critical care issues to prevent aforementioned clinical deterioration.  Critical care time is spent at bedside and/or the immediate area and has included, but is not limited to, the review of diagnostic tests, labs, radiographs, serial assessments of hemodynamics, respiratory status, ventilatory management, and family updates.  Time spent in procedures and teaching are reported separately.     Critical care time: 50 minutes     Peripheral IV 08/21/24 20 G Left Antecubital (Active)   Site Assessment Clean;Dry;Intact 08/30/24 0600   Dressing Type Transparent 08/30/24 0600   Line Status Flushed 08/30/24 0600   Dressing Status Clean;Dry;Occlusive 08/30/24 0600   Number of days: 9       Peripheral IV 08/21/24 16 G Right Antecubital (Active)   Site Assessment Clean;Dry;Intact 08/30/24 0600   Dressing Type Transparent 08/30/24 0600   Line Status Flushed 08/30/24 0600   Dressing Status Clean;Dry;Occlusive 08/30/24 0600   Number of days: 9       ETT  6.5 mm (Active)   Secured at (cm) 22 cm 08/30/24 0820   Measured from Lips 08/30/24 0820   Secured Location Right 08/30/24 0820   Secured by Commercial tube felipe 08/30/24 0820   Site Condition Drainage (Comment) 08/30/24 0820   Number of days: 9       NG/OG/Feeding Tube OG - [place] sump 14 Fr Center mouth (Active)   Intake (mL) 158 mL 08/30/24 0800   Intake - Flush (mL) 0 mL 08/30/24 0800   Number of days: 9       Urethral Catheter Straight-tip 16 Fr. (Active)   Output (mL) 225 mL 08/30/24 0800   Number of days: 9     Code Status:  Full Code    Jason Berman MD  Cardiology

## 2024-08-30 NOTE — PROGRESS NOTES
"Fidelia Santiago is a 49 y.o. female on day 8 of admission presenting with No Principal Problem: There is no principal problem currently on the Problem List. Please update the Problem List and refresh..    Subjective   No meaningful signs of recovery neurologically  On vent       Objective     Physical Exam  General Appearance: AAO x 0  Skin: skin color pink, warm, and dry; no suspicious rashes or lesions  Eyes : Pupils dilated  ENT: mucous membranes pink and moist, ETT in feeding tube  Neck: normocephalic  Respiratory: lungs clear to auscultation anteriorly; no wheezing, rhonchi, or crackles.   Heart: regular rate and rhythm.  Tachycardia  Abdomen: Nondistended, positive bowel sounds x4, soft,  nontender  Extremities:  edema   Peripheral pulses: normal x4 extremities  Neuro: Responds to noxious painful stimuli with decerebrate posturing of right arm.  Gag reflex intact      Last Recorded Vitals  Blood pressure (!) 139/93, pulse 103, temperature 36.4 °C (97.5 °F), temperature source Temporal, resp. rate 20, height 1.626 m (5' 4\"), weight 93.4 kg (205 lb 14.6 oz), SpO2 97%.  Intake/Output last 3 Shifts:  I/O last 3 completed shifts:  In: 3098.7 (33.2 mL/kg) [I.V.:261.7 (2.8 mL/kg); NG/GT:2837]  Out: 3425 (36.7 mL/kg) [Urine:3425 (1 mL/kg/hr)]  Weight: 93.4 kg     Relevant Results  Scheduled medications  amLODIPine, 5 mg, nasogastric tube, Daily  bisacodyl, 10 mg, rectal, Daily  carvedilol, 3.125 mg, oral, BID  famotidine, 20 mg, nasogastric tube, Daily  heparin (porcine), 5,000 Units, subcutaneous, q8h [facility]  nystatin, 1 Application, Topical, BID  oxygen, , inhalation, Continuous - Inhalation  perflutren protein A microsphere, 0.5 mL, intravenous, Once in imaging  sulfur hexafluoride microsphr, 2 mL, intravenous, Once in imaging      Continuous medications     PRN medications  PRN medications: acetaminophen **OR** acetaminophen **OR** acetaminophen, magnesium hydroxide, ondansetron ODT **OR** ondansetron    Results " for orders placed or performed during the hospital encounter of 08/21/24 (from the past 24 hour(s))   Comprehensive Metabolic Panel   Result Value Ref Range    Glucose 112 (H) 74 - 99 mg/dL    Sodium 148 (H) 136 - 145 mmol/L    Potassium 4.4 3.5 - 5.3 mmol/L    Chloride 116 (H) 98 - 107 mmol/L    Bicarbonate 23 21 - 32 mmol/L    Anion Gap 13 10 - 20 mmol/L    Urea Nitrogen 48 (H) 6 - 23 mg/dL    Creatinine 2.46 (H) 0.50 - 1.05 mg/dL    eGFR 23 (L) >60 mL/min/1.73m*2    Calcium 8.6 8.6 - 10.3 mg/dL    Albumin 3.4 3.4 - 5.0 g/dL    Alkaline Phosphatase 93 33 - 110 U/L    Total Protein 6.1 (L) 6.4 - 8.2 g/dL    AST 23 9 - 39 U/L    Bilirubin, Total 0.4 0.0 - 1.2 mg/dL    ALT 60 (H) 7 - 45 U/L   CBC   Result Value Ref Range    WBC 15.9 (H) 4.4 - 11.3 x10*3/uL    nRBC 0.0 0.0 - 0.0 /100 WBCs    RBC 4.54 4.00 - 5.20 x10*6/uL    Hemoglobin 13.7 12.0 - 16.0 g/dL    Hematocrit 43.2 36.0 - 46.0 %    MCV 95 80 - 100 fL    MCH 30.2 26.0 - 34.0 pg    MCHC 31.7 (L) 32.0 - 36.0 g/dL    RDW 13.2 11.5 - 14.5 %    Platelets 185 150 - 450 x10*3/uL   POCT GLUCOSE   Result Value Ref Range    POCT Glucose 146 (H) 74 - 99 mg/dL            Assessment/Plan   Assessment & Plan         49-year-old female with history of  Bipolar disorder  Dermatofibroma of left arm  Fibromyalgia  Chronic pain  Known alcoholic fatty liver disease  Vitamin D deficiency  Who presented with  Unresponsiveness  Sepsis  HERI  Acute hypercapnic respiratory failure  Respiratory acidosis  Hypocalcemia  Transaminitis  Severe rhabdomyolysis  Elevated troponin  Aspiration pneumonia of gram-negative bacterial origin  Acute hypoxemic respiratory failure requiring intubation  Drug overdose  NSTEMI type II demand ischemia  Hypokalemia  Shock liver  Plan  Continue current vent settings  Patient not responding neurologically  Status post antibiotics completed  Continue to monitor BMP and renal function some improvement  Breathing treatments  Carvedilol and Norvasc for  hypertension  EEG with severe diffuse encephalopathy  Full code  Plan for possible tracheostomy and PEG next week  Then placement to LTAC per case management  Echo with LV 40%  Continue current medical treatment  Once medically stable to consider cardiac ischemia evaluation  Hydrate with tube feeding, increase water flushes and hydrate to optimize sodium level for hypernatremia  H2 blockers  Heparin for DVT prophylaxis  Tube feeds per nutrition  Tylenol for fever in case  Antiemetics  Supportive care symptomatic management  Replace electrolytes as deemed appropriate  Avoid sedatives  Follow mentation clinically                                                Bruno Valencia MD

## 2024-08-30 NOTE — CARE PLAN
The patient's goals for the shift include      The clinical goals for the shift include pt will maintain patent airway      Problem: Discharge Planning  Goal: Discharge to home or other facility with appropriate resources  Outcome: Progressing     Problem: Knowledge Deficit  Goal: Patient/family/caregiver demonstrates understanding of disease process, treatment plan, medications, and discharge instructions  Outcome: Progressing     Problem: Mechanical Ventilation  Goal: Ability to express needs and understand communication  Outcome: Progressing  Goal: Mobility/activity is maintained at optimum level for patient  Outcome: Progressing     Problem: Skin  Goal: Decreased wound size/increased tissue granulation at next dressing change  Outcome: Progressing  Goal: Participates in plan/prevention/treatment measures  Outcome: Progressing  Goal: Prevent/manage excess moisture  Outcome: Progressing  Goal: Prevent/minimize sheer/friction injuries  Outcome: Progressing  Goal: Promote/optimize nutrition  Outcome: Progressing  Goal: Promote skin healing  Outcome: Progressing     Problem: Fall/Injury  Goal: Not fall by end of shift  Outcome: Progressing  Goal: Verbalize understanding of personal risk factors for fall in the hospital  Outcome: Progressing  Goal: Verbalize understanding of risk factor reduction measures to prevent injury from fall in the home  Outcome: Progressing  Goal: Use assistive devices by end of the shift  Outcome: Progressing  Goal: Pace activities to prevent fatigue by end of the shift  Outcome: Progressing     Problem: Respiratory - Adult  Goal: Achieves optimal ventilation and oxygenation  Outcome: Progressing     Problem: Genitourinary - Adult  Goal: Absence of urinary retention  Outcome: Progressing  Goal: Urinary catheter remains patent  Outcome: Progressing     Problem: Metabolic/Fluid and Electrolytes - Adult  Goal: Electrolytes maintained within normal limits  Outcome: Progressing  Goal: Hemodynamic  stability and optimal renal function maintained  Outcome: Progressing  Goal: Glucose maintained within prescribed range  Outcome: Progressing     Problem: Respiratory  Goal: Clear secretions with interventions this shift  Outcome: Progressing  Goal: Minimize anxiety/maximize coping throughout shift  Outcome: Progressing  Goal: Minimal/no exertional discomfort or dyspnea this shift  Outcome: Progressing  Goal: No signs of respiratory distress (eg. Use of accessory muscles. Peds grunting)  Outcome: Progressing  Goal: Patent airway maintained this shift  Outcome: Progressing  Goal: Tolerate mechanical ventilation evidenced by VS/agitation level this shift  Outcome: Progressing  Goal: Tolerate pulmonary toileting this shift  Outcome: Progressing  Goal: Verbalize decreased shortness of breath this shift  Outcome: Progressing  Goal: Wean oxygen to maintain O2 saturation per order/standard this shift  Outcome: Progressing  Goal: Increase self care and/or family involvement in next 24 hours  Outcome: Progressing     Problem: Nutrition  Goal: Tube feed tolerance  Outcome: Progressing     Problem: Pain  Goal: Turns in bed with improved pain control throughout the shift  Outcome: Progressing

## 2024-08-31 LAB
ANION GAP SERPL CALC-SCNC: 12 MMOL/L (ref 10–20)
BUN SERPL-MCNC: 41 MG/DL (ref 6–23)
CALCIUM SERPL-MCNC: 8.8 MG/DL (ref 8.6–10.3)
CHLORIDE SERPL-SCNC: 114 MMOL/L (ref 98–107)
CO2 SERPL-SCNC: 23 MMOL/L (ref 21–32)
CREAT SERPL-MCNC: 1.88 MG/DL (ref 0.5–1.05)
EGFRCR SERPLBLD CKD-EPI 2021: 32 ML/MIN/1.73M*2
ERYTHROCYTE [DISTWIDTH] IN BLOOD BY AUTOMATED COUNT: 13.1 % (ref 11.5–14.5)
GLUCOSE BLD MANUAL STRIP-MCNC: 110 MG/DL (ref 74–99)
GLUCOSE BLD MANUAL STRIP-MCNC: 110 MG/DL (ref 74–99)
GLUCOSE BLD MANUAL STRIP-MCNC: 115 MG/DL (ref 74–99)
GLUCOSE BLD MANUAL STRIP-MCNC: 120 MG/DL (ref 74–99)
GLUCOSE BLD MANUAL STRIP-MCNC: 129 MG/DL (ref 74–99)
GLUCOSE SERPL-MCNC: 123 MG/DL (ref 74–99)
HCT VFR BLD AUTO: 41.1 % (ref 36–46)
HGB BLD-MCNC: 13.1 G/DL (ref 12–16)
MCH RBC QN AUTO: 30 PG (ref 26–34)
MCHC RBC AUTO-ENTMCNC: 31.9 G/DL (ref 32–36)
MCV RBC AUTO: 94 FL (ref 80–100)
NRBC BLD-RTO: 0 /100 WBCS (ref 0–0)
PLATELET # BLD AUTO: 203 X10*3/UL (ref 150–450)
POTASSIUM SERPL-SCNC: 4 MMOL/L (ref 3.5–5.3)
RBC # BLD AUTO: 4.37 X10*6/UL (ref 4–5.2)
SODIUM SERPL-SCNC: 145 MMOL/L (ref 136–145)
WBC # BLD AUTO: 17.4 X10*3/UL (ref 4.4–11.3)

## 2024-08-31 PROCEDURE — 2500000004 HC RX 250 GENERAL PHARMACY W/ HCPCS (ALT 636 FOR OP/ED): Performed by: INTERNAL MEDICINE

## 2024-08-31 PROCEDURE — 80048 BASIC METABOLIC PNL TOTAL CA: CPT | Performed by: INTERNAL MEDICINE

## 2024-08-31 PROCEDURE — 85027 COMPLETE CBC AUTOMATED: CPT | Performed by: INTERNAL MEDICINE

## 2024-08-31 PROCEDURE — 2500000001 HC RX 250 WO HCPCS SELF ADMINISTERED DRUGS (ALT 637 FOR MEDICARE OP)

## 2024-08-31 PROCEDURE — 36415 COLL VENOUS BLD VENIPUNCTURE: CPT | Performed by: INTERNAL MEDICINE

## 2024-08-31 PROCEDURE — 2500000005 HC RX 250 GENERAL PHARMACY W/O HCPCS: Performed by: INTERNAL MEDICINE

## 2024-08-31 PROCEDURE — 2500000001 HC RX 250 WO HCPCS SELF ADMINISTERED DRUGS (ALT 637 FOR MEDICARE OP): Performed by: HOSPITALIST

## 2024-08-31 PROCEDURE — 82947 ASSAY GLUCOSE BLOOD QUANT: CPT

## 2024-08-31 PROCEDURE — 2500000001 HC RX 250 WO HCPCS SELF ADMINISTERED DRUGS (ALT 637 FOR MEDICARE OP): Performed by: INTERNAL MEDICINE

## 2024-08-31 PROCEDURE — 2020000001 HC ICU ROOM DAILY

## 2024-08-31 PROCEDURE — 99291 CRITICAL CARE FIRST HOUR: CPT | Performed by: STUDENT IN AN ORGANIZED HEALTH CARE EDUCATION/TRAINING PROGRAM

## 2024-08-31 PROCEDURE — 94003 VENT MGMT INPAT SUBQ DAY: CPT

## 2024-08-31 PROCEDURE — 31720 CLEARANCE OF AIRWAYS: CPT

## 2024-08-31 RX ORDER — PETROLATUM 420 MG/G
OINTMENT TOPICAL EVERY 6 HOURS PRN
Status: DISCONTINUED | OUTPATIENT
Start: 2024-08-31 | End: 2024-08-31

## 2024-08-31 RX ORDER — LOPERAMIDE HYDROCHLORIDE 2 MG/1
2 CAPSULE ORAL 4 TIMES DAILY PRN
Status: DISCONTINUED | OUTPATIENT
Start: 2024-08-31 | End: 2024-09-07 | Stop reason: HOSPADM

## 2024-08-31 RX ORDER — ZINC OXIDE 20 G/100G
1 OINTMENT TOPICAL
Status: DISCONTINUED | OUTPATIENT
Start: 2024-08-31 | End: 2024-09-07 | Stop reason: HOSPADM

## 2024-08-31 ASSESSMENT — COGNITIVE AND FUNCTIONAL STATUS - GENERAL
TOILETING: TOTAL
DRESSING REGULAR LOWER BODY CLOTHING: TOTAL
MOVING TO AND FROM BED TO CHAIR: TOTAL
MOBILITY SCORE: 6
WALKING IN HOSPITAL ROOM: TOTAL
DAILY ACTIVITIY SCORE: 6
MOVING FROM LYING ON BACK TO SITTING ON SIDE OF FLAT BED WITH BEDRAILS: TOTAL
STANDING UP FROM CHAIR USING ARMS: TOTAL
CLIMB 3 TO 5 STEPS WITH RAILING: TOTAL
EATING MEALS: TOTAL
DRESSING REGULAR UPPER BODY CLOTHING: TOTAL
HELP NEEDED FOR BATHING: TOTAL
PERSONAL GROOMING: TOTAL
TURNING FROM BACK TO SIDE WHILE IN FLAT BAD: TOTAL

## 2024-08-31 ASSESSMENT — PAIN - FUNCTIONAL ASSESSMENT
PAIN_FUNCTIONAL_ASSESSMENT: CPOT (CRITICAL CARE PAIN OBSERVATION TOOL)

## 2024-08-31 NOTE — CARE PLAN
"The clinical goals for the shift include Pt will have decreased blood pressure and decreased redness around the groin and sacrum by the end of the shift    Pt's blood pressure remains elevated despite blood pressure medication. Redness is still present around groin and sacrum. Rectal tube has been effective in keeping stool off of skin. Pt unable to communicate and RN unable to assess her understanding. RN reoriented Pt throughout the shift.    Problem: Mechanical Ventilation  Goal: Ability to express needs and understand communication  8/31/2024 0732 by Cassie Agosto RN  Outcome: Not Progressing  8/30/2024 2147 by Cassie Agosto RN  Flowsheets (Taken 8/30/2024 2147)  Ability to express needs and understand communication: Assess and monitor patient's ability to understand information and communicate     Problem: Fall/Injury  Goal: Verbalize understanding of personal risk factors for fall in the hospital  Outcome: Not Progressing  Note: Goal not applicable to Pt. Charted as \"met\" to erase goal  Goal: Verbalize understanding of risk factor reduction measures to prevent injury from fall in the home  Outcome: Not Progressing  Note: Goal not applicable to Pt. Charted as \"met\" to erase goal     "

## 2024-08-31 NOTE — PROGRESS NOTES
Fidelia Santiago is a 49 y.o. female on day 9 of admission presenting with No Principal Problem: There is no principal problem currently on the Problem List. Please update the Problem List and refresh..      Subjective   Patient seen and examined at the bedside this morning  She is unchanged at this time  No acute events or issues noted         Objective     Last Recorded Vitals  /83   Pulse 97   Temp 36.6 °C (97.9 °F) (Temporal)   Resp 22   Wt 93.4 kg (205 lb 14.6 oz)   SpO2 96%   Intake/Output last 3 Shifts:    Intake/Output Summary (Last 24 hours) at 8/31/2024 1057  Last data filed at 8/31/2024 0700  Gross per 24 hour   Intake 2344.7 ml   Output 3305 ml   Net -960.3 ml       Admission Weight  Weight: 79.4 kg (175 lb) (08/21/24 2007)    Daily Weight  08/28/24 : 93.4 kg (205 lb 14.6 oz)    Image Results  EEG  IMPRESSION    Impression  This EEG is indicative severe diffuse encephalopathy. No seizures are recorded.    A full report will be scanned into the patient's chart at a later time.    This report has been interpreted and electronically signed by      Physical Exam  Constitutional:       Comments: Responds only to noxious stimuli   HENT:      Head: Normocephalic and atraumatic.      Right Ear: External ear normal.      Left Ear: External ear normal.      Nose: Nose normal.      Mouth/Throat:      Mouth: Mucous membranes are moist.      Pharynx: Oropharynx is clear.      Comments: Endotracheal and feeding tube in place  Eyes:      Comments: Pupils dilated.  She does not open eyes today even to noxious stimuli   Cardiovascular:      Rate and Rhythm: Regular rhythm. Tachycardia present.   Pulmonary:      Effort: Pulmonary effort is normal.      Breath sounds: Normal breath sounds.   Abdominal:      General: Abdomen is flat.      Palpations: Abdomen is soft.   Genitourinary:     Comments: Garcia catheter in place  Musculoskeletal:         General: Swelling present.      Right lower leg: Edema present.       Left lower leg: Edema present.   Skin:     General: Skin is warm and dry.   Neurological:      Comments: Positive Babinski  Does respond to noxious stimuli with decerebrate posturing of the right arm  Rotates head to the right arm as well  Not moving left at all even to noxious stimuli  Positive gag reflex  No eye response to stimuli         Relevant Results               Assessment/Plan   This patient currently has cardiac telemetry ordered; if you would like to modify or discontinue the telemetry order, click here to go to the orders activity to modify/discontinue the order.              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  Still no overt signs of an infectious process  Sodium has returned to normal with free water flushes  Continue tube feeds and free water flushes as she is on  Continue ventilatory support as she is on still breathing over the ventilator and still stable from that standpoint  Father and her daughter are still discussing plans but likely plan is for trach and PEG this coming week  Continue supportive measures otherwise as she is on  I spent 30 minutes of critical care time directly involved in patient care excluding billable procedures              Tomás Randall, DO

## 2024-09-01 VITALS
TEMPERATURE: 97.2 F | DIASTOLIC BLOOD PRESSURE: 85 MMHG | BODY MASS INDEX: 35.15 KG/M2 | WEIGHT: 205.91 LBS | HEART RATE: 83 BPM | SYSTOLIC BLOOD PRESSURE: 120 MMHG | OXYGEN SATURATION: 99 % | RESPIRATION RATE: 17 BRPM | HEIGHT: 64 IN

## 2024-09-01 LAB
ALBUMIN SERPL BCP-MCNC: 3.5 G/DL (ref 3.4–5)
ALP SERPL-CCNC: 89 U/L (ref 33–110)
ALT SERPL W P-5'-P-CCNC: 52 U/L (ref 7–45)
ANION GAP SERPL CALC-SCNC: 12 MMOL/L (ref 10–20)
AST SERPL W P-5'-P-CCNC: 25 U/L (ref 9–39)
BILIRUB SERPL-MCNC: 0.5 MG/DL (ref 0–1.2)
BUN SERPL-MCNC: 37 MG/DL (ref 6–23)
CALCIUM SERPL-MCNC: 8.9 MG/DL (ref 8.6–10.3)
CHLORIDE SERPL-SCNC: 113 MMOL/L (ref 98–107)
CO2 SERPL-SCNC: 23 MMOL/L (ref 21–32)
CREAT SERPL-MCNC: 1.5 MG/DL (ref 0.5–1.05)
EGFRCR SERPLBLD CKD-EPI 2021: 43 ML/MIN/1.73M*2
ERYTHROCYTE [DISTWIDTH] IN BLOOD BY AUTOMATED COUNT: 13 % (ref 11.5–14.5)
GLUCOSE BLD MANUAL STRIP-MCNC: 121 MG/DL (ref 74–99)
GLUCOSE BLD MANUAL STRIP-MCNC: 122 MG/DL (ref 74–99)
GLUCOSE BLD MANUAL STRIP-MCNC: 126 MG/DL (ref 74–99)
GLUCOSE SERPL-MCNC: 130 MG/DL (ref 74–99)
HCT VFR BLD AUTO: 40.6 % (ref 36–46)
HGB BLD-MCNC: 13.1 G/DL (ref 12–16)
MAGNESIUM SERPL-MCNC: 1.66 MG/DL (ref 1.6–2.4)
MCH RBC QN AUTO: 30.2 PG (ref 26–34)
MCHC RBC AUTO-ENTMCNC: 32.3 G/DL (ref 32–36)
MCV RBC AUTO: 94 FL (ref 80–100)
NRBC BLD-RTO: 0 /100 WBCS (ref 0–0)
PHOSPHATE SERPL-MCNC: 3.8 MG/DL (ref 2.5–4.9)
PLATELET # BLD AUTO: 223 X10*3/UL (ref 150–450)
POTASSIUM SERPL-SCNC: 4 MMOL/L (ref 3.5–5.3)
PROT SERPL-MCNC: 6.4 G/DL (ref 6.4–8.2)
RBC # BLD AUTO: 4.34 X10*6/UL (ref 4–5.2)
SODIUM SERPL-SCNC: 144 MMOL/L (ref 136–145)
WBC # BLD AUTO: 18.5 X10*3/UL (ref 4.4–11.3)

## 2024-09-01 PROCEDURE — 36415 COLL VENOUS BLD VENIPUNCTURE: CPT | Performed by: INTERNAL MEDICINE

## 2024-09-01 PROCEDURE — 94003 VENT MGMT INPAT SUBQ DAY: CPT

## 2024-09-01 PROCEDURE — 85027 COMPLETE CBC AUTOMATED: CPT | Performed by: INTERNAL MEDICINE

## 2024-09-01 PROCEDURE — 99291 CRITICAL CARE FIRST HOUR: CPT | Performed by: STUDENT IN AN ORGANIZED HEALTH CARE EDUCATION/TRAINING PROGRAM

## 2024-09-01 PROCEDURE — 84100 ASSAY OF PHOSPHORUS: CPT | Performed by: INTERNAL MEDICINE

## 2024-09-01 PROCEDURE — 2500000001 HC RX 250 WO HCPCS SELF ADMINISTERED DRUGS (ALT 637 FOR MEDICARE OP): Performed by: INTERNAL MEDICINE

## 2024-09-01 PROCEDURE — 82947 ASSAY GLUCOSE BLOOD QUANT: CPT

## 2024-09-01 PROCEDURE — 2020000001 HC ICU ROOM DAILY

## 2024-09-01 PROCEDURE — 2500000001 HC RX 250 WO HCPCS SELF ADMINISTERED DRUGS (ALT 637 FOR MEDICARE OP)

## 2024-09-01 PROCEDURE — 80053 COMPREHEN METABOLIC PANEL: CPT | Performed by: INTERNAL MEDICINE

## 2024-09-01 PROCEDURE — 83735 ASSAY OF MAGNESIUM: CPT | Performed by: INTERNAL MEDICINE

## 2024-09-01 PROCEDURE — 2500000005 HC RX 250 GENERAL PHARMACY W/O HCPCS: Performed by: INTERNAL MEDICINE

## 2024-09-01 PROCEDURE — 31720 CLEARANCE OF AIRWAYS: CPT

## 2024-09-01 PROCEDURE — 2500000004 HC RX 250 GENERAL PHARMACY W/ HCPCS (ALT 636 FOR OP/ED): Performed by: INTERNAL MEDICINE

## 2024-09-01 PROCEDURE — 94762 N-INVAS EAR/PLS OXIMTRY CONT: CPT

## 2024-09-01 PROCEDURE — 2500000001 HC RX 250 WO HCPCS SELF ADMINISTERED DRUGS (ALT 637 FOR MEDICARE OP): Performed by: HOSPITALIST

## 2024-09-01 ASSESSMENT — PAIN - FUNCTIONAL ASSESSMENT
PAIN_FUNCTIONAL_ASSESSMENT: CPOT (CRITICAL CARE PAIN OBSERVATION TOOL)

## 2024-09-01 NOTE — CARE PLAN
Problem: Discharge Planning  Goal: Discharge to home or other facility with appropriate resources  Outcome: Not Progressing     Problem: Knowledge Deficit  Goal: Patient/family/caregiver demonstrates understanding of disease process, treatment plan, medications, and discharge instructions  Outcome: Not Progressing     Problem: Mechanical Ventilation  Goal: Ability to express needs and understand communication  Outcome: Not Progressing  Goal: Mobility/activity is maintained at optimum level for patient  Outcome: Not Progressing  Goal: Patient Will Maintain Patent Airway  Outcome: Not Progressing  Goal: Oral health is maintained or improved  Outcome: Not Progressing  Goal: ET tube will be managed safely  Outcome: Not Progressing     Problem: Skin  Goal: Decreased wound size/increased tissue granulation at next dressing change  Outcome: Not Progressing  Goal: Participates in plan/prevention/treatment measures  Outcome: Not Progressing  Goal: Prevent/manage excess moisture  Outcome: Not Progressing  Goal: Prevent/minimize sheer/friction injuries  Outcome: Not Progressing  Goal: Promote/optimize nutrition  Outcome: Not Progressing  Goal: Promote skin healing  Outcome: Not Progressing     Problem: Fall/Injury  Goal: Not fall by end of shift  Outcome: Not Progressing  Goal: Verbalize understanding of personal risk factors for fall in the hospital  Outcome: Not Progressing  Goal: Verbalize understanding of risk factor reduction measures to prevent injury from fall in the home  Outcome: Not Progressing  Goal: Pace activities to prevent fatigue by end of the shift  Outcome: Not Progressing  Goal: Be free from injury by end of the shift  Outcome: Not Progressing     Problem: Respiratory - Adult  Goal: Achieves optimal ventilation and oxygenation  Outcome: Not Progressing     Problem: Genitourinary - Adult  Goal: Absence of urinary retention  Outcome: Not Progressing  Goal: Urinary catheter remains patent  Outcome: Not  Progressing     Problem: Metabolic/Fluid and Electrolytes - Adult  Goal: Electrolytes maintained within normal limits  Outcome: Not Progressing  Goal: Hemodynamic stability and optimal renal function maintained  Outcome: Not Progressing  Goal: Glucose maintained within prescribed range  Outcome: Not Progressing     Problem: Respiratory  Goal: Clear secretions with interventions this shift  Outcome: Not Progressing  Goal: Minimize anxiety/maximize coping throughout shift  Outcome: Not Progressing  Goal: Minimal/no exertional discomfort or dyspnea this shift  Outcome: Not Progressing  Goal: No signs of respiratory distress (eg. Use of accessory muscles. Peds grunting)  Outcome: Not Progressing  Goal: Patent airway maintained this shift  Outcome: Not Progressing  Goal: Tolerate mechanical ventilation evidenced by VS/agitation level this shift  Outcome: Not Progressing  Goal: Tolerate pulmonary toileting this shift  Outcome: Not Progressing  Goal: Wean oxygen to maintain O2 saturation per order/standard this shift  Outcome: Not Progressing  Goal: Increase self care and/or family involvement in next 24 hours  Outcome: Not Progressing     Problem: Nutrition  Goal: Tube feed tolerance  Outcome: Not Progressing     Problem: Pain  Goal: Turns in bed with improved pain control throughout the shift  Outcome: Not Progressing    Problem: Discharge Planning  Goal: Discharge to home or other facility with appropriate resources  Outcome: Not Progressing     Problem: Knowledge Deficit  Goal: Patient/family/caregiver demonstrates understanding of disease process, treatment plan, medications, and discharge instructions  Outcome: Not Progressing     Problem: Mechanical Ventilation  Goal: Ability to express needs and understand communication  Outcome: Not Progressing  Goal: Mobility/activity is maintained at optimum level for patient  Outcome: Not Progressing  Goal: Patient Will Maintain Patent Airway  Outcome: Not Progressing  Goal:  Oral health is maintained or improved  Outcome: Not Progressing  Goal: ET tube will be managed safely  Outcome: Not Progressing     Problem: Skin  Goal: Decreased wound size/increased tissue granulation at next dressing change  Outcome: Not Progressing  Goal: Participates in plan/prevention/treatment measures  Outcome: Not Progressing  Goal: Prevent/manage excess moisture  Outcome: Not Progressing  Goal: Prevent/minimize sheer/friction injuries  Outcome: Not Progressing  Goal: Promote/optimize nutrition  Outcome: Not Progressing  Goal: Promote skin healing  Outcome: Not Progressing     Problem: Fall/Injury  Goal: Not fall by end of shift  Outcome: Not Progressing  Goal: Verbalize understanding of personal risk factors for fall in the hospital  Outcome: Not Progressing  Goal: Verbalize understanding of risk factor reduction measures to prevent injury from fall in the home  Outcome: Not Progressing  Goal: Pace activities to prevent fatigue by end of the shift  Outcome: Not Progressing  Goal: Be free from injury by end of the shift  Outcome: Not Progressing     Problem: Respiratory - Adult  Goal: Achieves optimal ventilation and oxygenation  Outcome: Not Progressing     Problem: Genitourinary - Adult  Goal: Absence of urinary retention  Outcome: Not Progressing  Goal: Urinary catheter remains patent  Outcome: Not Progressing     Problem: Metabolic/Fluid and Electrolytes - Adult  Goal: Electrolytes maintained within normal limits  Outcome: Not Progressing  Goal: Hemodynamic stability and optimal renal function maintained  Outcome: Not Progressing  Goal: Glucose maintained within prescribed range  Outcome: Not Progressing     Problem: Respiratory  Goal: Clear secretions with interventions this shift  Outcome: Not Progressing  Goal: Minimize anxiety/maximize coping throughout shift  Outcome: Not Progressing  Goal: Minimal/no exertional discomfort or dyspnea this shift  Outcome: Not Progressing  Goal: No signs of  respiratory distress (eg. Use of accessory muscles. Peds grunting)  Outcome: Not Progressing  Goal: Patent airway maintained this shift  Outcome: Not Progressing  Goal: Tolerate mechanical ventilation evidenced by VS/agitation level this shift  Outcome: Not Progressing  Goal: Tolerate pulmonary toileting this shift  Outcome: Not Progressing  Goal: Wean oxygen to maintain O2 saturation per order/standard this shift  Outcome: Not Progressing  Goal: Increase self care and/or family involvement in next 24 hours  Outcome: Not Progressing     Problem: Nutrition  Goal: Tube feed tolerance  Outcome: Not Progressing     Problem: Pain  Goal: Turns in bed with improved pain control throughout the shift  Outcome: Not Progressing      Pt RASS -4. Plan is for trach and peg placement in next few days.

## 2024-09-01 NOTE — PROGRESS NOTES
Fidelia Santiago is a 49 y.o. female on day 10 of admission presenting with No Principal Problem: There is no principal problem currently on the Problem List. Please update the Problem List and refresh..      Subjective   Patient seen and examined bedside.  No responses.  Continues to be on spontaneous ventilation       Objective     Last Recorded Vitals  /88 (BP Location: Left arm, Patient Position: Lying)   Pulse 88   Temp 35.9 °C (96.6 °F) (Temporal)   Resp 13   Wt 93.4 kg (205 lb 14.6 oz)   SpO2 98%   Intake/Output last 3 Shifts:    Intake/Output Summary (Last 24 hours) at 9/1/2024 1538  Last data filed at 9/1/2024 1226  Gross per 24 hour   Intake 2227 ml   Output 3050 ml   Net -823 ml       Admission Weight  Weight: 79.4 kg (175 lb) (08/21/24 2007)    Daily Weight  08/28/24 : 93.4 kg (205 lb 14.6 oz)    Image Results  EEG  IMPRESSION    Impression  This EEG is indicative severe diffuse encephalopathy. No seizures are recorded.    A full report will be scanned into the patient's chart at a later time.    This report has been interpreted and electronically signed by      Physical Exam  Constitutional:       Comments: Ventilated on spontaneous respiration   HENT:      Head: Normocephalic and atraumatic.      Right Ear: Tympanic membrane normal.      Left Ear: Tympanic membrane normal.      Nose: Nose normal.      Mouth/Throat:      Mouth: Mucous membranes are moist.   Eyes:      Pupils: Pupils are equal, round, and reactive to light.   Cardiovascular:      Rate and Rhythm: Normal rate and regular rhythm.      Pulses: Normal pulses.      Heart sounds: Normal heart sounds.   Pulmonary:      Effort: Pulmonary effort is normal.      Breath sounds: Normal breath sounds.   Abdominal:      General: Abdomen is flat.      Palpations: Abdomen is soft.   Musculoskeletal:         General: Normal range of motion.      Comments: Stage I sacral ulcer on bilateral buttocks   Neurological:      General: No focal  deficit present.   Psychiatric:         Mood and Affect: Mood normal.         Relevant Results           Scheduled medications  amLODIPine, 5 mg, nasogastric tube, Daily  bisacodyl, 10 mg, rectal, Daily  carvedilol, 3.125 mg, oral, BID  famotidine, 20 mg, nasogastric tube, Daily  heparin (porcine), 5,000 Units, subcutaneous, q8h [facility]  nystatin, 1 Application, Topical, BID  oxygen, , inhalation, Continuous - Inhalation  perflutren protein A microsphere, 0.5 mL, intravenous, Once in imaging  sulfur hexafluoride microsphr, 2 mL, intravenous, Once in imaging      Continuous medications     PRN medications  PRN medications: acetaminophen **OR** acetaminophen **OR** acetaminophen, loperamide, magnesium hydroxide, ondansetron ODT **OR** ondansetron, zinc oxide        Assessment/Plan   This patient currently has cardiac telemetry ordered; if you would like to modify or discontinue the telemetry order, click here to go to the orders activity to modify/discontinue the order.      This patient has a urinary catheter   Reason for the urinary catheter remaining today?   This patient is intubated   Reason for patient to remain intubated today? we are providing ongoing neuro resuscitation        Assessment & Plan    49-year-old obese female with a past medical history of bipolar disorder, dermatofibroma of the left arm, fibromyalgia, chronic pain, nonalcoholic fatty liver disease, and vitamin D deficiency who was brought by the EMS to the emergency room after being found unresponsive by her boyfriend. EMS noted that boyfriend was very aggressive on scene and placed into police custody. In the ER, patient was found to have a sepsis [leukocytosis, hypothermia, lactic acidosis] associated with acute kidney injury, hypercapnic respiratory failure with respiratory acidosis, hypocalcemia, transaminitis, rhabdomyolysis, and elevated troponin. All in the setting of an underlying possible aspiration pneumonia of gram-negative bacteria  origin.      Assessment  Acute metabolic encephalopathy  Acute hypoxic aspiratory failure requiring intubation  Aspiration pneumonia  Drug overdose  NSTEMI type II likely secondary demand ischemia  Acute renal failure  Anuria  Rhabdomyolysis  Hypokalemia  Hypocalcemia  Shock liver  Leukocytosis    Plan  Patient continues to be an anoxic encephalopathy.  She has not woken up yet  She was hyponatremic and that has normalized  Continue current ongoing ICU care  Continue vent management per ICU  Continue tube feeds  Creatinine is stabilized and trending normal  Vitals are stable  Replete electrolytes as needed  She has a white count but no signs of infection  Plan for trach and PEG placement.  Plan for SNF placement  Social work to assist in discharge planning    DVT prophylaxis: Heparin  GI prophylaxis: Famotidine  Full code            Junaid Rosenberg MD

## 2024-09-01 NOTE — PROGRESS NOTES
Subjective Data:  Patient remains unresponsive at this point. Hemodynamically stable.     Overnight Events:    No     Objective Data:  Last Recorded Vitals:  Vitals:    08/31/24 1520 08/31/24 1618 08/31/24 1900 08/31/24 2000   BP: 131/86  142/89 144/90   BP Location: Left arm   Left arm   Patient Position: Lying   Lying   Pulse: 92  102 97   Resp: 17  25 16   Temp: 36.4 °C (97.5 °F)   36.2 °C (97.2 °F)   TempSrc: Temporal   Tympanic   SpO2: 95% 96% 98% 98%   Weight:       Height:           Last Labs:  CBC - 8/31/2024:  3:40 AM  17.4 13.1 203    41.1      CMP - 8/31/2024:  3:40 AM  8.8 6.1 23 --- 0.4   4.9 3.4 60 93      PTT - No results in last year.  _   _ _     TROPHS   Date/Time Value Ref Range Status   08/27/2024 05:08 AM 87 0 - 13 ng/L Final   08/24/2024 04:39  0 - 13 ng/L Final   08/23/2024 03:57 AM 1,624 0 - 13 ng/L Final     HGBA1C   Date/Time Value Ref Range Status   12/15/2020 09:58 AM 5.1 4.0 - 5.6 % Final      Last I/O:  I/O last 3 completed shifts:  In: 3458.7 (37 mL/kg) [I.V.:261.7 (2.8 mL/kg); NG/GT:3197]  Out: 5180 (55.5 mL/kg) [Urine:5005 (1.5 mL/kg/hr); Stool:175]  Weight: 93.4 kg     Past Cardiology Tests (Last 3 Years):  EKG:  ECG 12 lead 08/21/2024    Echo:  Transthoracic Echo (TTE) Limited 08/22/2024    Ejection Fractions:  EF   Date/Time Value Ref Range Status   08/22/2024 06:00 AM 40 %      Cath:  No results found for this or any previous visit from the past 1095 days.    Stress Test:  No results found for this or any previous visit from the past 1095 days.    Cardiac Imaging:  XR chest abdomen for OG NG placement 08/21/2024      Inpatient Medications:  Scheduled medications   Medication Dose Route Frequency    amLODIPine  5 mg nasogastric tube Daily    bisacodyl  10 mg rectal Daily    carvedilol  3.125 mg oral BID    famotidine  20 mg nasogastric tube Daily    heparin (porcine)  5,000 Units subcutaneous q8h [facility]    nystatin  1 Application Topical BID    oxygen   inhalation Continuous -  Inhalation    perflutren protein A microsphere  0.5 mL intravenous Once in imaging    sulfur hexafluoride microsphr  2 mL intravenous Once in imaging     PRN medications   Medication    acetaminophen    Or    acetaminophen    Or    acetaminophen    loperamide    magnesium hydroxide    ondansetron ODT    Or    ondansetron    zinc oxide     Continuous Medications   Medication Dose Last Rate       Physical Exam:  General: Unconscious, intubated  Neck: supple; trachea midline; no masses; no JVD; intubated  Chest: clear breath sounds bilaterally; no wheezing; on Mechanical ventilation  Cardio: regular rhythm, S1S2 normal, no murmurs  Abdomen: Soft, non-tender, non-distension, no organomegaly  Extremities: no clubbing/cyanosis/edema     Assessment/Plan     Mrs. Fidelia Santiago is a 49 y.o. non-smoker female being consulted by the Cardiology team for troponin leak. Patient with prior medical history significant for bipolar disorder, dermatofibroma of the left arm, fibromyalgia, chronic pain, nonalcoholic fatty liver disease, and vitamin D deficiency. Patient intubated and cannot give reliable information at this point. History has been obtained from previous records. Per chart review,   she was brought by the EMS to the emergency room after being found unresponsive by her boyfriend. EMS noted that boyfriend was very aggressive on scene and placed into police custody. She was found unresponsive and emergently intubated by the EMS in the field. On presentation to the ER, blood pressure 118/101, respiratory rate 30, temperature 34.3, heart rate 92. Pertinent findings on blood workup; WBC 13,000, troponin 670 --> 885, potassium 7.4, creatinine 2.94, calcium 7.3, alkaline phosphatase 216, ALT 1750, AST 2674, creatinine kinase 1830 and lactic acid 2.9 ABG showed a pH of 7.10, pCO2 of 63 and pO2 of 80. U tox screen came back negative. Urinalysis came back grossly within normal limits. CT scan of the chest abdomen and pelvis  showed bilateral lower lobe infiltrates compatible with pneumonia presumably aspiration. There is also bilateral perinephric stranding. And constipation. EMS gave the patient on the way 4 doses of Narcan without improvement in consciousness. Patient was given in the emergency room IV fluids, ceftriaxone, azithromycin, DuoNebs, insulin, sodium bicarbonate. Patient was found to have a sepsis [leukocytosis, hypothermia, lactic acidosis] associated with acute kidney injury, hypercapnic respiratory failure with respiratory acidosis, hypocalcemia, transaminitis, rhabdomyolysis, and elevated troponin. All in the setting of an underlying possible aspiration pneumonia of gram-negative bacteria origin. She has been admitted for clinical compensation.     Assessment     # Troponin Elevation  - Trop 670 --> 885 --> 1,624 --> 713 --> 87.  - Troponin elevation is likely attributable to non-ischemic myocardial injury due to myocardial imbalance in oxygen supply/demand secondary to underlying rhabdomyolysis.   - Would suggest to continue current medical management per primary team.    - Hydration.  - Echo (08/22/2024):   1. The left ventricular systolic function is moderately decreased, with a visually estimated ejection fraction of 40%.   2. There is global hypokinesis of the left ventricle with minor regional variations.   3. Spectral Doppler shows a pseudonormal pattern of left ventricular diastolic filling.   4. There is reduced right ventricular systolic function  - Once patient is more stable, we can discuss options to rule out cardiac ischemia.     # Rhabdomyolysis / HERI  - Crea 3.4 --> 5.8 --> 3.15 --> 2.46  - Ph 7.15 --> 7.5  - ALT 1376 --> 296  - AST 1494 --> 64  - Lactate 2.4  - Would suggest to follow Nephrology recs.     # Aspirative Pneumonia  - Medically managed.  - Would suggest to titrate hydration according to clinical status.       This critically ill patient continues to be at-risk for clinically significant  deterioration / failure due to the above mentioned dysfunctional, unstable organ systems.  I have personally identified and managed all complex critical care issues to prevent aforementioned clinical deterioration.  Critical care time is spent at bedside and/or the immediate area and has included, but is not limited to, the review of diagnostic tests, labs, radiographs, serial assessments of hemodynamics, respiratory status, ventilatory management, and family updates.  Time spent in procedures and teaching are reported separately.     Critical care time: 50 minutes     Peripheral IV 08/21/24 20 G Left Antecubital (Active)   Site Assessment Clean;Dry;Intact 08/31/24 1800   Dressing Type Transparent 08/31/24 1800   Line Status Saline locked 08/31/24 1800   Dressing Status Clean;Dry 08/31/24 1800   Number of days: 10       Peripheral IV 08/21/24 16 G Right Antecubital (Active)   Site Assessment Clean;Dry;Intact 08/31/24 1800   Dressing Type Transparent 08/31/24 1800   Line Status Saline locked 08/31/24 1800   Dressing Status Dry;Old drainage 08/31/24 1800   Number of days: 10       ETT  6.5 mm (Active)   Secured at (cm) 21 cm 08/31/24 1343   Measured from Lips 08/31/24 1343   Secured Location Center 08/31/24 1343   Secured by Commercial tube felipe 08/31/24 1343   Site Condition Dry 08/31/24 1343   Number of days: 10       NG/OG/Feeding Tube OG - Lee sump 14 Fr Center mouth (Active)   Site Assessment Clean;Dry;Intact 08/31/24 1517   Number of days: 10       Urethral Catheter Straight-tip 16 Fr. (Active)   Output (mL) 500 mL 08/31/24 1723   Number of days: 10       Rectal Tube/Fecal Management With balloon (Active)   Site Assessment Clean;Skin intact;Red 08/31/24 1600   Patency Intact 08/31/24 1600   Tube Management Alternate position of drainage bag;Reposition patient 08/31/24 1600   Cuff/Balloon Volume 45 mL 08/31/24 1600   Number of days: 1       Code Status:  Full Code    Jason Berman MD  Cardiology

## 2024-09-01 NOTE — PROGRESS NOTES
Subjective Data:  Patient remains unresponsive at this point. Hemodynamically stable.     Overnight Events:    No     Objective Data:  Last Recorded Vitals:  Vitals:    09/01/24 0800 09/01/24 0805 09/01/24 0900 09/01/24 1000   BP:   (!) 149/96 127/80   BP Location:   Left arm    Patient Position:   Lying    Pulse: 96  86 88   Resp: 19  15 13   Temp:       TempSrc:       SpO2:  95% 96% 97%   Weight:       Height:           Last Labs:  CBC - 9/1/2024:  3:19 AM  18.5 13.1 223    40.6      CMP - 9/1/2024:  3:19 AM  8.9 6.4 25 --- 0.5   3.8 3.5 52 89      PTT - No results in last year.  _   _ _     TROPHS   Date/Time Value Ref Range Status   08/27/2024 05:08 AM 87 0 - 13 ng/L Final   08/24/2024 04:39  0 - 13 ng/L Final   08/23/2024 03:57 AM 1,624 0 - 13 ng/L Final     HGBA1C   Date/Time Value Ref Range Status   12/15/2020 09:58 AM 5.1 4.0 - 5.6 % Final      Last I/O:  I/O last 3 completed shifts:  In: 3688 (39.5 mL/kg) [NG/GT:3688]  Out: 5455 (58.4 mL/kg) [Urine:5130 (1.5 mL/kg/hr); Stool:325]  Weight: 93.4 kg     Past Cardiology Tests (Last 3 Years):  EKG:  ECG 12 lead 08/21/2024    Echo:  Transthoracic Echo (TTE) Limited 08/22/2024    Ejection Fractions:  EF   Date/Time Value Ref Range Status   08/22/2024 06:00 AM 40 %      Cath:  No results found for this or any previous visit from the past 1095 days.    Stress Test:  No results found for this or any previous visit from the past 1095 days.    Cardiac Imaging:  XR chest abdomen for OG NG placement 08/21/2024      Inpatient Medications:  Scheduled medications   Medication Dose Route Frequency    amLODIPine  5 mg nasogastric tube Daily    bisacodyl  10 mg rectal Daily    carvedilol  3.125 mg oral BID    famotidine  20 mg nasogastric tube Daily    heparin (porcine)  5,000 Units subcutaneous q8h [facility]I    nystatin  1 Application Topical BID    oxygen   inhalation Continuous - Inhalation    perflutren protein A microsphere  0.5 mL intravenous Once in imaging    sulfur  hexafluoride microsphr  2 mL intravenous Once in imaging     PRN medications   Medication    acetaminophen    Or    acetaminophen    Or    acetaminophen    loperamide    magnesium hydroxide    ondansetron ODT    Or    ondansetron    zinc oxide     Continuous Medications   Medication Dose Last Rate       Physical Exam:  General: Unconscious, intubated  Neck: supple; trachea midline; no masses; no JVD; intubated  Chest: clear breath sounds bilaterally; no wheezing; on Mechanical ventilation  Cardio: regular rhythm, S1S2 normal, no murmurs  Abdomen: Soft, non-tender, non-distension, no organomegaly  Extremities: no clubbing/cyanosis/edema     Assessment/Plan     Mrs. Fidelia Santiago is a 49 y.o. non-smoker female being consulted by the Cardiology team for troponin leak. Patient with prior medical history significant for bipolar disorder, dermatofibroma of the left arm, fibromyalgia, chronic pain, nonalcoholic fatty liver disease, and vitamin D deficiency. Patient intubated and cannot give reliable information at this point. History has been obtained from previous records. Per chart review,   she was brought by the EMS to the emergency room after being found unresponsive by her boyfriend. EMS noted that boyfriend was very aggressive on scene and placed into police custody. She was found unresponsive and emergently intubated by the EMS in the field. On presentation to the ER, blood pressure 118/101, respiratory rate 30, temperature 34.3, heart rate 92. Pertinent findings on blood workup; WBC 13,000, troponin 670 --> 885, potassium 7.4, creatinine 2.94, calcium 7.3, alkaline phosphatase 216, ALT 1750, AST 2674, creatinine kinase 1830 and lactic acid 2.9 ABG showed a pH of 7.10, pCO2 of 63 and pO2 of 80. U tox screen came back negative. Urinalysis came back grossly within normal limits. CT scan of the chest abdomen and pelvis showed bilateral lower lobe infiltrates compatible with pneumonia presumably aspiration. There  is also bilateral perinephric stranding. And constipation. EMS gave the patient on the way 4 doses of Narcan without improvement in consciousness. Patient was given in the emergency room IV fluids, ceftriaxone, azithromycin, DuoNebs, insulin, sodium bicarbonate. Patient was found to have a sepsis [leukocytosis, hypothermia, lactic acidosis] associated with acute kidney injury, hypercapnic respiratory failure with respiratory acidosis, hypocalcemia, transaminitis, rhabdomyolysis, and elevated troponin. All in the setting of an underlying possible aspiration pneumonia of gram-negative bacteria origin. She has been admitted for clinical compensation.     Assessment     # Troponin Elevation  - Trop 670 --> 885 --> 1,624 --> 713 --> 87.  - Troponin elevation is likely attributable to non-ischemic myocardial injury due to myocardial imbalance in oxygen supply/demand secondary to underlying rhabdomyolysis.   - Would suggest to continue current medical management per primary team.    - Hydration.  - Echo (08/22/2024):   1. The left ventricular systolic function is moderately decreased, with a visually estimated ejection fraction of 40%.   2. There is global hypokinesis of the left ventricle with minor regional variations.   3. Spectral Doppler shows a pseudonormal pattern of left ventricular diastolic filling.   4. There is reduced right ventricular systolic function  - Once patient is more stable, we can discuss options to rule out cardiac ischemia.     # Rhabdomyolysis / HERI  - Crea 3.4 --> 5.8 --> 3.15 --> 2.46  - Ph 7.15 --> 7.5  - ALT 1376 --> 296  - AST 1494 --> 64  - Lactate 2.4  - Would suggest to follow Nephrology recs.     # Aspirative Pneumonia  - Medically managed.  - Would suggest to titrate hydration according to clinical status.       This critically ill patient continues to be at-risk for clinically significant deterioration / failure due to the above mentioned dysfunctional, unstable organ systems.  I have  personally identified and managed all complex critical care issues to prevent aforementioned clinical deterioration.  Critical care time is spent at bedside and/or the immediate area and has included, but is not limited to, the review of diagnostic tests, labs, radiographs, serial assessments of hemodynamics, respiratory status, ventilatory management, and family updates.  Time spent in procedures and teaching are reported separately.     Critical care time: 50 minutes    Peripheral IV 08/21/24 20 G Left Antecubital (Active)   Site Assessment Clean;Dry;Intact 09/01/24 0900   Dressing Status Clean;Dry 09/01/24 0900   Number of days: 11       Peripheral IV 08/21/24 16 G Right Antecubital (Active)   Site Assessment Clean;Dry;Intact 09/01/24 0900   Dressing Status Clean;Dry 09/01/24 0900   Number of days: 11       ETT  6.5 mm (Active)   Secured at (cm) 21 cm 09/01/24 1000   Measured from Lips 09/01/24 1000   Secured Location Center 09/01/24 1000   Secured by Commercial tube felipe 09/01/24 1000   Site Condition Dry 09/01/24 1000   Number of days: 11       NG/OG/Feeding Tube OG - [place] sump 14 Fr Center mouth (Active)   Intake (mL) 45 mL 09/01/24 1000   Intake - Flush (mL) 62 mL 09/01/24 1000   Number of days: 11       Urethral Catheter Straight-tip 16 Fr. (Active)   Output (mL) 600 mL 09/01/24 1030   Number of days: 11       Code Status:  Full Code    Jason Berman MD  Cardiology

## 2024-09-02 LAB
ANION GAP SERPL CALC-SCNC: 13 MMOL/L (ref 10–20)
BUN SERPL-MCNC: 34 MG/DL (ref 6–23)
CALCIUM SERPL-MCNC: 9.2 MG/DL (ref 8.6–10.3)
CHLORIDE SERPL-SCNC: 110 MMOL/L (ref 98–107)
CO2 SERPL-SCNC: 23 MMOL/L (ref 21–32)
CREAT SERPL-MCNC: 1.24 MG/DL (ref 0.5–1.05)
EGFRCR SERPLBLD CKD-EPI 2021: 53 ML/MIN/1.73M*2
ERYTHROCYTE [DISTWIDTH] IN BLOOD BY AUTOMATED COUNT: 12.9 % (ref 11.5–14.5)
GLUCOSE BLD MANUAL STRIP-MCNC: 106 MG/DL (ref 74–99)
GLUCOSE BLD MANUAL STRIP-MCNC: 107 MG/DL (ref 74–99)
GLUCOSE BLD MANUAL STRIP-MCNC: 112 MG/DL (ref 74–99)
GLUCOSE BLD MANUAL STRIP-MCNC: 123 MG/DL (ref 74–99)
GLUCOSE SERPL-MCNC: 111 MG/DL (ref 74–99)
HCT VFR BLD AUTO: 41.6 % (ref 36–46)
HGB BLD-MCNC: 13.5 G/DL (ref 12–16)
MCH RBC QN AUTO: 30 PG (ref 26–34)
MCHC RBC AUTO-ENTMCNC: 32.5 G/DL (ref 32–36)
MCV RBC AUTO: 92 FL (ref 80–100)
NRBC BLD-RTO: 0 /100 WBCS (ref 0–0)
PLATELET # BLD AUTO: 261 X10*3/UL (ref 150–450)
POTASSIUM SERPL-SCNC: 4.3 MMOL/L (ref 3.5–5.3)
RBC # BLD AUTO: 4.5 X10*6/UL (ref 4–5.2)
SODIUM SERPL-SCNC: 142 MMOL/L (ref 136–145)
WBC # BLD AUTO: 20.8 X10*3/UL (ref 4.4–11.3)

## 2024-09-02 PROCEDURE — 99291 CRITICAL CARE FIRST HOUR: CPT | Performed by: INTERNAL MEDICINE

## 2024-09-02 PROCEDURE — 31720 CLEARANCE OF AIRWAYS: CPT

## 2024-09-02 PROCEDURE — 2500000005 HC RX 250 GENERAL PHARMACY W/O HCPCS: Performed by: INTERNAL MEDICINE

## 2024-09-02 PROCEDURE — 82947 ASSAY GLUCOSE BLOOD QUANT: CPT

## 2024-09-02 PROCEDURE — 94681 O2 UPTK CO2 OUTP % O2 XTRC: CPT

## 2024-09-02 PROCEDURE — 2500000001 HC RX 250 WO HCPCS SELF ADMINISTERED DRUGS (ALT 637 FOR MEDICARE OP): Performed by: HOSPITALIST

## 2024-09-02 PROCEDURE — 85027 COMPLETE CBC AUTOMATED: CPT | Performed by: STUDENT IN AN ORGANIZED HEALTH CARE EDUCATION/TRAINING PROGRAM

## 2024-09-02 PROCEDURE — 2500000004 HC RX 250 GENERAL PHARMACY W/ HCPCS (ALT 636 FOR OP/ED): Performed by: INTERNAL MEDICINE

## 2024-09-02 PROCEDURE — 36415 COLL VENOUS BLD VENIPUNCTURE: CPT | Performed by: STUDENT IN AN ORGANIZED HEALTH CARE EDUCATION/TRAINING PROGRAM

## 2024-09-02 PROCEDURE — 2500000001 HC RX 250 WO HCPCS SELF ADMINISTERED DRUGS (ALT 637 FOR MEDICARE OP)

## 2024-09-02 PROCEDURE — 2500000001 HC RX 250 WO HCPCS SELF ADMINISTERED DRUGS (ALT 637 FOR MEDICARE OP): Performed by: INTERNAL MEDICINE

## 2024-09-02 PROCEDURE — 94762 N-INVAS EAR/PLS OXIMTRY CONT: CPT

## 2024-09-02 PROCEDURE — 99291 CRITICAL CARE FIRST HOUR: CPT | Performed by: STUDENT IN AN ORGANIZED HEALTH CARE EDUCATION/TRAINING PROGRAM

## 2024-09-02 PROCEDURE — 94003 VENT MGMT INPAT SUBQ DAY: CPT

## 2024-09-02 PROCEDURE — 2020000001 HC ICU ROOM DAILY

## 2024-09-02 PROCEDURE — 80048 BASIC METABOLIC PNL TOTAL CA: CPT | Performed by: STUDENT IN AN ORGANIZED HEALTH CARE EDUCATION/TRAINING PROGRAM

## 2024-09-02 ASSESSMENT — PAIN - FUNCTIONAL ASSESSMENT
PAIN_FUNCTIONAL_ASSESSMENT: CPOT (CRITICAL CARE PAIN OBSERVATION TOOL)

## 2024-09-02 ASSESSMENT — COGNITIVE AND FUNCTIONAL STATUS - GENERAL
MOBILITY SCORE: 6
STANDING UP FROM CHAIR USING ARMS: TOTAL
WALKING IN HOSPITAL ROOM: TOTAL
TURNING FROM BACK TO SIDE WHILE IN FLAT BAD: TOTAL
PERSONAL GROOMING: TOTAL
TOILETING: TOTAL
MOVING FROM LYING ON BACK TO SITTING ON SIDE OF FLAT BED WITH BEDRAILS: TOTAL
DRESSING REGULAR LOWER BODY CLOTHING: TOTAL
MOVING TO AND FROM BED TO CHAIR: TOTAL
DAILY ACTIVITIY SCORE: 6
HELP NEEDED FOR BATHING: TOTAL
EATING MEALS: TOTAL
CLIMB 3 TO 5 STEPS WITH RAILING: TOTAL
DRESSING REGULAR UPPER BODY CLOTHING: TOTAL

## 2024-09-02 NOTE — PROGRESS NOTES
You are just sleepy, every week mostly this was the other day this is Fidelia Santiago is a 49 y.o. female on day 11 of admission presenting with No Principal Problem: There is no principal problem currently on the Problem List. Please update the Problem List and refresh..      Subjective   Patient seen and examined bedside.  No responses.  Continues to be on spontaneous ventilation       Objective     Last Recorded Vitals  BP (!) 160/107   Pulse 101   Temp 36.4 °C (97.5 °F) (Temporal)   Resp 22   Wt 93.4 kg (205 lb 14.6 oz)   SpO2 96%   Intake/Output last 3 Shifts:    Intake/Output Summary (Last 24 hours) at 9/2/2024 0916  Last data filed at 9/2/2024 0800  Gross per 24 hour   Intake 1799 ml   Output 2775 ml   Net -976 ml       Admission Weight  Weight: 79.4 kg (175 lb) (08/21/24 2007)    Daily Weight  08/28/24 : 93.4 kg (205 lb 14.6 oz)    Image Results  EEG  IMPRESSION    Impression  This EEG is indicative severe diffuse encephalopathy. No seizures are recorded.    A full report will be scanned into the patient's chart at a later time.    This report has been interpreted and electronically signed by      Physical Exam  Constitutional:       Comments: Ventilated on spontaneous respiration   HENT:      Head: Normocephalic and atraumatic.      Right Ear: Tympanic membrane normal.      Left Ear: Tympanic membrane normal.      Nose: Nose normal.      Mouth/Throat:      Mouth: Mucous membranes are moist.   Eyes:      Pupils: Pupils are equal, round, and reactive to light.   Cardiovascular:      Rate and Rhythm: Normal rate and regular rhythm.      Pulses: Normal pulses.      Heart sounds: Normal heart sounds.   Pulmonary:      Effort: Pulmonary effort is normal.      Breath sounds: Normal breath sounds.   Abdominal:      General: Abdomen is flat.      Palpations: Abdomen is soft.   Musculoskeletal:         General: Normal range of motion.      Comments: Stage I sacral ulcer on bilateral buttocks   Neurological:       General: No focal deficit present.   Psychiatric:         Mood and Affect: Mood normal.         Relevant Results           Scheduled medications  amLODIPine, 5 mg, nasogastric tube, Daily  bisacodyl, 10 mg, rectal, Daily  carvedilol, 3.125 mg, oral, BID  famotidine, 20 mg, nasogastric tube, Daily  heparin (porcine), 5,000 Units, subcutaneous, q8h [facility]  nystatin, 1 Application, Topical, BID  oxygen, , inhalation, Continuous - Inhalation  perflutren protein A microsphere, 0.5 mL, intravenous, Once in imaging  sulfur hexafluoride microsphr, 2 mL, intravenous, Once in imaging      Continuous medications     PRN medications  PRN medications: acetaminophen **OR** acetaminophen **OR** acetaminophen, loperamide, magnesium hydroxide, ondansetron ODT **OR** ondansetron, zinc oxide        Assessment/Plan   This patient currently has cardiac telemetry ordered; if you would like to modify or discontinue the telemetry order, click here to go to the orders activity to modify/discontinue the order.      This patient has a urinary catheter   Reason for the urinary catheter remaining today?   This patient is intubated   Reason for patient to remain intubated today? we are providing ongoing neuro resuscitation        Assessment & Plan    49-year-old obese female with a past medical history of bipolar disorder, dermatofibroma of the left arm, fibromyalgia, chronic pain, nonalcoholic fatty liver disease, and vitamin D deficiency who was brought by the EMS to the emergency room after being found unresponsive by her boyfriend. EMS noted that boyfriend was very aggressive on scene and placed into police custody. In the ER, patient was found to have a sepsis [leukocytosis, hypothermia, lactic acidosis] associated with acute kidney injury, hypercapnic respiratory failure with respiratory acidosis, hypocalcemia, transaminitis, rhabdomyolysis, and elevated troponin. All in the setting of an underlying possible aspiration pneumonia of  gram-negative bacteria origin.      Assessment  Acute metabolic encephalopathy  Acute hypoxic aspiratory failure requiring intubation  Aspiration pneumonia  Drug overdose  NSTEMI type II likely secondary demand ischemia  Acute renal failure  Anuria  Rhabdomyolysis  Hypokalemia  Hypocalcemia  Shock liver  Leukocytosis  Sacral ulcer    Plan  Patient continues to be an anoxic encephalopathy.  She has not woken up yet  She was hyponatremic and that has normalized  Continue current ongoing ICU care  Continue vent management per ICU  Continue tube feeds  Creatinine is stabilized and trending normal  Vitals are stable  Replete electrolytes as needed  She has a white count but no signs of infection  Plan for trach and PEG placement.  On Wednesday  Frequent repositioning  Garcia care  Plan for SNF placement  Social work to assist in discharge planning    DVT prophylaxis: Heparin  GI prophylaxis: Famotidine  Full code            Junaid Rosenberg MD

## 2024-09-02 NOTE — PROGRESS NOTES
Fidelia Santiago is a 49 y.o. female on day 11 of admission presenting with No Principal Problem: There is no principal problem currently on the Problem List. Please update the Problem List and refresh..      Subjective   Patient seen and examined at the bedside this morning  No acute events or issues noted at this time  No major changes noted       Objective          Vitals 24HR  Heart Rate:  []   Temp:  [35.9 °C (96.6 °F)-36.4 °C (97.5 °F)]   Resp:  [13-24]   BP: (120-170)/()   SpO2:  [91 %-99 %]     Emergency going    Intake/Output last 3 Shifts:    Intake/Output Summary (Last 24 hours) at 9/2/2024 0910  Last data filed at 9/2/2024 0800  Gross per 24 hour   Intake 1799 ml   Output 2775 ml   Net -976 ml       Physical Exam  Constitutional:       Comments: Responds only to noxious stimuli   HENT:      Head: Normocephalic and atraumatic.      Right Ear: External ear normal.      Left Ear: External ear normal.      Nose: Nose normal.      Mouth/Throat:      Mouth: Mucous membranes are moist.      Pharynx: Oropharynx is clear.      Comments: Endotracheal and feeding tube in place  Eyes:      Comments: Pupils dilated.  She does not open eyes today even to noxious stimuli   Cardiovascular:      Rate and Rhythm: Regular rhythm. Tachycardia present.   Pulmonary:      Effort: Pulmonary effort is normal.      Breath sounds: Normal breath sounds.   Abdominal:      General: Abdomen is flat.      Palpations: Abdomen is soft.   Genitourinary:     Comments: Garcia catheter in place  Musculoskeletal:         General: Swelling present.      Right lower leg: Edema present.      Left lower leg: Edema present.   Skin:     General: Skin is warm and dry.   Neurological:      Comments: Positive Babinski  Does respond to noxious stimuli with decerebrate posturing of the right arm  Rotates head to the right arm as well  Not moving left at all even to noxious stimuli  Positive gag reflex  No eye response to stimuli        Scheduled medications  amLODIPine, 5 mg, nasogastric tube, Daily  bisacodyl, 10 mg, rectal, Daily  carvedilol, 3.125 mg, oral, BID  famotidine, 20 mg, nasogastric tube, Daily  heparin (porcine), 5,000 Units, subcutaneous, q8h [facility]  nystatin, 1 Application, Topical, BID  oxygen, , inhalation, Continuous - Inhalation  perflutren protein A microsphere, 0.5 mL, intravenous, Once in imaging  sulfur hexafluoride microsphr, 2 mL, intravenous, Once in imaging      Continuous medications     PRN medications  PRN medications: acetaminophen **OR** acetaminophen **OR** acetaminophen, loperamide, magnesium hydroxide, ondansetron ODT **OR** ondansetron, zinc oxide    Relevant Results               Assessment/Plan   This patient currently has cardiac telemetry ordered; if you would like to modify or discontinue the telemetry order, click here to go to the orders activity to modify/discontinue the order.    This patient has a urinary catheter   Reason for the urinary catheter remaining today? 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

## 2024-09-02 NOTE — CONSULTS
"Nutrition Follow Up Note  Patient has Malnutrition Diagnosis:  (insufficient data)  Nutrition Assessment         9/2 Pt seen and examined at bedside. No new changes. No residuals. Pt tolerating TF and formula. Wt trending upwards, 20 lbs since 8/22. Significant gain. Likely fluid. Pt with peg and trach placement tomorrow then likely discharging to ltc facility. Continue to monitor through pts discharge.       Nutrition History:  Food and Nutrient History: Unable to assess nutrition status at this time     No Known Allergies   GI Symptoms: None     Oral Problems: None      Anthropometrics:  Height: 162.6 cm (5' 4\")   Weight: 93.4 kg (205 lb 14.6 oz)   BMI (Calculated): 35.33  IBW/kg (Dietitian Calculated): 54.5 kg          Weight History:     Weight         8/22/2024  0025 8/25/2024  1900 8/27/2024  0552 8/28/2024  0617 8/28/2024  0700    Weight: 84 kg (185 lb 3 oz) 91.4 kg (201 lb 8 oz) 95.3 kg (210 lb 1.6 oz) 93.4 kg (205 lb 14.6 oz) 93.4 kg (205 lb 14.6 oz)              Nutrition Significant Labs:    Results from last 7 days   Lab Units 09/02/24  0401 09/01/24  0319 08/31/24  0340 08/28/24  0353 08/27/24  0508   GLUCOSE mg/dL 111* 130* 123*   < > 118*   SODIUM mmol/L 142 144 145   < > 145   POTASSIUM mmol/L 4.3 4.0 4.0   < > 4.7   CHLORIDE mmol/L 110* 113* 114*   < > 112*   CO2 mmol/L 23 23 23   < > 24   BUN mg/dL 34* 37* 41*   < > 57*   CREATININE mg/dL 1.24* 1.50* 1.88*   < > 4.72*   EGFR mL/min/1.73m*2 53* 43* 32*   < > 11*   CALCIUM mg/dL 9.2 8.9 8.8   < > 8.2*   PHOSPHORUS mg/dL  --  3.8  --   --   --    MAGNESIUM mg/dL  --  1.66  --   --  1.98    < > = values in this interval not displayed.     Lab Results   Component Value Date    HGBA1C 5.1 12/15/2020     Results from last 7 days   Lab Units 09/02/24  0533 09/01/24  2346 09/01/24  1807 09/01/24  1115 08/31/24  2322 08/31/24  1750 08/31/24  1111 08/31/24  0608   POCT GLUCOSE mg/dL 123* 121* 122* 126* 120* 110* 129* 110*     Lab Results   Component Value " "Date    ALBUMIN 3.5 09/01/2024      No results found for: \"CRP\"        Nutrition Specific Medications:   Scheduled medications:  amLODIPine, 5 mg, nasogastric tube, Daily  bisacodyl, 10 mg, rectal, Daily  carvedilol, 3.125 mg, oral, BID  famotidine, 20 mg, nasogastric tube, Daily  heparin (porcine), 5,000 Units, subcutaneous, q8h [facility]  nystatin, 1 Application, Topical, BID  oxygen, , inhalation, Continuous - Inhalation  perflutren protein A microsphere, 0.5 mL, intravenous, Once in imaging  sulfur hexafluoride microsphr, 2 mL, intravenous, Once in imaging      Continuous medications:     PRN medications:  PRN medications: acetaminophen **OR** acetaminophen **OR** acetaminophen, loperamide, magnesium hydroxide, ondansetron ODT **OR** ondansetron, zinc oxide     Nursing Data Per flowsheet:   Stool Appearance: Loose (09/02/24 0801)  Gastrointestinal  Gastrointestinal (WDL): Exceptions to WDL  Abdomen Inspection: Soft, Nondistended, Rounded  Abdominal Tenderness: Soft  Bowel Sounds: All quadrants  Bowel Sounds (All Quadrants): Active  Passing Flatus: Yes  Last BM Date: 09/02/24  Bowel Incontinence: Yes  Stool Appearance: Loose  Stool Color: Tan  Gastrointestinal Symptoms: Diarrhea  Feeding assistance level: Completely dependent    Intake/Output Summary (Last 24 hours) at 9/2/2024 1140  Last data filed at 9/2/2024 0800  Gross per 24 hour   Intake 1692 ml   Output 2175 ml   Net -483 ml      Critical-Care Pain Observation Score:  [1-3]    Dietary Orders (From admission, onward)       Start     Ordered    08/29/24 0918  Enteral feeding with NPO 45 (Start @ 25 mL/hr and increase as tolerated by 10 mL/every 4 hours); 250; Every 4 hours  Diet effective now        Question Answer Comment   Tube feeding formula: Vital 1.5    Tube feeding continuous rate (mL/hr): 45 Start @ 25 mL/hr and increase as tolerated by 10 mL/every 4 hours   Tube feeding flush (mL): 250    Flush frequency: Every 4 hours        08/29/24 0918              "        Estimated Needs:   Total Energy Estimated Needs (kCal): 1635 kCal  Method for Estimating Needs: 30 kcal/kg  Total Protein Estimated Needs (g): 65 g  Method for Estimating Needs: 1.2 g/kg  Total Fluid Estimated Needs (mL):  (other)  Method for Estimating Needs: per MD or 35 ml/kg, 1900 mL       Nutrition Diagnosis   Malnutrition Diagnosis  Patient has Malnutrition Diagnosis:  (insufficient data)    Nutrition Diagnosis  Patient has Nutrition Diagnosis: Yes  Diagnosis Status (1): Ongoing  Nutrition Diagnosis 1: Inadequate oral intake  Related to (1): mechanical vent  As Evidenced by (1): npo status, need for enteral nutrition       Nutrition Interventions/Recommendations   Nutrition Prescription:  Suggest Vital 1.5 @ 45 mL/hr. Start @ 25 mL/hr and increase as tolerated by 10 mL/hr every 4 hours; TF provides: 1080 mL volume, 1620 calories, 72 grams protein, 825 mL free water. Water flushes per MD or 150 mL every 4 hours    Nutrition Interventions:   Interventions: Enteral intake  Enteral Intake: Other (Comment)  Goal: iniate TF, tolerate TF within 48 hours    Collaboration and Referral of Nutrition Care: Collaboration by nutrition professional with other providers  Goal: Discussed wtih MD, RN      Recommendations:  Continue to monitor wt status, 20 lb weight gain since 8/22, fluid related.   Continue to monitor TF formula, goal of no residuals and tolerating feed.         Nutrition Monitoring and Evaluation   Food/Nutrient Related History Monitoring  Monitoring and Evaluation Plan: Energy intake  Energy Intake: Estimated energy intake  Criteria: meet >75% of estimated needs    Body Composition/Growth/Weight History  Monitoring and Evaluation Plan: Weight  Weight: Measured weight  Criteria: stable    Biochemical Data, Medical Tests and Procedures  Monitoring and Evaluation Plan: Electrolyte/renal panel  Electrolyte and Renal Panel: Potassium  Criteria: wnl

## 2024-09-02 NOTE — CARE PLAN
Problem: Discharge Planning  Goal: Discharge to home or other facility with appropriate resources  Outcome: Not Progressing     Problem: Knowledge Deficit  Goal: Patient/family/caregiver demonstrates understanding of disease process, treatment plan, medications, and discharge instructions  Outcome: Not Progressing     Problem: Mechanical Ventilation  Goal: Ability to express needs and understand communication  Outcome: Not Progressing  Goal: Mobility/activity is maintained at optimum level for patient  Outcome: Not Progressing  Goal: Patient Will Maintain Patent Airway  Outcome: Not Progressing  Goal: Oral health is maintained or improved  Outcome: Not Progressing  Goal: ET tube will be managed safely  Outcome: Not Progressing     Problem: Skin  Goal: Decreased wound size/increased tissue granulation at next dressing change  Outcome: Not Progressing  Goal: Participates in plan/prevention/treatment measures  Outcome: Not Progressing  Goal: Prevent/manage excess moisture  Outcome: Not Progressing  Goal: Prevent/minimize sheer/friction injuries  Outcome: Not Progressing  Goal: Promote/optimize nutrition  Outcome: Not Progressing  Goal: Promote skin healing  Outcome: Not Progressing     Problem: Fall/Injury  Goal: Not fall by end of shift  Outcome: Not Progressing  Goal: Verbalize understanding of personal risk factors for fall in the hospital  Outcome: Not Progressing  Goal: Verbalize understanding of risk factor reduction measures to prevent injury from fall in the home  Outcome: Not Progressing  Goal: Pace activities to prevent fatigue by end of the shift  Outcome: Not Progressing  Goal: Be free from injury by end of the shift  Outcome: Not Progressing     Problem: Respiratory - Adult  Goal: Achieves optimal ventilation and oxygenation  Outcome: Not Progressing     Problem: Genitourinary - Adult  Goal: Absence of urinary retention  Outcome: Not Progressing  Goal: Urinary catheter remains patent  Outcome: Not  Progressing     Problem: Metabolic/Fluid and Electrolytes - Adult  Goal: Electrolytes maintained within normal limits  Outcome: Not Progressing  Goal: Hemodynamic stability and optimal renal function maintained  Outcome: Not Progressing  Goal: Glucose maintained within prescribed range  Outcome: Not Progressing     Problem: Respiratory  Goal: Clear secretions with interventions this shift  Outcome: Not Progressing  Goal: Minimize anxiety/maximize coping throughout shift  Outcome: Not Progressing  Goal: Minimal/no exertional discomfort or dyspnea this shift  Outcome: Not Progressing  Goal: No signs of respiratory distress (eg. Use of accessory muscles. Peds grunting)  Outcome: Not Progressing  Goal: Patent airway maintained this shift  Outcome: Not Progressing  Goal: Tolerate mechanical ventilation evidenced by VS/agitation level this shift  Outcome: Not Progressing  Goal: Tolerate pulmonary toileting this shift  Outcome: Not Progressing  Goal: Wean oxygen to maintain O2 saturation per order/standard this shift  Outcome: Not Progressing  Goal: Increase self care and/or family involvement in next 24 hours  Outcome: Not Progressing     Problem: Nutrition  Goal: Tube feed tolerance  Outcome: Not Progressing     Problem: Pain  Goal: Turns in bed with improved pain control throughout the shift  Outcome: Not Progressing   The patient's goals for the shift include        Problem: Discharge Planning  Goal: Discharge to home or other facility with appropriate resources  Outcome: Not Progressing     Problem: Knowledge Deficit  Goal: Patient/family/caregiver demonstrates understanding of disease process, treatment plan, medications, and discharge instructions  Outcome: Not Progressing     Problem: Mechanical Ventilation  Goal: Ability to express needs and understand communication  Outcome: Not Progressing  Goal: Mobility/activity is maintained at optimum level for patient  Outcome: Not Progressing  Goal: Patient Will Maintain  Patent Airway  Outcome: Not Progressing  Goal: Oral health is maintained or improved  Outcome: Not Progressing  Goal: ET tube will be managed safely  Outcome: Not Progressing     Problem: Skin  Goal: Decreased wound size/increased tissue granulation at next dressing change  Outcome: Not Progressing  Goal: Participates in plan/prevention/treatment measures  Outcome: Not Progressing  Goal: Prevent/manage excess moisture  Outcome: Not Progressing  Goal: Prevent/minimize sheer/friction injuries  Outcome: Not Progressing  Goal: Promote/optimize nutrition  Outcome: Not Progressing  Goal: Promote skin healing  Outcome: Not Progressing     Problem: Fall/Injury  Goal: Not fall by end of shift  Outcome: Not Progressing  Goal: Verbalize understanding of personal risk factors for fall in the hospital  Outcome: Not Progressing  Goal: Verbalize understanding of risk factor reduction measures to prevent injury from fall in the home  Outcome: Not Progressing  Goal: Pace activities to prevent fatigue by end of the shift  Outcome: Not Progressing  Goal: Be free from injury by end of the shift  Outcome: Not Progressing     Problem: Respiratory - Adult  Goal: Achieves optimal ventilation and oxygenation  Outcome: Not Progressing     Problem: Genitourinary - Adult  Goal: Absence of urinary retention  Outcome: Not Progressing  Goal: Urinary catheter remains patent  Outcome: Not Progressing     Problem: Metabolic/Fluid and Electrolytes - Adult  Goal: Electrolytes maintained within normal limits  Outcome: Not Progressing  Goal: Hemodynamic stability and optimal renal function maintained  Outcome: Not Progressing  Goal: Glucose maintained within prescribed range  Outcome: Not Progressing     Problem: Respiratory  Goal: Clear secretions with interventions this shift  Outcome: Not Progressing  Goal: Minimize anxiety/maximize coping throughout shift  Outcome: Not Progressing  Goal: Minimal/no exertional discomfort or dyspnea this  shift  Outcome: Not Progressing  Goal: No signs of respiratory distress (eg. Use of accessory muscles. Peds grunting)  Outcome: Not Progressing  Goal: Patent airway maintained this shift  Outcome: Not Progressing  Goal: Tolerate mechanical ventilation evidenced by VS/agitation level this shift  Outcome: Not Progressing  Goal: Tolerate pulmonary toileting this shift  Outcome: Not Progressing  Goal: Wean oxygen to maintain O2 saturation per order/standard this shift  Outcome: Not Progressing  Goal: Increase self care and/or family involvement in next 24 hours  Outcome: Not Progressing     Problem: Nutrition  Goal: Tube feed tolerance  Outcome: Not Progressing     Problem: Pain  Goal: Turns in bed with improved pain control throughout the shift  Outcome: Not Progressing    The clinical goals for the shift include will have decrease Diarrhea throughout shift      Pt has had 1 loose stool so far this shift. Plan for trach and peg placement on Wednesday.

## 2024-09-03 ENCOUNTER — APPOINTMENT (OUTPATIENT)
Dept: RADIOLOGY | Facility: HOSPITAL | Age: 49
End: 2024-09-03
Payer: COMMERCIAL

## 2024-09-03 LAB
ALBUMIN SERPL BCP-MCNC: 3.7 G/DL (ref 3.4–5)
ALP SERPL-CCNC: 101 U/L (ref 33–110)
ALT SERPL W P-5'-P-CCNC: 45 U/L (ref 7–45)
ANION GAP SERPL CALC-SCNC: 14 MMOL/L (ref 10–20)
AST SERPL W P-5'-P-CCNC: 23 U/L (ref 9–39)
BASE EXCESS BLDA CALC-SCNC: -1.1 MMOL/L (ref -2–3)
BILIRUB SERPL-MCNC: 0.6 MG/DL (ref 0–1.2)
BODY TEMPERATURE: 37 DEGREES CELSIUS
BUN SERPL-MCNC: 37 MG/DL (ref 6–23)
CALCIUM SERPL-MCNC: 9.3 MG/DL (ref 8.6–10.3)
CHLORIDE SERPL-SCNC: 106 MMOL/L (ref 98–107)
CO2 SERPL-SCNC: 22 MMOL/L (ref 21–32)
CREAT SERPL-MCNC: 1.01 MG/DL (ref 0.5–1.05)
EGFRCR SERPLBLD CKD-EPI 2021: 68 ML/MIN/1.73M*2
ERYTHROCYTE [DISTWIDTH] IN BLOOD BY AUTOMATED COUNT: 12.7 % (ref 11.5–14.5)
GLUCOSE BLD MANUAL STRIP-MCNC: 97 MG/DL (ref 74–99)
GLUCOSE SERPL-MCNC: 112 MG/DL (ref 74–99)
HCO3 BLDA-SCNC: 22.4 MMOL/L (ref 22–26)
HCT VFR BLD AUTO: 40.5 % (ref 36–46)
HGB BLD-MCNC: 13.3 G/DL (ref 12–16)
INHALED O2 CONCENTRATION: 28 %
LACTATE SERPL-SCNC: 0.5 MMOL/L (ref 0.4–2)
MCH RBC QN AUTO: 30.1 PG (ref 26–34)
MCHC RBC AUTO-ENTMCNC: 32.8 G/DL (ref 32–36)
MCV RBC AUTO: 92 FL (ref 80–100)
MRSA DNA SPEC QL NAA+PROBE: DETECTED
NRBC BLD-RTO: 0 /100 WBCS (ref 0–0)
OXYHGB MFR BLDA: 97 % (ref 94–98)
PCO2 BLDA: 33 MM HG (ref 38–42)
PEEP CMH2O: 5 CM H2O
PH BLDA: 7.44 PH (ref 7.38–7.42)
PLATELET # BLD AUTO: 308 X10*3/UL (ref 150–450)
PO2 BLDA: 95 MM HG (ref 85–95)
POTASSIUM SERPL-SCNC: 4.1 MMOL/L (ref 3.5–5.3)
PRESSURE SUPPORT: 10 CM H2O
PROT SERPL-MCNC: 6.8 G/DL (ref 6.4–8.2)
RBC # BLD AUTO: 4.42 X10*6/UL (ref 4–5.2)
SAO2 % BLDA: 99 % (ref 94–100)
SODIUM SERPL-SCNC: 138 MMOL/L (ref 136–145)
WBC # BLD AUTO: 19.8 X10*3/UL (ref 4.4–11.3)

## 2024-09-03 PROCEDURE — 82565 ASSAY OF CREATININE: CPT | Performed by: INTERNAL MEDICINE

## 2024-09-03 PROCEDURE — 82805 BLOOD GASES W/O2 SATURATION: CPT | Performed by: INTERNAL MEDICINE

## 2024-09-03 PROCEDURE — 2500000004 HC RX 250 GENERAL PHARMACY W/ HCPCS (ALT 636 FOR OP/ED): Performed by: INTERNAL MEDICINE

## 2024-09-03 PROCEDURE — 2500000001 HC RX 250 WO HCPCS SELF ADMINISTERED DRUGS (ALT 637 FOR MEDICARE OP): Performed by: INTERNAL MEDICINE

## 2024-09-03 PROCEDURE — 94003 VENT MGMT INPAT SUBQ DAY: CPT

## 2024-09-03 PROCEDURE — 84145 PROCALCITONIN (PCT): CPT | Mod: SAMLAB | Performed by: STUDENT IN AN ORGANIZED HEALTH CARE EDUCATION/TRAINING PROGRAM

## 2024-09-03 PROCEDURE — 99291 CRITICAL CARE FIRST HOUR: CPT | Performed by: STUDENT IN AN ORGANIZED HEALTH CARE EDUCATION/TRAINING PROGRAM

## 2024-09-03 PROCEDURE — 82947 ASSAY GLUCOSE BLOOD QUANT: CPT

## 2024-09-03 PROCEDURE — 36600 WITHDRAWAL OF ARTERIAL BLOOD: CPT

## 2024-09-03 PROCEDURE — 31720 CLEARANCE OF AIRWAYS: CPT

## 2024-09-03 PROCEDURE — 71045 X-RAY EXAM CHEST 1 VIEW: CPT | Performed by: RADIOLOGY

## 2024-09-03 PROCEDURE — 85027 COMPLETE CBC AUTOMATED: CPT | Performed by: INTERNAL MEDICINE

## 2024-09-03 PROCEDURE — 2500000001 HC RX 250 WO HCPCS SELF ADMINISTERED DRUGS (ALT 637 FOR MEDICARE OP): Performed by: HOSPITALIST

## 2024-09-03 PROCEDURE — 87641 MR-STAPH DNA AMP PROBE: CPT | Performed by: STUDENT IN AN ORGANIZED HEALTH CARE EDUCATION/TRAINING PROGRAM

## 2024-09-03 PROCEDURE — 36415 COLL VENOUS BLD VENIPUNCTURE: CPT | Performed by: STUDENT IN AN ORGANIZED HEALTH CARE EDUCATION/TRAINING PROGRAM

## 2024-09-03 PROCEDURE — 94681 O2 UPTK CO2 OUTP % O2 XTRC: CPT

## 2024-09-03 PROCEDURE — 2500000001 HC RX 250 WO HCPCS SELF ADMINISTERED DRUGS (ALT 637 FOR MEDICARE OP)

## 2024-09-03 PROCEDURE — 2500000005 HC RX 250 GENERAL PHARMACY W/O HCPCS: Performed by: INTERNAL MEDICINE

## 2024-09-03 PROCEDURE — 71045 X-RAY EXAM CHEST 1 VIEW: CPT

## 2024-09-03 PROCEDURE — 99291 CRITICAL CARE FIRST HOUR: CPT | Performed by: INTERNAL MEDICINE

## 2024-09-03 PROCEDURE — 36415 COLL VENOUS BLD VENIPUNCTURE: CPT | Performed by: INTERNAL MEDICINE

## 2024-09-03 PROCEDURE — 94762 N-INVAS EAR/PLS OXIMTRY CONT: CPT

## 2024-09-03 PROCEDURE — 2020000001 HC ICU ROOM DAILY

## 2024-09-03 PROCEDURE — 87040 BLOOD CULTURE FOR BACTERIA: CPT | Mod: SAMLAB | Performed by: STUDENT IN AN ORGANIZED HEALTH CARE EDUCATION/TRAINING PROGRAM

## 2024-09-03 PROCEDURE — 87077 CULTURE AEROBIC IDENTIFY: CPT | Mod: SAMLAB | Performed by: STUDENT IN AN ORGANIZED HEALTH CARE EDUCATION/TRAINING PROGRAM

## 2024-09-03 PROCEDURE — 83605 ASSAY OF LACTIC ACID: CPT | Performed by: STUDENT IN AN ORGANIZED HEALTH CARE EDUCATION/TRAINING PROGRAM

## 2024-09-03 PROCEDURE — 2500000004 HC RX 250 GENERAL PHARMACY W/ HCPCS (ALT 636 FOR OP/ED): Performed by: STUDENT IN AN ORGANIZED HEALTH CARE EDUCATION/TRAINING PROGRAM

## 2024-09-03 RX ORDER — LEVOFLOXACIN 5 MG/ML
750 INJECTION, SOLUTION INTRAVENOUS EVERY 24 HOURS
Status: DISCONTINUED | OUTPATIENT
Start: 2024-09-03 | End: 2024-09-03

## 2024-09-03 RX ORDER — VANCOMYCIN 2 GRAM/500 ML IN 0.9 % SODIUM CHLORIDE INTRAVENOUS
2000 ONCE
Status: COMPLETED | OUTPATIENT
Start: 2024-09-03 | End: 2024-09-03

## 2024-09-03 RX ORDER — VANCOMYCIN HYDROCHLORIDE 1 G/20ML
INJECTION, POWDER, LYOPHILIZED, FOR SOLUTION INTRAVENOUS DAILY PRN
Status: DISCONTINUED | OUTPATIENT
Start: 2024-09-03 | End: 2024-09-03

## 2024-09-03 RX ORDER — VANCOMYCIN HYDROCHLORIDE 1 G/200ML
1000 INJECTION, SOLUTION INTRAVENOUS EVERY 12 HOURS
Status: DISCONTINUED | OUTPATIENT
Start: 2024-09-04 | End: 2024-09-03

## 2024-09-03 ASSESSMENT — PAIN - FUNCTIONAL ASSESSMENT
PAIN_FUNCTIONAL_ASSESSMENT: CPOT (CRITICAL CARE PAIN OBSERVATION TOOL)
PAIN_FUNCTIONAL_ASSESSMENT: CPOT (CRITICAL CARE PAIN OBSERVATION TOOL)

## 2024-09-03 NOTE — PROGRESS NOTES
" sent updated notes to referral facilities via luma-id -- may need to fax same as patient's chart is marked \"Confidential\". SW to check with facilities. Per Mel[place], [facility] in [place] is able to accept patient, pending placement of trach/PEG. ([address], [place] [address]). [facility] ([facility] [phone]) are also willing to accept patient at [facility]. ([address], [place] [address]) Patient's father/Richi Santiago's address is [address], [place] [address]. [facility] is about 30 mins away; [facility] is also about 30 mins away.   - 1055: Patient reveiwed in care rounds mtg. Per medical team, plan is to place patient's trach and PEG tomorrow. Per Melida [facility] will need to continue to have updates faxed to [phone]. SW to fax same when notes for today are completed. SW attempted to phone patient's father/Richi; needed to leave a message; awaits return call.  - 1310: SW went to patient's room to check in with patient. Nursing busy with patient's care; no family present at bedside.  - 1550: SW sent updated notes to referral facilities via luma-id and fax. Plan for patient TBD: [facility] (ECF w vent/trach capability) vs. [facility] ([facility]) pending most appropriate disposition after patient's surgeries, and pending insurance auth. Care Transitions to follow and assist. VERNA Garcia   "

## 2024-09-03 NOTE — NURSING NOTE
Pt resting calmly in bed, responds to voice by opening eyes, but no tracking and closes eyes after a minute or two. Pupils sluggish, but reactive. Pt does not follow commands.

## 2024-09-03 NOTE — CARE PLAN
The patient's goals for the shift include      The clinical goals for the shift include will have decrease diarrhea throughout shift      Problem: Discharge Planning  Goal: Discharge to home or other facility with appropriate resources  Outcome: Progressing     Problem: Knowledge Deficit  Goal: Patient/family/caregiver demonstrates understanding of disease process, treatment plan, medications, and discharge instructions  Outcome: Progressing     Problem: Mechanical Ventilation  Goal: Ability to express needs and understand communication  Outcome: Progressing  Goal: Mobility/activity is maintained at optimum level for patient  Outcome: Progressing  Goal: Patient Will Maintain Patent Airway  Outcome: Progressing  Goal: Oral health is maintained or improved  Outcome: Progressing  Goal: ET tube will be managed safely  Outcome: Progressing     Problem: Skin  Goal: Decreased wound size/increased tissue granulation at next dressing change  Outcome: Progressing  Goal: Participates in plan/prevention/treatment measures  Outcome: Progressing  Goal: Prevent/manage excess moisture  Outcome: Progressing  Goal: Prevent/minimize sheer/friction injuries  Outcome: Progressing  Goal: Promote/optimize nutrition  Outcome: Progressing  Goal: Promote skin healing  Outcome: Progressing     Problem: Fall/Injury  Goal: Not fall by end of shift  Outcome: Progressing  Goal: Verbalize understanding of personal risk factors for fall in the hospital  Outcome: Progressing  Goal: Verbalize understanding of risk factor reduction measures to prevent injury from fall in the home  Outcome: Progressing  Goal: Pace activities to prevent fatigue by end of the shift  Outcome: Progressing  Goal: Be free from injury by end of the shift  Outcome: Progressing     Problem: Respiratory - Adult  Goal: Achieves optimal ventilation and oxygenation  Outcome: Progressing     Problem: Genitourinary - Adult  Goal: Absence of urinary retention  Outcome:  Progressing  Goal: Urinary catheter remains patent  Outcome: Progressing     Problem: Metabolic/Fluid and Electrolytes - Adult  Goal: Electrolytes maintained within normal limits  Outcome: Progressing  Goal: Hemodynamic stability and optimal renal function maintained  Outcome: Progressing  Goal: Glucose maintained within prescribed range  Outcome: Progressing     Problem: Respiratory  Goal: Clear secretions with interventions this shift  Outcome: Progressing  Goal: Minimize anxiety/maximize coping throughout shift  Outcome: Progressing  Goal: Minimal/no exertional discomfort or dyspnea this shift  Outcome: Progressing  Goal: No signs of respiratory distress (eg. Use of accessory muscles. Peds grunting)  Outcome: Progressing  Goal: Patent airway maintained this shift  Outcome: Progressing  Goal: Tolerate mechanical ventilation evidenced by VS/agitation level this shift  Outcome: Progressing  Goal: Tolerate pulmonary toileting this shift  Outcome: Progressing  Goal: Wean oxygen to maintain O2 saturation per order/standard this shift  Outcome: Progressing  Goal: Increase self care and/or family involvement in next 24 hours  Outcome: Progressing     Problem: Nutrition  Goal: Tube feed tolerance  Outcome: Progressing     Problem: Pain  Goal: Turns in bed with improved pain control throughout the shift  Outcome: Progressing

## 2024-09-03 NOTE — CONSULTS
Vancomycin Dosing by Pharmacy- INITIAL    Fidelia Santiago is a 49 y.o. year old female who Pharmacy has been consulted for vancomycin dosing for pneumonia. Based on the patient's indication and renal status this patient will be dosed based on a goal AUC of 500-600.     Renal function is currently stable.    Visit Vitals  BP (!) 153/104   Pulse 99   Temp 37.6 °C (99.7 °F) (Axillary)   Resp 18        Lab Results   Component Value Date    CREATININE 1.01 2024    CREATININE 1.24 (H) 2024    CREATININE 1.50 (H) 2024    CREATININE 1.88 (H) 2024        Patient weight is as follows:   Vitals:    24 0700   Weight: 93.4 kg (205 lb 14.6 oz)       Cultures:  No results found for the encounter in last 14 days.        I/O last 3 completed shifts:  In: 2334 (25 mL/kg) [NG/GT:2334]  Out: 3325 (35.6 mL/kg) [Urine:3325 (1 mL/kg/hr)]  Weight: 93.4 kg   I/O during current shift:  No intake/output data recorded.    Temp (24hrs), Av.2 °C (97.1 °F), Min:34.9 °C (94.8 °F), Max:37.6 °C (99.7 °F)         Assessment/Plan     Patient has already been given a loading dose of 2000 mg.  Will initiate vancomycin maintenance,  1000 mg every 12 hours.    This dosing regimen is predicted by [employer] to result in the following pharmacokinetic parameters:  Loading dose: 2000 mg  Regimen: 1000 mg IV every 12 hours.  Start time: 23:26 on 2024  Exposure target: AUC24 (range)400-600 mg/L.hr   AUC24,ss: 510 mg/L.hr  Probability of AUC24 > 400: 75 %  Ctrough,ss: 16.7 mg/L  Probability of Ctrough,ss > 20: 34 %  Probability of nephrotoxicity (Lodise RANCHO ): 12 %      Follow-up level will be ordered on 2024 at 0500 unless clinically indicated sooner.  Will continue to monitor renal function daily while on vancomycin and order serum creatinine at least every 48 hours if not already ordered.  Follow for continued vancomycin needs, clinical response, and signs/symptoms of toxicity.       Randolph Szymanski, h

## 2024-09-03 NOTE — PROGRESS NOTES
You are just sleepy, every week mostly this was the other day this is Fidelia Santiago is a 49 y.o. female on day 12 of admission presenting with No Principal Problem: There is no principal problem currently on the Problem List. Please update the Problem List and refresh..      Subjective   Patient seen and examined bedside.  She opens her eyes but not tracking on name calling.  This is a new response.  She did have some eye opening in the past but it was not responsive several days now.  Back to opening her eyes now.  She did have a temperature today.  We will order all new cultures and chest x-ray will start her on antibiotics.    Objective     Last Recorded Vitals  BP (!) 153/104   Pulse 99   Temp 37.6 °C (99.7 °F) (Axillary)   Resp 18   Wt 93.4 kg (205 lb 14.6 oz)   SpO2 96%   Intake/Output last 3 Shifts:    Intake/Output Summary (Last 24 hours) at 9/3/2024 1242  Last data filed at 9/3/2024 0600  Gross per 24 hour   Intake 428 ml   Output 1550 ml   Net -1122 ml       Admission Weight  Weight: 79.4 kg (175 lb) (08/21/24 2007)    Daily Weight  08/28/24 : 93.4 kg (205 lb 14.6 oz)    Image Results  EEG  IMPRESSION    Impression  This EEG is indicative severe diffuse encephalopathy. No seizures are recorded.    A full report will be scanned into the patient's chart at a later time.    This report has been interpreted and electronically signed by      Physical Exam  Constitutional:       Comments: Ventilated on spontaneous respiration   HENT:      Head: Normocephalic and atraumatic.      Comments: Opens eyes on name calling     Right Ear: Tympanic membrane normal.      Left Ear: Tympanic membrane normal.      Nose: Nose normal.      Mouth/Throat:      Mouth: Mucous membranes are moist.   Eyes:      Pupils: Pupils are equal, round, and reactive to light.   Cardiovascular:      Rate and Rhythm: Normal rate and regular rhythm.      Pulses: Normal pulses.      Heart sounds: Normal heart sounds.   Pulmonary:       Effort: Pulmonary effort is normal.      Breath sounds: Normal breath sounds.   Abdominal:      General: Abdomen is flat.      Palpations: Abdomen is soft.   Musculoskeletal:         General: Normal range of motion.      Comments: Stage I sacral ulcer on bilateral buttocks   Neurological:      General: No focal deficit present.   Psychiatric:         Mood and Affect: Mood normal.         Relevant Results    Scheduled medications  amLODIPine, 5 mg, nasogastric tube, Daily  bisacodyl, 10 mg, rectal, Daily  carvedilol, 3.125 mg, oral, BID  famotidine, 20 mg, nasogastric tube, Daily  heparin (porcine), 5,000 Units, subcutaneous, q8h [facility]  nystatin, 1 Application, Topical, BID  oxygen, , inhalation, Continuous - Inhalation  perflutren protein A microsphere, 0.5 mL, intravenous, Once in imaging  piperacillin-tazobactam, 4.5 g, intravenous, q6h  sulfur hexafluoride microsphr, 2 mL, intravenous, Once in imaging  [START ON 9/4/2024] vancomycin, 1,000 mg, intravenous, q12h  vancomycin, 2,000 mg, intravenous, Once      Continuous medications     PRN medications  PRN medications: acetaminophen **OR** acetaminophen **OR** acetaminophen, loperamide, magnesium hydroxide, ondansetron ODT **OR** ondansetron, vancomycin, zinc oxide         Scheduled medications  amLODIPine, 5 mg, nasogastric tube, Daily  bisacodyl, 10 mg, rectal, Daily  carvedilol, 3.125 mg, oral, BID  famotidine, 20 mg, nasogastric tube, Daily  heparin (porcine), 5,000 Units, subcutaneous, q8h [facility]  nystatin, 1 Application, Topical, BID  oxygen, , inhalation, Continuous - Inhalation  perflutren protein A microsphere, 0.5 mL, intravenous, Once in imaging  piperacillin-tazobactam, 4.5 g, intravenous, q6h  sulfur hexafluoride microsphr, 2 mL, intravenous, Once in imaging  [START ON 9/4/2024] vancomycin, 1,000 mg, intravenous, q12h  vancomycin, 2,000 mg, intravenous, Once      Continuous medications     PRN medications  PRN medications: acetaminophen **OR** acetaminophen  **OR** acetaminophen, loperamide, magnesium hydroxide, ondansetron ODT **OR** ondansetron, vancomycin, zinc oxide        Assessment/Plan   This patient currently has cardiac telemetry ordered; if you would like to modify or discontinue the telemetry order, click here to go to the orders activity to modify/discontinue the order.      This patient has a urinary catheter   Reason for the urinary catheter remaining today?   This patient is intubated   Reason for patient to remain intubated today? we are providing ongoing neuro resuscitation        Assessment & Plan    49-year-old obese female with a past medical history of bipolar disorder, dermatofibroma of the left arm, fibromyalgia, chronic pain, nonalcoholic fatty liver disease, and vitamin D deficiency who was brought by the EMS to the emergency room after being found unresponsive by her boyfriend. EMS noted that boyfriend was very aggressive on scene and placed into police custody. In the ER, patient was found to have a sepsis [leukocytosis, hypothermia, lactic acidosis] associated with acute kidney injury, hypercapnic respiratory failure with respiratory acidosis, hypocalcemia, transaminitis, rhabdomyolysis, and elevated troponin. All in the setting of an underlying possible aspiration pneumonia of gram-negative bacteria origin.      Assessment  ?  Ventilator associated pneumonia  Acute metabolic encephalopathy  Acute hypoxic aspiratory failure requiring intubation  Aspiration pneumonia  Drug overdose  NSTEMI type II likely secondary demand ischemia  Acute renal failure  Anuria  Rhabdomyolysis  Hypokalemia  Hypocalcemia  Shock liver  Leukocytosis  Sacral ulcer    Plan  Patient opens eyes when called his name.  This is a little bit of improvement  Patient continues to be an anoxic encephalopathy.  She has not woken up yet  She did have a new fever today.  Will obtain chest x-ray, sputum culture, pneumonia workup.  She is started on Vanco and continue Zosyn  She  was hyponatremic and that has normalized.  Electrolytes are stable  Continue current ongoing ICU care  Continue vent management per ICU  Continue tube feeds  Creatinine is stabilized and trending normal  Vitals are stable  Replete electrolytes as needed  She has a white count but no signs of infection  Plan for trach and PEG placement.  On Wednesday  Frequent repositioning  Garcia care  Plan for SNF placement  Social work to assist in discharge planning    DVT prophylaxis: Heparin  GI prophylaxis: Famotidine  Full code            Junaid Rosenberg MD

## 2024-09-03 NOTE — PROGRESS NOTES
Fidelia Santiago is a 49 y.o. female on day 12 of admission presenting with No Principal Problem: There is no principal problem currently on the Problem List. Please update the Problem List and refresh..      Subjective   Patient seen and examined at the bedside this morning  No acute issues or events noted  She is opening her eyes a little bit better at this time       Objective          Vitals 24HR  Heart Rate:  []   Temp:  [34.9 °C (94.8 °F)-37.6 °C (99.7 °F)]   Resp:  [13-26]   BP: (130-161)/()   SpO2:  [95 %-99 %]         Intake/Output last 3 Shifts:    Intake/Output Summary (Last 24 hours) at 9/3/2024 1448  Last data filed at 9/3/2024 0600  Gross per 24 hour   Intake 321 ml   Output 1550 ml   Net -1229 ml       Physical Exam  Constitutional:       Comments: Opens eyes slightly to voice today   HENT:      Head: Normocephalic and atraumatic.      Right Ear: External ear normal.      Left Ear: External ear normal.      Nose: Nose normal.      Mouth/Throat:      Mouth: Mucous membranes are moist.      Pharynx: Oropharynx is clear.      Comments: Endotracheal and feeding tube in place  Eyes:      Comments: Opens eyes briefly to voice   Cardiovascular:      Rate and Rhythm: Regular rhythm. Tachycardia present.   Pulmonary:      Effort: Pulmonary effort is normal.      Breath sounds: Normal breath sounds.   Abdominal:      General: Abdomen is flat.      Palpations: Abdomen is soft.   Genitourinary:     Comments: Garcia catheter in place  Musculoskeletal:         General: Swelling present.      Right lower leg: Edema present.      Left lower leg: Edema present.   Skin:     General: Skin is warm and dry.   Neurological:      Comments: Gag reflex present  Breathing over the ventilator  Opens eyes to voice briefly  Grimaces to noxious stimuli       Scheduled medications  amLODIPine, 5 mg, nasogastric tube, Daily  bisacodyl, 10 mg, rectal, Daily  carvedilol, 3.125 mg, oral, BID  famotidine, 20 mg, nasogastric  tube, Daily  heparin (porcine), 5,000 Units, subcutaneous, q8h [facility]I  nystatin, 1 Application, Topical, BID  oxygen, , inhalation, Continuous - Inhalation  perflutren protein A microsphere, 0.5 mL, intravenous, Once in imaging  piperacillin-tazobactam, 4.5 g, intravenous, q6h  sulfur hexafluoride microsphr, 2 mL, intravenous, Once in imaging  [START ON 9/4/2024] vancomycin, 1,000 mg, intravenous, q12h      Continuous medications     PRN medications  PRN medications: acetaminophen **OR** acetaminophen **OR** acetaminophen, loperamide, magnesium hydroxide, ondansetron ODT **OR** ondansetron, vancomycin, zinc oxide    Relevant Results               Assessment/Plan   This patient currently has cardiac telemetry ordered; if you would like to modify or discontinue the telemetry order, click here to go to the orders activity to modify/discontinue the order.          Assessment & Plan    Anoxic brain injury  Comatose state  Drug overdose with unknown downtime  Systolic congestive heart failure biventricular  Leukocytosis  Diarrhea: Improved    Plan:  Her exam today is a little bit improved.  She is starting to respond a little bit more to voice  We will have to see if she continues to make strides  Discontinue Zosyn and vancomycin.  At this time no signs of an infectious process she is just starting to get better when it comes to her diarrhea and we will stay away from broad-spectrum antibiotics for now  Continue tube feeds  Stop tube feeds at midnight tonight for trach and PEG tomorrow morning  Renal function has returned to pretty much normal  Electrolytes are looking good  Continue supportive measures as she is on and continue to watch for neurological recovery  She continues to breathe over the ventilator and be stable at this time  She will need placement and ongoing care to see if she can recover neurological function over time  I spent 30 minutes of critical care time directly involved in patient care excluding any  billable procedures           Tomás Randall, DO

## 2024-09-04 LAB
ALBUMIN SERPL BCP-MCNC: 3.6 G/DL (ref 3.4–5)
ALP SERPL-CCNC: 94 U/L (ref 33–110)
ALT SERPL W P-5'-P-CCNC: 37 U/L (ref 7–45)
ANION GAP SERPL CALC-SCNC: 13 MMOL/L (ref 10–20)
AST SERPL W P-5'-P-CCNC: 22 U/L (ref 9–39)
BILIRUB SERPL-MCNC: 0.6 MG/DL (ref 0–1.2)
BUN SERPL-MCNC: 37 MG/DL (ref 6–23)
CALCIUM SERPL-MCNC: 9.2 MG/DL (ref 8.6–10.3)
CHLORIDE SERPL-SCNC: 108 MMOL/L (ref 98–107)
CO2 SERPL-SCNC: 22 MMOL/L (ref 21–32)
CREAT SERPL-MCNC: 1.08 MG/DL (ref 0.5–1.05)
EGFRCR SERPLBLD CKD-EPI 2021: 63 ML/MIN/1.73M*2
ERYTHROCYTE [DISTWIDTH] IN BLOOD BY AUTOMATED COUNT: 12.8 % (ref 11.5–14.5)
GLUCOSE BLD MANUAL STRIP-MCNC: 111 MG/DL (ref 74–99)
GLUCOSE BLD MANUAL STRIP-MCNC: 93 MG/DL (ref 74–99)
GLUCOSE BLD MANUAL STRIP-MCNC: 99 MG/DL (ref 74–99)
GLUCOSE SERPL-MCNC: 100 MG/DL (ref 74–99)
HCT VFR BLD AUTO: 40.2 % (ref 36–46)
HGB BLD-MCNC: 13 G/DL (ref 12–16)
MCH RBC QN AUTO: 29.7 PG (ref 26–34)
MCHC RBC AUTO-ENTMCNC: 32.3 G/DL (ref 32–36)
MCV RBC AUTO: 92 FL (ref 80–100)
NRBC BLD-RTO: 0 /100 WBCS (ref 0–0)
PLATELET # BLD AUTO: 346 X10*3/UL (ref 150–450)
POTASSIUM SERPL-SCNC: 4.2 MMOL/L (ref 3.5–5.3)
PROCALCITONIN SERPL-MCNC: 0.09 NG/ML
PROT SERPL-MCNC: 7.1 G/DL (ref 6.4–8.2)
RBC # BLD AUTO: 4.37 X10*6/UL (ref 4–5.2)
SODIUM SERPL-SCNC: 139 MMOL/L (ref 136–145)
WBC # BLD AUTO: 16.1 X10*3/UL (ref 4.4–11.3)

## 2024-09-04 PROCEDURE — 99152 MOD SED SAME PHYS/QHP 5/>YRS: CPT | Performed by: INTERNAL MEDICINE

## 2024-09-04 PROCEDURE — 2020000001 HC ICU ROOM DAILY

## 2024-09-04 PROCEDURE — 31502 CHANGE OF WINDPIPE AIRWAY: CPT

## 2024-09-04 PROCEDURE — 36415 COLL VENOUS BLD VENIPUNCTURE: CPT | Performed by: INTERNAL MEDICINE

## 2024-09-04 PROCEDURE — 85027 COMPLETE CBC AUTOMATED: CPT | Performed by: INTERNAL MEDICINE

## 2024-09-04 PROCEDURE — 99291 CRITICAL CARE FIRST HOUR: CPT | Performed by: INTERNAL MEDICINE

## 2024-09-04 PROCEDURE — 99291 CRITICAL CARE FIRST HOUR: CPT | Performed by: STUDENT IN AN ORGANIZED HEALTH CARE EDUCATION/TRAINING PROGRAM

## 2024-09-04 PROCEDURE — 0B113F4 BYPASS TRACHEA TO CUTANEOUS WITH TRACHEOSTOMY DEVICE, PERCUTANEOUS APPROACH: ICD-10-PCS | Performed by: INTERNAL MEDICINE

## 2024-09-04 PROCEDURE — 31622 DX BRONCHOSCOPE/WASH: CPT

## 2024-09-04 PROCEDURE — 94762 N-INVAS EAR/PLS OXIMTRY CONT: CPT

## 2024-09-04 PROCEDURE — 80053 COMPREHEN METABOLIC PANEL: CPT | Performed by: INTERNAL MEDICINE

## 2024-09-04 PROCEDURE — 31600 PLANNED TRACHEOSTOMY: CPT | Performed by: INTERNAL MEDICINE

## 2024-09-04 PROCEDURE — 31720 CLEARANCE OF AIRWAYS: CPT

## 2024-09-04 PROCEDURE — 94003 VENT MGMT INPAT SUBQ DAY: CPT

## 2024-09-04 PROCEDURE — 2500000005 HC RX 250 GENERAL PHARMACY W/O HCPCS: Performed by: INTERNAL MEDICINE

## 2024-09-04 PROCEDURE — 2500000004 HC RX 250 GENERAL PHARMACY W/ HCPCS (ALT 636 FOR OP/ED)

## 2024-09-04 PROCEDURE — 82947 ASSAY GLUCOSE BLOOD QUANT: CPT

## 2024-09-04 PROCEDURE — 2500000001 HC RX 250 WO HCPCS SELF ADMINISTERED DRUGS (ALT 637 FOR MEDICARE OP): Performed by: HOSPITALIST

## 2024-09-04 PROCEDURE — 2500000004 HC RX 250 GENERAL PHARMACY W/ HCPCS (ALT 636 FOR OP/ED): Performed by: INTERNAL MEDICINE

## 2024-09-04 RX ORDER — SUCCINYLCHOLINE CHLORIDE 20 MG/ML
INJECTION INTRAMUSCULAR; INTRAVENOUS CODE/TRAUMA/SEDATION MEDICATION
Status: COMPLETED | OUTPATIENT
Start: 2024-09-04 | End: 2024-09-04

## 2024-09-04 RX ORDER — PROPOFOL 10 MG/ML
INJECTION, EMULSION INTRAVENOUS
Status: COMPLETED
Start: 2024-09-04 | End: 2024-09-04

## 2024-09-04 RX ORDER — SUCCINYLCHOLINE CHLORIDE 20 MG/ML
INJECTION INTRAMUSCULAR; INTRAVENOUS
Status: DISPENSED
Start: 2024-09-04 | End: 2024-09-04

## 2024-09-04 RX ORDER — PROPOFOL 10 MG/ML
INJECTION, EMULSION INTRAVENOUS
Status: DISPENSED
Start: 2024-09-04 | End: 2024-09-04

## 2024-09-04 RX ORDER — PROPOFOL 10 MG/ML
100 INJECTION, EMULSION INTRAVENOUS AS NEEDED
Status: DISCONTINUED | OUTPATIENT
Start: 2024-09-04 | End: 2024-09-06

## 2024-09-04 RX ORDER — PROPOFOL 10 MG/ML
0-50 INJECTION, EMULSION INTRAVENOUS CONTINUOUS
Status: DISCONTINUED | OUTPATIENT
Start: 2024-09-04 | End: 2024-09-06

## 2024-09-04 RX ORDER — PROPOFOL 10 MG/ML
INJECTION, EMULSION INTRAVENOUS CODE/TRAUMA/SEDATION MEDICATION
Status: COMPLETED | OUTPATIENT
Start: 2024-09-04 | End: 2024-09-04

## 2024-09-04 ASSESSMENT — PAIN - FUNCTIONAL ASSESSMENT

## 2024-09-04 NOTE — CARE PLAN
Problem: Other goals  Goal: No change in neurological status  Outcome: Met  Goal: Stabilize vital signs (return to 10% of baseline)  Outcome: Met    Pt resting comfortably on Propofol. VS WNL. Report given to Robyn MOORE at bedside post-procedure.

## 2024-09-04 NOTE — PROCEDURES
Tracheostomy  Reason: Need for respiratory support with anoxic brain injury  Consent: Obtained from father  Timeout: Performed at bedside with Christina Foley Amanda    Procedure:  Propofol and succinylcholine was used for sedation  This was a very challenging tracheostomy.  The endotracheal tube was a 6-1/2 and we attempted to exchange this over the bougie but we were unable to do so it would not advance.  Due to this I went ahead and reintubated her with a glide a scope  It was difficult to get the tube to pass through the vocal cords  We were also having technical difficulties with the scope and were not able to get a picture and then not able to get good pictures.  I made an incision in the midline laterally about 2 fingerbreadths above the clavicle  I was able to easily palpate the thyroid cartilage and made the incision just below the thyroid cartilage  Due to inability to get a good picture with the bronchoscope and with multiple attempts to get the bronchoscope to work well I went ahead and had the tube pulled back and used landmarks advanced the needle until I was able to aspirate air  I then placed to the wire through the needle and removed the needle  I then dilated and then placed the rapid Rhino with dilation and placed an 8.0 tracheostomy  I was then able to advance the bronchoscope through the new tracheostomy and able to visualize the fredo and we are in good position  She was hooked up to the ventilator  Blood loss was approximately 10 mL  While we are trying to get everything situated we used propofol pushes of 200 mcg and then we just went ahead and placed her on a propofol drip.  We also used 250 mg total of succinylcholine through the entire procedure  The procedure was 1 hour and time  Other than the technical difficulties after we worked through the technical difficulties with the bronchoscope and being able to get pictures the tracheostomy itself was placed fairly easily  As above blood loss  was about 10 mL  We used plenty of sedation to keep her comfortable through the process while she was paralyzed

## 2024-09-04 NOTE — PROGRESS NOTES
Fidelia Santiago is a 49 y.o. female on day 13 of admission presenting with No Principal Problem: There is no principal problem currently on the Problem List. Please update the Problem List and refresh..      Subjective   Patient seen and examined at the bedside this morning  No acute issues or events noted  No major changes at this time       Objective          Vitals 24HR  Heart Rate:  []   Temp:  [35.4 °C (95.7 °F)-37.6 °C (99.7 °F)]   Resp:  [12-40]   BP: (106-154)/()   Weight:  [77.4 kg (170 lb 10.2 oz)]   SpO2:  [90 %-99 %]         Intake/Output last 3 Shifts:    Intake/Output Summary (Last 24 hours) at 9/4/2024 0808  Last data filed at 9/4/2024 0000  Gross per 24 hour   Intake 2788 ml   Output 1550 ml   Net 1238 ml       Physical Exam  Constitutional:       Comments: Opens eyes slightly to voice today   HENT:      Head: Normocephalic and atraumatic.      Right Ear: External ear normal.      Left Ear: External ear normal.      Nose: Nose normal.      Mouth/Throat:      Mouth: Mucous membranes are moist.      Pharynx: Oropharynx is clear.      Comments: Endotracheal and feeding tube in place  Eyes:      Comments: Opens eyes briefly to voice   Cardiovascular:      Rate and Rhythm: Regular rhythm. Tachycardia present.   Pulmonary:      Effort: Pulmonary effort is normal.      Breath sounds: Normal breath sounds.   Abdominal:      General: Abdomen is flat.      Palpations: Abdomen is soft.   Genitourinary:     Comments: Garcia catheter in place  Musculoskeletal:         General: Swelling present.      Right lower leg: Edema present.      Left lower leg: Edema present.   Skin:     General: Skin is warm and dry.   Neurological:      Comments: Gag reflex present  Breathing over the ventilator  Opens eyes to voice briefly  Grimaces to noxious stimuli     Scheduled medications  amLODIPine, 5 mg, nasogastric tube, Daily  bisacodyl, 10 mg, rectal, Daily  carvedilol, 3.125 mg, oral, BID  famotidine, 20 mg,  nasogastric tube, Daily  heparin (porcine), 5,000 Units, subcutaneous, q8h [facility]I  nystatin, 1 Application, Topical, BID  oxygen, , inhalation, Continuous - Inhalation  perflutren protein A microsphere, 0.5 mL, intravenous, Once in imaging  propofol, , ,   succinylcholine, , ,   succinylcholine, , ,   sulfur hexafluoride microsphr, 2 mL, intravenous, Once in imaging      Continuous medications  propofol, 0-50 mcg/kg/min, Last Rate: 50 mcg/kg/min (09/04/24 0739)      PRN medications  PRN medications: acetaminophen **OR** acetaminophen **OR** acetaminophen, loperamide, magnesium hydroxide, ondansetron ODT **OR** ondansetron, propofol, propofol, propofol, succinylcholine, succinylcholine, succinylcholine, zinc oxide      Relevant Results               Assessment/Plan   This patient currently has cardiac telemetry ordered; if you would like to modify or discontinue the telemetry order, click here to go to the orders activity to modify/discontinue the order.    This patient has a urinary catheter   Reason for the urinary catheter remaining today? critically ill patient who need accurate urinary output measurements        Assessment & Plan    Anoxic brain injury  Comatose state  Drug overdose with unknown downtime  Systolic congestive heart failure biventricular  Leukocytosis  Diarrhea: Improved    Plan:  At this time we will continue her medical management as she is on  Tracheostomy today  Will also need PEG tube  Will need placement in the next 24 to 48 hours  I spent 30 minutes of critical care time directly involved in patient care excluding billable procedures           Tomás Randall, DO

## 2024-09-04 NOTE — PROGRESS NOTES
Per medical team, patient received tracheostomy this morning, and awaits PEG placement later today. Patient may possibly be ready for the next level of care by Friday.  went to patient's bedside where patient continues to be unresponsive on vent. SW spoke to patient and assured patient that the [facility] team continues to care for patient, and will continue to assist with her planning. Per PT/OT and RT, [facility] may be most appropriate for this patient. YULISSA spoke with patient's father/Richi who is in agreement with same. YULISSA sent updated notes to [facility] (due to an issue in [facility], this referral also needs to be sent to [facility] so that central admissions for [facility] can see the referral.) YULISSA updated [facility], and passed their contact information to [facility]'s discharge planning team. Per [facility] Auth Team, LTACH will need to submit for precert for patient's [facility] insurance plan. YULISSA asked [facility] for same; [facility] to submit for precert. Plan for patient is to discharge to [facility] ([facility]) when patient is medically ready following new trach and PEG placement today, and pending insurance auth. Care Transitions to follow and assist. VERNA Garcia

## 2024-09-04 NOTE — PROGRESS NOTES
You are just sleepy, every week mostly this was the other day this is Fidelia Santiago is a 49 y.o. female on day 13 of admission presenting with No Principal Problem: There is no principal problem currently on the Problem List. Please update the Problem List and refresh..      Subjective   Patient seen and examined bedside.  S/p tracheostomy this morning  Objective     Last Recorded Vitals  /82   Pulse 98   Temp 36.9 °C (98.5 °F) (Axillary)   Resp 20   Wt 77.4 kg (170 lb 10.2 oz)   SpO2 98%   Intake/Output last 3 Shifts:    Intake/Output Summary (Last 24 hours) at 9/4/2024 1440  Last data filed at 9/4/2024 1400  Gross per 24 hour   Intake 2788 ml   Output 2065 ml   Net 723 ml       Admission Weight  Weight: 79.4 kg (175 lb) (08/21/24 2007)    Daily Weight  09/04/24 : 77.4 kg (170 lb 10.2 oz)    Image Results  EEG  IMPRESSION    Impression  This EEG is indicative severe diffuse encephalopathy. No seizures are recorded.    A full report will be scanned into the patient's chart at a later time.    This report has been interpreted and electronically signed by      Physical Exam  Constitutional:       Comments: Ventilated on spontaneous respiration   HENT:      Head: Normocephalic and atraumatic.      Comments: Opens eyes on name calling     Right Ear: Tympanic membrane normal.      Left Ear: Tympanic membrane normal.      Nose: Nose normal.      Mouth/Throat:      Mouth: Mucous membranes are moist.   Eyes:      Pupils: Pupils are equal, round, and reactive to light.   Cardiovascular:      Rate and Rhythm: Normal rate and regular rhythm.      Pulses: Normal pulses.      Heart sounds: Normal heart sounds.   Pulmonary:      Effort: Pulmonary effort is normal.      Breath sounds: Normal breath sounds.   Abdominal:      General: Abdomen is flat.      Palpations: Abdomen is soft.   Musculoskeletal:         General: Normal range of motion.      Comments: Stage I sacral ulcer on bilateral buttocks   Neurological:       General: No focal deficit present.   Psychiatric:         Mood and Affect: Mood normal.         Relevant Results    Scheduled medications  amLODIPine, 5 mg, nasogastric tube, Daily  bisacodyl, 10 mg, rectal, Daily  carvedilol, 3.125 mg, oral, BID  famotidine, 20 mg, nasogastric tube, Daily  heparin (porcine), 5,000 Units, subcutaneous, q8h [facility]I  nystatin, 1 Application, Topical, BID  oxygen, , inhalation, Continuous - Inhalation  perflutren protein A microsphere, 0.5 mL, intravenous, Once in imaging  sulfur hexafluoride microsphr, 2 mL, intravenous, Once in imaging      Continuous medications  propofol, 0-50 mcg/kg/min, Last Rate: 30 mcg/kg/min (09/04/24 1219)      PRN medications  PRN medications: acetaminophen **OR** acetaminophen **OR** acetaminophen, loperamide, magnesium hydroxide, ondansetron ODT **OR** ondansetron, propofol, zinc oxide         Scheduled medications  amLODIPine, 5 mg, nasogastric tube, Daily  bisacodyl, 10 mg, rectal, Daily  carvedilol, 3.125 mg, oral, BID  famotidine, 20 mg, nasogastric tube, Daily  heparin (porcine), 5,000 Units, subcutaneous, q8h CAMI  nystatin, 1 Application, Topical, BID  oxygen, , inhalation, Continuous - Inhalation  perflutren protein A microsphere, 0.5 mL, intravenous, Once in imaging  sulfur hexafluoride microsphr, 2 mL, intravenous, Once in imaging      Continuous medications  propofol, 0-50 mcg/kg/min, Last Rate: 30 mcg/kg/min (09/04/24 1219)      PRN medications  PRN medications: acetaminophen **OR** acetaminophen **OR** acetaminophen, loperamide, magnesium hydroxide, ondansetron ODT **OR** ondansetron, propofol, zinc oxide        Assessment/Plan   This patient currently has cardiac telemetry ordered; if you would like to modify or discontinue the telemetry order, click here to go to the orders activity to modify/discontinue the order.      This patient has a urinary catheter   Reason for the urinary catheter remaining today?   This patient is intubated   Reason  for patient to remain intubated today? we are providing ongoing neuro resuscitation    Scheduled medications  amLODIPine, 5 mg, nasogastric tube, Daily  bisacodyl, 10 mg, rectal, Daily  carvedilol, 3.125 mg, oral, BID  famotidine, 20 mg, nasogastric tube, Daily  heparin (porcine), 5,000 Units, subcutaneous, q8h CAMI  nystatin, 1 Application, Topical, BID  oxygen, , inhalation, Continuous - Inhalation  perflutren protein A microsphere, 0.5 mL, intravenous, Once in imaging  sulfur hexafluoride microsphr, 2 mL, intravenous, Once in imaging      Continuous medications  propofol, 0-50 mcg/kg/min, Last Rate: 30 mcg/kg/min (09/04/24 1219)      PRN medications  PRN medications: acetaminophen **OR** acetaminophen **OR** acetaminophen, loperamide, magnesium hydroxide, ondansetron ODT **OR** ondansetron, propofol, zinc oxide      Assessment & Plan    49-year-old obese female with a past medical history of bipolar disorder, dermatofibroma of the left arm, fibromyalgia, chronic pain, nonalcoholic fatty liver disease, and vitamin D deficiency who was brought by the EMS to the emergency room after being found unresponsive by her boyfriend. EMS noted that boyfriend was very aggressive on scene and placed into police custody. In the ER, patient was found to have a sepsis [leukocytosis, hypothermia, lactic acidosis] associated with acute kidney injury, hypercapnic respiratory failure with respiratory acidosis, hypocalcemia, transaminitis, rhabdomyolysis, and elevated troponin. All in the setting of an underlying possible aspiration pneumonia of gram-negative bacteria origin.      Assessment   Ventilator associated pneumonia  Acute metabolic encephalopathy s/p trach   Acute hypoxic aspiratory failure requiring intubation  Aspiration pneumonia  Drug overdose  NSTEMI type II likely secondary demand ischemia  Acute renal failure  Anuria  Rhabdomyolysis resolved   Hypokalemia  Hypocalcemia  Shock liver  Leukocytosis  Sacral  ulcer    Plan    Patient had tracheostomy placed today 09/04/2024  PEG tube will be placed later today  Patient does have bilateral pneumonia and growing gram-negative bacilli.  At this time we would like to see her cultures grew.  Her procalcitonin is low we will decide on antibiotics based on that  We will hold off on antibiotics per ICU  Continue vent management per ICU  Continue tube feeds  Creatinine bumped a little bit  Replete electrolytes as needed  Garcia care  Plan for SNF placement  Social work to assist in discharge planning    DVT prophylaxis: Heparin  GI prophylaxis: Famotidine  Full code            Junaid Rosenberg MD

## 2024-09-04 NOTE — NURSING NOTE
This nurse provided an update to [place] with Poison control via there telephone, per poison control they will call back for continued updates daily.

## 2024-09-04 NOTE — CARE PLAN
Problem: Mechanical Ventilation  Goal: Ability to express needs and understand communication  Outcome: Not Progressing     Problem: Mechanical Ventilation  Goal: Mobility/activity is maintained at optimum level for patient  Outcome: Progressing  Goal: Patient Will Maintain Patent Airway  Outcome: Progressing  Goal: Oral health is maintained or improved  Outcome: Progressing     Problem: Skin  Goal: Decreased wound size/increased tissue granulation at next dressing change  Outcome: Progressing  Flowsheets (Taken 9/4/2024 1007)  Decreased wound size/increased tissue granulation at next dressing change:   Promote sleep for wound healing   Protective dressings over bony prominences  Goal: Participates in plan/prevention/treatment measures  Outcome: Progressing  Flowsheets (Taken 9/4/2024 1007)  Participates in plan/prevention/treatment measures: Elevate heels  Goal: Prevent/manage excess moisture  Outcome: Progressing  Goal: Prevent/minimize sheer/friction injuries  Outcome: Progressing  Flowsheets (Taken 9/4/2024 1007)  Prevent/minimize sheer/friction injuries:   Complete micro-shifts as needed if patient unable. Adjust patient position to relieve pressure points, not a full turn   Use pull sheet   HOB 30 degrees or less   Turn/reposition every 2 hours/use positioning/transfer devices  Goal: Promote/optimize nutrition  Outcome: Progressing  Flowsheets (Taken 9/4/2024 1007)  Promote/optimize nutrition: Monitor/record intake including meals  Goal: Promote skin healing  Outcome: Progressing  Flowsheets (Taken 9/4/2024 1007)  Promote skin healing:   Assess skin/pad under line(s)/device(s)   Turn/reposition every 2 hours/use positioning/transfer devices   Ensure correct size (line/device) and apply per  instructions     Problem: Fall/Injury  Goal: Not fall by end of shift  Outcome: Progressing  Goal: Be free from injury by end of the shift  Outcome: Progressing     Problem: Genitourinary - Adult  Goal: Absence  of urinary retention  Outcome: Progressing  Goal: Urinary catheter remains patent  Outcome: Progressing     Problem: Metabolic/Fluid and Electrolytes - Adult  Goal: Electrolytes maintained within normal limits  Outcome: Progressing  Goal: Hemodynamic stability and optimal renal function maintained  Outcome: Progressing  Goal: Glucose maintained within prescribed range  Outcome: Progressing     Problem: Pain  Goal: Turns in bed with improved pain control throughout the shift  Outcome: Progressing    The clinical goals for the shift include maintain patent trach, VSS      Problem: Mechanical Ventilation  Goal: Ability to express needs and understand communication  Outcome: Not Progressing

## 2024-09-04 NOTE — CARE PLAN
Problem: Mechanical Ventilation  Goal: ET tube will be managed safely  Outcome: Met     Problem: Skin  Goal: Participates in plan/prevention/treatment measures  Flowsheets (Taken 9/4/2024 0534)  Participates in plan/prevention/treatment measures: Elevate heels  Goal: Prevent/manage excess moisture  Flowsheets (Taken 9/4/2024 0534)  Prevent/manage excess moisture: Cleanse incontinence/protect with barrier cream  Goal: Prevent/minimize sheer/friction injuries  Flowsheets (Taken 9/4/2024 0534)  Prevent/minimize sheer/friction injuries: Use pull sheet  Goal: Promote/optimize nutrition  Flowsheets (Taken 9/4/2024 0534)  Promote/optimize nutrition: Monitor/record intake including meals  Goal: Promote skin healing  Flowsheets (Taken 9/4/2024 0534)  Promote skin healing: Turn/reposition every 2 hours/use positioning/transfer devices     Problem: Nutrition  Goal: Tube feed tolerance  Outcome: Met   The patient's goals for the shift include      The clinical goals for the shift include pt will maintain SpO2 >89%  Goal Met

## 2024-09-05 ENCOUNTER — APPOINTMENT (OUTPATIENT)
Dept: OPERATING ROOM | Facility: HOSPITAL | Age: 49
End: 2024-09-05
Payer: COMMERCIAL

## 2024-09-05 LAB
ANION GAP SERPL CALC-SCNC: 14 MMOL/L (ref 10–20)
BUN SERPL-MCNC: 40 MG/DL (ref 6–23)
CALCIUM SERPL-MCNC: 10 MG/DL (ref 8.6–10.6)
CHLORIDE SERPL-SCNC: 109 MMOL/L (ref 98–107)
CO2 SERPL-SCNC: 23 MMOL/L (ref 21–32)
CREAT SERPL-MCNC: 1.25 MG/DL (ref 0.5–1.05)
EGFRCR SERPLBLD CKD-EPI 2021: 53 ML/MIN/1.73M*2
ERYTHROCYTE [DISTWIDTH] IN BLOOD BY AUTOMATED COUNT: 12.8 % (ref 11.5–14.5)
GLUCOSE BLD MANUAL STRIP-MCNC: 97 MG/DL (ref 74–99)
GLUCOSE BLD MANUAL STRIP-MCNC: 97 MG/DL (ref 74–99)
GLUCOSE SERPL-MCNC: 83 MG/DL (ref 74–99)
HCT VFR BLD AUTO: 41.6 % (ref 36–46)
HGB BLD-MCNC: 13.5 G/DL (ref 12–16)
MCH RBC QN AUTO: 30.3 PG (ref 26–34)
MCHC RBC AUTO-ENTMCNC: 32.5 G/DL (ref 32–36)
MCV RBC AUTO: 93 FL (ref 80–100)
NRBC BLD-RTO: 0 /100 WBCS (ref 0–0)
PLATELET # BLD AUTO: 397 X10*3/UL (ref 150–450)
POTASSIUM SERPL-SCNC: 4.2 MMOL/L (ref 3.5–5.3)
RBC # BLD AUTO: 4.46 X10*6/UL (ref 4–5.2)
SODIUM SERPL-SCNC: 142 MMOL/L (ref 136–145)
WBC # BLD AUTO: 12.4 X10*3/UL (ref 4.4–11.3)

## 2024-09-05 PROCEDURE — 2500000001 HC RX 250 WO HCPCS SELF ADMINISTERED DRUGS (ALT 637 FOR MEDICARE OP)

## 2024-09-05 PROCEDURE — 99291 CRITICAL CARE FIRST HOUR: CPT | Performed by: STUDENT IN AN ORGANIZED HEALTH CARE EDUCATION/TRAINING PROGRAM

## 2024-09-05 PROCEDURE — 2500000004 HC RX 250 GENERAL PHARMACY W/ HCPCS (ALT 636 FOR OP/ED): Mod: JZ | Performed by: INTERNAL MEDICINE

## 2024-09-05 PROCEDURE — 2500000005 HC RX 250 GENERAL PHARMACY W/O HCPCS: Performed by: INTERNAL MEDICINE

## 2024-09-05 PROCEDURE — 2020000001 HC ICU ROOM DAILY

## 2024-09-05 PROCEDURE — 80048 BASIC METABOLIC PNL TOTAL CA: CPT | Mod: SAMLAB | Performed by: STUDENT IN AN ORGANIZED HEALTH CARE EDUCATION/TRAINING PROGRAM

## 2024-09-05 PROCEDURE — 2500000001 HC RX 250 WO HCPCS SELF ADMINISTERED DRUGS (ALT 637 FOR MEDICARE OP): Performed by: HOSPITALIST

## 2024-09-05 PROCEDURE — 2500000004 HC RX 250 GENERAL PHARMACY W/ HCPCS (ALT 636 FOR OP/ED): Performed by: INTERNAL MEDICINE

## 2024-09-05 PROCEDURE — 94762 N-INVAS EAR/PLS OXIMTRY CONT: CPT

## 2024-09-05 PROCEDURE — 43246 EGD PLACE GASTROSTOMY TUBE: CPT | Performed by: INTERNAL MEDICINE

## 2024-09-05 PROCEDURE — 3E0G76Z INTRODUCTION OF NUTRITIONAL SUBSTANCE INTO UPPER GI, VIA NATURAL OR ARTIFICIAL OPENING: ICD-10-PCS | Performed by: STUDENT IN AN ORGANIZED HEALTH CARE EDUCATION/TRAINING PROGRAM

## 2024-09-05 PROCEDURE — 99232 SBSQ HOSP IP/OBS MODERATE 35: CPT | Performed by: STUDENT IN AN ORGANIZED HEALTH CARE EDUCATION/TRAINING PROGRAM

## 2024-09-05 PROCEDURE — 82947 ASSAY GLUCOSE BLOOD QUANT: CPT

## 2024-09-05 PROCEDURE — 85027 COMPLETE CBC AUTOMATED: CPT | Performed by: STUDENT IN AN ORGANIZED HEALTH CARE EDUCATION/TRAINING PROGRAM

## 2024-09-05 PROCEDURE — 36415 COLL VENOUS BLD VENIPUNCTURE: CPT | Performed by: STUDENT IN AN ORGANIZED HEALTH CARE EDUCATION/TRAINING PROGRAM

## 2024-09-05 PROCEDURE — 0DH63UZ INSERTION OF FEEDING DEVICE INTO STOMACH, PERCUTANEOUS APPROACH: ICD-10-PCS | Performed by: INTERNAL MEDICINE

## 2024-09-05 PROCEDURE — 2500000001 HC RX 250 WO HCPCS SELF ADMINISTERED DRUGS (ALT 637 FOR MEDICARE OP): Performed by: INTERNAL MEDICINE

## 2024-09-05 PROCEDURE — 2500000001 HC RX 250 WO HCPCS SELF ADMINISTERED DRUGS (ALT 637 FOR MEDICARE OP): Performed by: PHARMACIST

## 2024-09-05 PROCEDURE — 99291 CRITICAL CARE FIRST HOUR: CPT | Performed by: INTERNAL MEDICINE

## 2024-09-05 PROCEDURE — 31720 CLEARANCE OF AIRWAYS: CPT

## 2024-09-05 PROCEDURE — 94003 VENT MGMT INPAT SUBQ DAY: CPT

## 2024-09-05 RX ORDER — CEFAZOLIN SODIUM 1 G/50ML
1 SOLUTION INTRAVENOUS ONCE
Status: COMPLETED | OUTPATIENT
Start: 2024-09-05 | End: 2024-09-05

## 2024-09-05 RX ORDER — MUPIROCIN 20 MG/G
1 OINTMENT TOPICAL 2 TIMES DAILY
Status: DISCONTINUED | OUTPATIENT
Start: 2024-09-05 | End: 2024-09-07 | Stop reason: HOSPADM

## 2024-09-05 RX ORDER — PROPOFOL 10 MG/ML
INJECTION, EMULSION INTRAVENOUS CODE/TRAUMA/SEDATION MEDICATION
Status: COMPLETED | OUTPATIENT
Start: 2024-09-05 | End: 2024-09-05

## 2024-09-05 ASSESSMENT — PAIN - FUNCTIONAL ASSESSMENT

## 2024-09-05 ASSESSMENT — PAIN SCALES - GENERAL
PAINLEVEL_OUTOF10: 0 - NO PAIN

## 2024-09-05 NOTE — PROGRESS NOTES
You are just sleepy, every week mostly this was the other day this is Fidelia Santiago is a 49 y.o. female on day 14 of admission presenting with No Principal Problem: There is no principal problem currently on the Problem List. Please update the Problem List and refresh..      Subjective   Patient seen and examined bedside.  S/p tracheostomy and peg tube placement    Objective     Last Recorded Vitals  /72   Pulse 106   Temp 36.5 °C (97.7 °F) (Temporal)   Resp 20   Wt 72.4 kg (159 lb 9.8 oz) Comment: SCD machine taken off bed for more accurate weight  SpO2 98%   Intake/Output last 3 Shifts:    Intake/Output Summary (Last 24 hours) at 9/5/2024 1717  Last data filed at 9/5/2024 1600  Gross per 24 hour   Intake 473.74 ml   Output 930 ml   Net -456.26 ml       Admission Weight  Weight: 79.4 kg (175 lb) (08/21/24 2007)    Daily Weight  09/05/24 : 72.4 kg (159 lb 9.8 oz)    Image Results  XR chest 1 view  Narrative: Interpreted By:  Felipe Mondragon,   STUDY:  XR CHEST 1 VIEW;  9/3/2024 11:48 am      INDICATION:  Signs/Symptoms:pneumonia.          COMPARISON:  None.      ACCESSION NUMBER(S):  [id number]      ORDERING CLINICIAN:  KUN GARDNER      FINDINGS:  CHEST AP PORTABLE SUPINE      An endotracheal tube is present with its tip about 5.3 cm above the  fredo. A nasogastric tube is seen with its tip in the fundus of the  stomach. Overlying monitoring leads present. Cardiac size normal for  the AP supine projection. The lungs are clear. The pulmonary  vascularity is normal. The lateral costophrenic angles are clear. No  mediastinal widening. No pneumothorax      Impression: Endotracheal tube in good position 5 cm above the fredo.  Nasogastric tube present.  No infiltrate, effusion or evidence of CHF.          MACRO:  None      Signed by: Felipe Mondragon 9/5/2024 7:56 AM  Dictation workstation:   [id number]      Physical Exam  Constitutional:       Comments: Ventilated on spontaneous respiration    HENT:      Head: Normocephalic and atraumatic.      Comments: Opens eyes on name calling     Right Ear: Tympanic membrane normal.      Left Ear: Tympanic membrane normal.      Nose: Nose normal.      Mouth/Throat:      Mouth: Mucous membranes are moist.   Eyes:      Pupils: Pupils are equal, round, and reactive to light.   Cardiovascular:      Rate and Rhythm: Normal rate and regular rhythm.      Pulses: Normal pulses.      Heart sounds: Normal heart sounds.   Pulmonary:      Effort: Pulmonary effort is normal.      Breath sounds: Normal breath sounds.   Abdominal:      General: Abdomen is flat.      Palpations: Abdomen is soft.   Musculoskeletal:         General: Normal range of motion.      Comments: Stage I sacral ulcer on bilateral buttocks   Neurological:      General: No focal deficit present.   Psychiatric:         Mood and Affect: Mood normal.         Relevant Results    Scheduled medications  amLODIPine, 5 mg, nasogastric tube, Daily  bisacodyl, 10 mg, rectal, Daily  carvedilol, 3.125 mg, oral, BID  famotidine, 20 mg, nasogastric tube, Daily  heparin (porcine), 5,000 Units, subcutaneous, q8h [facility]  mupirocin, 1 Application, Topical, BID  nystatin, 1 Application, Topical, BID  oxygen, , inhalation, Continuous - Inhalation  perflutren protein A microsphere, 0.5 mL, intravenous, Once in imaging  sulfur hexafluoride microsphr, 2 mL, intravenous, Once in imaging      Continuous medications  propofol, 0-50 mcg/kg/min, Last Rate: 25 mcg/kg/min (09/05/24 1450)      PRN medications  PRN medications: acetaminophen **OR** acetaminophen **OR** acetaminophen, loperamide, magnesium hydroxide, ondansetron ODT **OR** ondansetron, propofol, zinc oxide         Scheduled medications  amLODIPine, 5 mg, nasogastric tube, Daily  bisacodyl, 10 mg, rectal, Daily  carvedilol, 3.125 mg, oral, BID  famotidine, 20 mg, nasogastric tube, Daily  heparin (porcine), 5,000 Units, subcutaneous, q8h [facility]  mupirocin, 1 Application, Topical,  BID  nystatin, 1 Application, Topical, BID  oxygen, , inhalation, Continuous - Inhalation  perflutren protein A microsphere, 0.5 mL, intravenous, Once in imaging  sulfur hexafluoride microsphr, 2 mL, intravenous, Once in imaging      Continuous medications  propofol, 0-50 mcg/kg/min, Last Rate: 25 mcg/kg/min (09/05/24 1450)      PRN medications  PRN medications: acetaminophen **OR** acetaminophen **OR** acetaminophen, loperamide, magnesium hydroxide, ondansetron ODT **OR** ondansetron, propofol, zinc oxide        Assessment/Plan   This patient currently has cardiac telemetry ordered; if you would like to modify or discontinue the telemetry order, click here to go to the orders activity to modify/discontinue the order.      This patient has a urinary catheter   Reason for the urinary catheter remaining today?   This patient is intubated   Reason for patient to remain intubated today? we are providing ongoing neuro resuscitation    Scheduled medications  amLODIPine, 5 mg, nasogastric tube, Daily  bisacodyl, 10 mg, rectal, Daily  carvedilol, 3.125 mg, oral, BID  famotidine, 20 mg, nasogastric tube, Daily  heparin (porcine), 5,000 Units, subcutaneous, q8h [facility]  mupirocin, 1 Application, Topical, BID  nystatin, 1 Application, Topical, BID  oxygen, , inhalation, Continuous - Inhalation  perflutren protein A microsphere, 0.5 mL, intravenous, Once in imaging  sulfur hexafluoride microsphr, 2 mL, intravenous, Once in imaging      Continuous medications  propofol, 0-50 mcg/kg/min, Last Rate: 25 mcg/kg/min (09/05/24 1450)      PRN medications  PRN medications: acetaminophen **OR** acetaminophen **OR** acetaminophen, loperamide, magnesium hydroxide, ondansetron ODT **OR** ondansetron, propofol, zinc oxide      Assessment & Plan    49-year-old obese female with a past medical history of bipolar disorder, dermatofibroma of the left arm, fibromyalgia, chronic pain, nonalcoholic fatty liver disease, and vitamin D deficiency who was  brought by the EMS to the emergency room after being found unresponsive by her boyfriend. EMS noted that boyfriend was very aggressive on scene and placed into police custody. In the ER, patient was found to have a sepsis [leukocytosis, hypothermia, lactic acidosis] associated with acute kidney injury, hypercapnic respiratory failure with respiratory acidosis, hypocalcemia, transaminitis, rhabdomyolysis, and elevated troponin. All in the setting of an underlying possible aspiration pneumonia of gram-negative bacteria origin.      Assessment   Ventilator associated pneumonia  Acute metabolic encephalopathy s/p trach   Acute hypoxic aspiratory failure requiring intubation  Aspiration pneumonia  Drug overdose  NSTEMI type II likely secondary demand ischemia  Acute renal failure  Anuria  Rhabdomyolysis resolved   Hypokalemia  Hypocalcemia  Shock liver  Leukocytosis  Sacral ulcer    Plan    Patient had tracheostomy placed today 09/04/2024  She had PEG tube placed on 09/05/2024  Will start PEG tube feeding with Jevity 1.5  Nutrition to to give recommendations for PEG tube feeds  Patient started on Ancef  Nasal decolonization with mupirocin  Continue vent management per ICU  Continue tube feeds  Continue f/ kidney functions  Replete electrolytes as needed  Garcia care  Plan for SNF placement  Social work to assist in discharge planning    DVT prophylaxis: Heparin  GI prophylaxis: Famotidine  Full code            Junaid Rosenberg MD

## 2024-09-05 NOTE — PROGRESS NOTES
Subjective Data:  Patient remains unresponsive at this point. Hemodynamically stable.     Overnight Events:    No     Objective Data:  Last Recorded Vitals:  Vitals:    09/05/24 0850 09/05/24 0855 09/05/24 0900 09/05/24 0905   BP: 110/72 103/77 111/84 123/89   BP Location:  Left arm     Patient Position:  Lying     Pulse: 107 104 108 108   Resp: 22 19 18 22   Temp:       TempSrc:       SpO2: 96% 97% 96% 97%   Weight:       Height:           Last Labs:  CBC - 9/5/2024:  4:33 AM  12.4 13.5 397    41.6      CMP - 9/4/2024:  4:25 AM  9.2 7.1 22 --- 0.6   3.8 3.6 37 94      PTT - No results in last year.  _   _ _     TROPHS   Date/Time Value Ref Range Status   08/27/2024 05:08 AM 87 0 - 13 ng/L Final   08/24/2024 04:39  0 - 13 ng/L Final   08/23/2024 03:57 AM 1,624 0 - 13 ng/L Final     HGBA1C   Date/Time Value Ref Range Status   12/15/2020 09:58 AM 5.1 4.0 - 5.6 % Final      Last I/O:  I/O last 3 completed shifts:  In: 3064.9 (39.6 mL/kg) [I.V.:276.9 (3.6 mL/kg); NG/GT:2788]  Out: 2755 (35.6 mL/kg) [Urine:2755 (1 mL/kg/hr)]  Weight: 77.4 kg     Past Cardiology Tests (Last 3 Years):  EKG:  ECG 12 lead 08/21/2024    Echo:  Transthoracic Echo (TTE) Limited 08/22/2024    Ejection Fractions:  EF   Date/Time Value Ref Range Status   08/22/2024 06:00 AM 40 %      Cath:  No results found for this or any previous visit from the past 1095 days.    Stress Test:  No results found for this or any previous visit from the past 1095 days.    Cardiac Imaging:  XR chest abdomen for OG NG placement 08/21/2024      Inpatient Medications:  Scheduled medications   Medication Dose Route Frequency    amLODIPine  5 mg nasogastric tube Daily    bisacodyl  10 mg rectal Daily    carvedilol  3.125 mg oral BID    ceFAZolin  1 g intravenous Once    famotidine  20 mg nasogastric tube Daily    heparin (porcine)  5,000 Units subcutaneous q8h [facility]    mupirocin  1 Application Topical BID    nystatin  1 Application Topical BID    oxygen   inhalation  Continuous - Inhalation    perflutren protein A microsphere  0.5 mL intravenous Once in imaging    sulfur hexafluoride microsphr  2 mL intravenous Once in imaging     PRN medications   Medication    acetaminophen    Or    acetaminophen    Or    acetaminophen    loperamide    magnesium hydroxide    ondansetron ODT    Or    ondansetron    propofol    propofol    zinc oxide     Continuous Medications   Medication Dose Last Rate    propofol  0-50 mcg/kg/min 24.978 mcg/kg/min (09/05/24 0908)       Physical Exam:  General: Unconscious, intubated  Neck: supple; trachea midline; no masses; no JVD; S/P tracheostomy  Chest: clear breath sounds bilaterally; no wheezing; on Mechanical ventilation  Cardio: regular rhythm, S1S2 normal, no murmurs  Abdomen: Soft, non-tender, non-distension, no organomegaly  Extremities: no clubbing/cyanosis/edema     Assessment/Plan     Mrs. Fidelia Santiago is a 49 y.o. non-smoker female being consulted by the Cardiology team for troponin leak. Patient with prior medical history significant for bipolar disorder, dermatofibroma of the left arm, fibromyalgia, chronic pain, nonalcoholic fatty liver disease, and vitamin D deficiency. Patient intubated and cannot give reliable information at this point. History has been obtained from previous records. Per chart review,   she was brought by the EMS to the emergency room after being found unresponsive by her boyfriend. EMS noted that boyfriend was very aggressive on scene and placed into police custody. She was found unresponsive and emergently intubated by the EMS in the field. On presentation to the ER, blood pressure 118/101, respiratory rate 30, temperature 34.3, heart rate 92. Pertinent findings on blood workup; WBC 13,000, troponin 670 --> 885, potassium 7.4, creatinine 2.94, calcium 7.3, alkaline phosphatase 216, ALT 1750, AST 2674, creatinine kinase 1830 and lactic acid 2.9 ABG showed a pH of 7.10, pCO2 of 63 and pO2 of 80. U tox screen came  back negative. Urinalysis came back grossly within normal limits. CT scan of the chest abdomen and pelvis showed bilateral lower lobe infiltrates compatible with pneumonia presumably aspiration. There is also bilateral perinephric stranding. And constipation. EMS gave the patient on the way 4 doses of Narcan without improvement in consciousness. Patient was given in the emergency room IV fluids, ceftriaxone, azithromycin, DuoNebs, insulin, sodium bicarbonate. Patient was found to have a sepsis [leukocytosis, hypothermia, lactic acidosis] associated with acute kidney injury, hypercapnic respiratory failure with respiratory acidosis, hypocalcemia, transaminitis, rhabdomyolysis, and elevated troponin. All in the setting of an underlying possible aspiration pneumonia of gram-negative bacteria origin. She has been admitted for clinical compensation.     Assessment     # Troponin Elevation  - Trop 670 --> 885 --> 1,624 --> 713 --> 87.  - Troponin elevation is likely attributable to non-ischemic myocardial injury due to myocardial imbalance in oxygen supply/demand secondary to underlying rhabdomyolysis.   - Would suggest to continue current medical management per primary team.    - Hydration.  - Echo (08/22/2024):   1. The left ventricular systolic function is moderately decreased, with a visually estimated ejection fraction of 40%.   2. There is global hypokinesis of the left ventricle with minor regional variations.   3. Spectral Doppler shows a pseudonormal pattern of left ventricular diastolic filling.   4. There is reduced right ventricular systolic function  - Once patient is more stable, we can discuss options to rule out cardiac ischemia.     # Rhabdomyolysis / HERI  - Crea 3.4 --> 5.8 --> 3.15 --> 2.46 --> 1.08  - ALT 1376 --> 296  - AST 1494 --> 64  - Would suggest to follow Nephrology recs.     # Aspirative Pneumonia S/P Tracheostomy  - Medically managed.  - Would suggest to titrate hydration according to  clinical status.       This critically ill patient continues to be at-risk for clinically significant deterioration / failure due to the above mentioned dysfunctional, unstable organ systems.  I have personally identified and managed all complex critical care issues to prevent aforementioned clinical deterioration.  Critical care time is spent at bedside and/or the immediate area and has included, but is not limited to, the review of diagnostic tests, labs, radiographs, serial assessments of hemodynamics, respiratory status, ventilatory management, and family updates.  Time spent in procedures and teaching are reported separately.     Critical care time: 50 minutes    Peripheral IV 08/21/24 20 G Left Antecubital (Active)   Site Assessment Clean;Dry;Intact 09/05/24 0730   Dressing Type Transparent 09/05/24 0730   Line Status Saline locked 09/05/24 0730   Dressing Status Clean;Dry 09/05/24 0730   Number of days: 15       Peripheral IV 08/21/24 16 G Right Antecubital (Active)   Site Assessment Clean;Dry;Intact 09/05/24 0730   Dressing Type Transparent 09/05/24 0730   Line Status Infusing 09/05/24 0730   Dressing Status Clean;Dry 09/05/24 0730   Number of days: 15       Surgical Airway Shiley Cuffed 8 (Active)   Status Secured 09/05/24 0730   Site Assessment Clean;Dry 09/05/24 0730   Ties Assessment Clean;Dry;Intact 09/05/24 0730   Backup Trach at Bedside Yes 09/05/24 0730   Number of days: 1       Urethral Catheter Straight-tip 16 Fr. (Active)   Site Assessment Clean;Skin intact 09/05/24 0730   Collection Container Standard drainage bag 09/05/24 0730   Securement Method Leg strap 09/05/24 0730   Reason for Continuing Urinary Catheterization accurate hourly measurement of urine volume in a critically ill patient that cannot be assessed by other volumes and urine collection strategies 09/05/24 0730   Number of days: 15     Code Status:  Full Code    Jason Berman MD  Cardiology

## 2024-09-05 NOTE — CARE PLAN
Problem: Mechanical Ventilation  Goal: Ability to express needs and understand communication  Outcome: Not Progressing     Problem: Discharge Planning  Goal: Discharge to home or other facility with appropriate resources  Outcome: Progressing     Problem: Mechanical Ventilation  Goal: Mobility/activity is maintained at optimum level for patient  Outcome: Progressing  Goal: Patient Will Maintain Patent Airway  Outcome: Progressing  Goal: Oral health is maintained or improved  Outcome: Progressing     Problem: Skin  Goal: Decreased wound size/increased tissue granulation at next dressing change  Outcome: Progressing  Goal: Participates in plan/prevention/treatment measures  Outcome: Progressing  Goal: Prevent/manage excess moisture  Outcome: Progressing  Goal: Prevent/minimize sheer/friction injuries  Outcome: Progressing  Goal: Promote/optimize nutrition  Outcome: Progressing  Goal: Promote skin healing  Outcome: Progressing     Problem: Fall/Injury  Goal: Not fall by end of shift  Outcome: Progressing  Goal: Verbalize understanding of personal risk factors for fall in the hospital  Outcome: Progressing  Goal: Verbalize understanding of risk factor reduction measures to prevent injury from fall in the home  Outcome: Progressing  Goal: Pace activities to prevent fatigue by end of the shift  Outcome: Progressing     Problem: Genitourinary - Adult  Goal: Absence of urinary retention  Outcome: Progressing  Goal: Urinary catheter remains patent  Outcome: Progressing     Problem: Metabolic/Fluid and Electrolytes - Adult  Goal: Electrolytes maintained within normal limits  Outcome: Progressing  Goal: Hemodynamic stability and optimal renal function maintained  Outcome: Progressing  Goal: Glucose maintained within prescribed range  Outcome: Progressing     Problem: Respiratory  Goal: Clear secretions with interventions this shift  Outcome: Progressing  Goal: Minimize anxiety/maximize coping throughout shift  Outcome:  Progressing  Goal: Minimal/no exertional discomfort or dyspnea this shift  Outcome: Progressing  Goal: No signs of respiratory distress (eg. Use of accessory muscles. Peds grunting)  Outcome: Progressing  Goal: Patent airway maintained this shift  Outcome: Progressing  Goal: Tolerate mechanical ventilation evidenced by VS/agitation level this shift  Outcome: Progressing  Goal: Tolerate pulmonary toileting this shift  Outcome: Progressing  Goal: Wean oxygen to maintain O2 saturation per order/standard this shift  Outcome: Progressing  Goal: Increase self care and/or family involvement in next 24 hours  Outcome: Progressing     Problem: Pain  Goal: Turns in bed with improved pain control throughout the shift  Outcome: Progressing    The clinical goals for the shift include tolerate peg tube placement, VSS      Problem: Mechanical Ventilation  Goal: Ability to express needs and understand communication  Outcome: Not Progressing

## 2024-09-05 NOTE — PROCEDURES
PEG tube placement  Reason: Need for nutritional therapy  Consent: Obtained from father  Timeout: Performed at bedside with Alaina and Dr. Christine    Procedure:  She is currently on propofol for sedation and propofol was continued and she was given a couple boluses  Please see Dr. Christine note for endoscopy  When the endoscope was in the stomach we were able to see a light reflex and a good deflection  Lidocaine was used to anesthetize the area  A large bore needle was then inserted under direct visualization  A wire was floated down through the catheter after the needle was removed and the wire was snared  The wire was then pulled up and out through the mouth  The PEG tube was then pulled down and back out through the abdominal wall  Endoscopy showed it to be in good position  With the lidocaine she did have a little bit of bleeding  This was easily stopped  There was no other bleeding noted  The tube was secured in place with no adverse events noted  Tube could now be used for nutritional therapy and oral needs

## 2024-09-05 NOTE — NURSING NOTE
Pt's , Shyann, called in for update on pt condition. Update given, informed her of plan for pt's discharge. Notified , Mary, of call and discussion.

## 2024-09-05 NOTE — CARE PLAN
Problem: Discharge Planning  Goal: Discharge to home or other facility with appropriate resources  Outcome: Progressing     Problem: Skin  Goal: Participates in plan/prevention/treatment measures  Flowsheets (Taken 9/5/2024 0519)  Participates in plan/prevention/treatment measures: Elevate heels  Goal: Prevent/manage excess moisture  Flowsheets (Taken 9/5/2024 0519)  Prevent/manage excess moisture: Cleanse incontinence/protect with barrier cream  Goal: Prevent/minimize sheer/friction injuries  Flowsheets (Taken 9/5/2024 0519)  Prevent/minimize sheer/friction injuries: Turn/reposition every 2 hours/use positioning/transfer devices     Problem: Fall/Injury  Goal: Be free from injury by end of the shift  Outcome: Met     Problem: Respiratory - Adult  Goal: Achieves optimal ventilation and oxygenation  Outcome: Met   The patient's goals for the shift include      The clinical goals for the shift include maintain patent trach, VSS  Goal met

## 2024-09-05 NOTE — PROGRESS NOTES
Fidelia Santiago is a 49 y.o. female on day 14 of admission presenting with No Principal Problem: There is no principal problem currently on the Problem List. Please update the Problem List and refresh..      Subjective   Patient seen and examined at the bedside this morning  She is resting comfortably in bed currently sedated  Tracheostomy appears to be good at this time         Objective          Vitals 24HR  Heart Rate:  []   Temp:  [35.7 °C (96.3 °F)-37 °C (98.6 °F)]   Resp:  [13-24]   BP: ()/(65-91)   SpO2:  [94 %-100 %]         Intake/Output last 3 Shifts:    Intake/Output Summary (Last 24 hours) at 9/5/2024 0957  Last data filed at 9/5/2024 0800  Gross per 24 hour   Intake 303.52 ml   Output 955 ml   Net -651.48 ml       Physical Exam  Constitutional:       Comments: Sedated this a.m. and appears comfortable   HENT:      Head: Normocephalic and atraumatic.      Right Ear: External ear normal.      Left Ear: External ear normal.      Nose: Nose normal.      Mouth/Throat:      Mouth: Mucous membranes are moist.      Pharynx: Oropharynx is clear.   Eyes:      Comments: Eyes closed   Neck:      Comments: Tracheostomy present  Cardiovascular:      Rate and Rhythm: Regular rhythm. Tachycardia present.   Pulmonary:      Effort: Pulmonary effort is normal.      Breath sounds: Normal breath sounds.   Abdominal:      General: Abdomen is flat.      Palpations: Abdomen is soft.      Comments: PEG tube in place   Genitourinary:     Comments: Garcia catheter in place  Skin:     General: Skin is warm and dry.   Neurological:      Comments: Gag reflex present  Breathing over the ventilator  Sedated this a.m.         Relevant Results               Assessment/Plan   This patient currently has cardiac telemetry ordered; if you would like to modify or discontinue the telemetry order, click here to go to the orders activity to modify/discontinue the order.          Assessment & Plan    Anoxic brain injury  Comatose  state  Drug overdose with unknown downtime  Systolic congestive heart failure biventricular  Leukocytosis: Improving  Diarrhea: Improved    Plan:  PEG tube placed today  She can start getting her enteral nutrition through the PEG tube along with free water flushes  She can be ready for discharge  I discussed with pharmacy and we will proceed with MRSA decolonization  Her sputum culture is predominantly lower respiratory tract secretions there is a predominant gram-negative bacilli we will follow and wait at this time  Currently does not show signs of infection her bronchoscopy yesterday looked very unremarkable  When a spot is ready for discharge she can be discharged at any time  I spent 30 minutes of critical care time directly involved in patient care excluding any billable procedures              Tomás Randall, DO

## 2024-09-05 NOTE — PROGRESS NOTES
"Per medical team, patient's PEG tube was placed successfully this morning. Per [facility], [facility] has submitted for insurance auth which is still pending. Patient could be medically ready for discharge to [facility] tomorrow, but awaits insurance auth.  went to patient's bedside to speak to patient and assure patient that the [facility] team is still working toward a good plan for patient. Patient continues to be unresponsive and how has trach and PEG placed. Per bedside nurse, patient's named emergency contact/Le phoned and expressed concern that patient's father is listed as patient's only next-of-kin. YULISSA spoke with  Ethicist about reasons TOMI/Shyann gave for concern. TOMI/Shyann denied any legal proceedings or formal orders prohibiting patient's father to function as next of kin.  Ethicist suggested that [facility] team continue to try to reach any of patient's adult children to be certain that they are not willing to be involved in any decision-making on patient's behalf, as adult children would be considered next of kin before patient's father. YULISSA attempted to phone patient's dtr/Stephanie Santiago ([phone]) but there was no response, and message stated that patient's \"voice mailbox is full\". SW sent patient's dtr a text message through  cell phone and awaits response. SW received a call back from patient's father stating that patient's adult son/Bassem who grew up in [place] was raised by paternal grandparents with last name \"Moscoso\" or it sounded like that. Paternal grandfather was called \"Thierno\" per patient's father/Richi. SW/DSC to continue to send updated notes to [facility] via [facility] as notes available. Plan for patient to discharge to ([facility]) [facility] when medically ready, pending insurance auth. Care Transitions to follow and assist. VERNA Garcia     "

## 2024-09-06 VITALS
HEART RATE: 118 BPM | HEIGHT: 64 IN | BODY MASS INDEX: 27.48 KG/M2 | SYSTOLIC BLOOD PRESSURE: 129 MMHG | DIASTOLIC BLOOD PRESSURE: 86 MMHG | OXYGEN SATURATION: 98 % | TEMPERATURE: 97 F | RESPIRATION RATE: 18 BRPM | WEIGHT: 160.94 LBS

## 2024-09-06 LAB
ANION GAP SERPL CALC-SCNC: 16 MMOL/L (ref 10–20)
BACTERIA SPEC RESP CULT: ABNORMAL
BUN SERPL-MCNC: 44 MG/DL (ref 6–23)
CALCIUM SERPL-MCNC: 9.5 MG/DL (ref 8.6–10.3)
CHLORIDE SERPL-SCNC: 112 MMOL/L (ref 98–107)
CO2 SERPL-SCNC: 19 MMOL/L (ref 21–32)
CREAT SERPL-MCNC: 1.26 MG/DL (ref 0.5–1.05)
EGFRCR SERPLBLD CKD-EPI 2021: 52 ML/MIN/1.73M*2
ERYTHROCYTE [DISTWIDTH] IN BLOOD BY AUTOMATED COUNT: 12.9 % (ref 11.5–14.5)
GLUCOSE BLD MANUAL STRIP-MCNC: 101 MG/DL (ref 74–99)
GLUCOSE BLD MANUAL STRIP-MCNC: 146 MG/DL (ref 74–99)
GLUCOSE SERPL-MCNC: 125 MG/DL (ref 74–99)
GRAM STN SPEC: ABNORMAL
GRAM STN SPEC: ABNORMAL
HCT VFR BLD AUTO: 43.5 % (ref 36–46)
HGB BLD-MCNC: 14 G/DL (ref 12–16)
MCH RBC QN AUTO: 29.9 PG (ref 26–34)
MCHC RBC AUTO-ENTMCNC: 32.2 G/DL (ref 32–36)
MCV RBC AUTO: 93 FL (ref 80–100)
NRBC BLD-RTO: 0 /100 WBCS (ref 0–0)
PLATELET # BLD AUTO: 431 X10*3/UL (ref 150–450)
POTASSIUM SERPL-SCNC: 3.9 MMOL/L (ref 3.5–5.3)
RBC # BLD AUTO: 4.69 X10*6/UL (ref 4–5.2)
SODIUM SERPL-SCNC: 143 MMOL/L (ref 136–145)
WBC # BLD AUTO: 14.1 X10*3/UL (ref 4.4–11.3)

## 2024-09-06 PROCEDURE — 80048 BASIC METABOLIC PNL TOTAL CA: CPT | Performed by: INTERNAL MEDICINE

## 2024-09-06 PROCEDURE — 2500000001 HC RX 250 WO HCPCS SELF ADMINISTERED DRUGS (ALT 637 FOR MEDICARE OP)

## 2024-09-06 PROCEDURE — 94762 N-INVAS EAR/PLS OXIMTRY CONT: CPT

## 2024-09-06 PROCEDURE — 94003 VENT MGMT INPAT SUBQ DAY: CPT

## 2024-09-06 PROCEDURE — 2500000001 HC RX 250 WO HCPCS SELF ADMINISTERED DRUGS (ALT 637 FOR MEDICARE OP): Performed by: INTERNAL MEDICINE

## 2024-09-06 PROCEDURE — 99291 CRITICAL CARE FIRST HOUR: CPT | Performed by: STUDENT IN AN ORGANIZED HEALTH CARE EDUCATION/TRAINING PROGRAM

## 2024-09-06 PROCEDURE — 99291 CRITICAL CARE FIRST HOUR: CPT | Performed by: INTERNAL MEDICINE

## 2024-09-06 PROCEDURE — 2500000005 HC RX 250 GENERAL PHARMACY W/O HCPCS: Performed by: INTERNAL MEDICINE

## 2024-09-06 PROCEDURE — 2500000004 HC RX 250 GENERAL PHARMACY W/ HCPCS (ALT 636 FOR OP/ED): Performed by: INTERNAL MEDICINE

## 2024-09-06 PROCEDURE — 36415 COLL VENOUS BLD VENIPUNCTURE: CPT | Performed by: INTERNAL MEDICINE

## 2024-09-06 PROCEDURE — 82947 ASSAY GLUCOSE BLOOD QUANT: CPT

## 2024-09-06 PROCEDURE — 2500000001 HC RX 250 WO HCPCS SELF ADMINISTERED DRUGS (ALT 637 FOR MEDICARE OP): Performed by: HOSPITALIST

## 2024-09-06 PROCEDURE — 85027 COMPLETE CBC AUTOMATED: CPT | Performed by: INTERNAL MEDICINE

## 2024-09-06 PROCEDURE — 99239 HOSP IP/OBS DSCHRG MGMT >30: CPT | Performed by: STUDENT IN AN ORGANIZED HEALTH CARE EDUCATION/TRAINING PROGRAM

## 2024-09-06 PROCEDURE — 31720 CLEARANCE OF AIRWAYS: CPT

## 2024-09-06 PROCEDURE — 87077 CULTURE AEROBIC IDENTIFY: CPT | Mod: SAMLAB | Performed by: INTERNAL MEDICINE

## 2024-09-06 RX ORDER — FAMOTIDINE 20 MG/1
20 TABLET, FILM COATED ORAL DAILY
Qty: 30 TABLET | Refills: 0 | Status: SHIPPED | OUTPATIENT
Start: 2024-09-07 | End: 2024-10-07

## 2024-09-06 RX ORDER — LEVOFLOXACIN 500 MG/1
750 TABLET, FILM COATED ORAL DAILY
Qty: 8 TABLET | Refills: 0 | Status: SHIPPED | OUTPATIENT
Start: 2024-09-06 | End: 2024-09-11

## 2024-09-06 RX ORDER — AMLODIPINE BESYLATE 5 MG/1
5 TABLET ORAL DAILY
Qty: 30 TABLET | Refills: 0 | Status: SHIPPED | OUTPATIENT
Start: 2024-09-07 | End: 2024-10-07

## 2024-09-06 RX ORDER — ZINC OXIDE 20 G/100G
1 OINTMENT TOPICAL
Qty: 100 G | Refills: 0 | Status: SHIPPED | OUTPATIENT
Start: 2024-09-06

## 2024-09-06 RX ORDER — NYSTATIN 100000 [USP'U]/G
1 POWDER TOPICAL 2 TIMES DAILY
Qty: 100 G | Refills: 0 | Status: SHIPPED | OUTPATIENT
Start: 2024-09-06 | End: 2024-10-06

## 2024-09-06 RX ORDER — MUPIROCIN 20 MG/G
1 OINTMENT TOPICAL 2 TIMES DAILY
Qty: 1 G | Refills: 0 | Status: SHIPPED | OUTPATIENT
Start: 2024-09-06 | End: 2024-09-10

## 2024-09-06 RX ORDER — CARVEDILOL 3.12 MG/1
3.12 TABLET ORAL 2 TIMES DAILY
Qty: 60 TABLET | Refills: 0 | Status: SHIPPED | OUTPATIENT
Start: 2024-09-06 | End: 2024-10-06

## 2024-09-06 ASSESSMENT — COGNITIVE AND FUNCTIONAL STATUS - GENERAL
CLIMB 3 TO 5 STEPS WITH RAILING: TOTAL
MOVING TO AND FROM BED TO CHAIR: TOTAL
HELP NEEDED FOR BATHING: TOTAL
WALKING IN HOSPITAL ROOM: TOTAL
CLIMB 3 TO 5 STEPS WITH RAILING: TOTAL
TURNING FROM BACK TO SIDE WHILE IN FLAT BAD: TOTAL
TURNING FROM BACK TO SIDE WHILE IN FLAT BAD: TOTAL
MOVING FROM LYING ON BACK TO SITTING ON SIDE OF FLAT BED WITH BEDRAILS: TOTAL
TOILETING: TOTAL
EATING MEALS: TOTAL
PERSONAL GROOMING: TOTAL
DRESSING REGULAR UPPER BODY CLOTHING: TOTAL
STANDING UP FROM CHAIR USING ARMS: TOTAL
STANDING UP FROM CHAIR USING ARMS: TOTAL
MOVING FROM LYING ON BACK TO SITTING ON SIDE OF FLAT BED WITH BEDRAILS: TOTAL
DRESSING REGULAR LOWER BODY CLOTHING: TOTAL
MOVING TO AND FROM BED TO CHAIR: TOTAL
WALKING IN HOSPITAL ROOM: TOTAL
DAILY ACTIVITIY SCORE: 6
MOBILITY SCORE: 6
MOBILITY SCORE: 6

## 2024-09-06 ASSESSMENT — PAIN - FUNCTIONAL ASSESSMENT
PAIN_FUNCTIONAL_ASSESSMENT: CPOT (CRITICAL CARE PAIN OBSERVATION TOOL)

## 2024-09-06 NOTE — PROGRESS NOTES
Per Dr. Randall, patient is medically appropriate for discharge today after assurance from [facility] ([facility]) that they will continue to be able to manage patient's needs closely, including some new bleeding at the site of patient's new trach placement. Per Henok/Joan ([phone]) [facility] has received confirmation of insurance auth, and they are ready to receive patient today.  went to patient's bedside to assure patient that the  team is continuing to work toward patient's care and comfort. Patient remains unresponsive. SW to phone patient's family to review plan. SW/DSC to send updated notes and final orders to [facility] via [facility] when complete. No HENS document needed for LTACH. Nursing to confirm transport.  - 1525: [facility] what is needed for patient's admission. SW updated patient's dtr/Stephanie via text; attempted to call but no answer, no opportunity to leave a message. YULISSA phoned patient's father/Richi to update him re: above. Patient's father expressed understanding and agreement. YULISSA phoned and updated patient's Carmela CM/Shyann Arroyo. SW passed numbers to [facility] admissions for patient's dtr, father, and CM. Plan for patient is to discharge to [facility] ([facility]) in [place] today. No further Care Transitions needs foreseen. Care Transitions available upon request. VERNA Garcia

## 2024-09-06 NOTE — DISCHARGE INSTRUCTIONS
Garcia catheter care twice a day with soap and water.     Peg tube dressing change daily, clean with soap and water.

## 2024-09-06 NOTE — DISCHARGE SUMMARY
Discharge Diagnosis  Anoxic brain injury, status post tracheostomy PEG tube placement    Issues Requiring Follow-Up      Discharge Meds     Medication List      START taking these medications     amLODIPine 5 mg tablet; Commonly known as: Norvasc; 1 tablet (5 mg) by   g-tube route once daily.; Start taking on: September 7, 2024   carvedilol 3.125 mg tablet; Commonly known as: Coreg; Take 1 tablet   (3.125 mg) by mouth 2 times a day.   famotidine 20 mg tablet; Commonly known as: Pepcid; 1 tablet (20 mg) by   nasogastric tube route once daily.; Start taking on: September 7, 2024   levoFLOXacin 500 mg tablet; Commonly known as: Levaquin; Take 1.5   tablets (750 mg) by mouth once daily for 5 days.   mupirocin 2 % ointment; Commonly known as: Bactroban; Apply 1   Application topically 2 times a day for 7 doses.   nystatin 100,000 unit/gram powder; Commonly known as: Mycostatin; Apply   1 Application topically 2 times a day.   zinc oxide 20 % ointment; Apply 1 Application topically every 1 hour if   needed for dry skin.     CONTINUE taking these medications     cholecalciferol 50 MCG (2000 UT) tablet; Commonly known as: Vitamin D-3     STOP taking these medications     bisacodyl 5 mg EC tablet; Commonly known as: Dulcolax   buPROPion 75 mg tablet; Commonly known as: Wellbutrin   dicyclomine 10 mg capsule; Commonly known as: Bentyl   lamoTRIgine 100 mg tablet; Commonly known as: LaMICtal   multivitamin tablet   omeprazole 40 mg DR capsule; Commonly known as: PriLOSEC   polyethylene glycol 17 gram packet; Commonly known as: Glycolax, Miralax   QUEtiapine 100 mg tablet; Commonly known as: SEROquel   trihexyphenidyl 2 mg tablet; Commonly known as: Artane       Test Results Pending At Discharge  Pending Labs       Order Current Status    Blood Culture Preliminary result    Blood Culture Preliminary result            Hospital Course     Expand All Collapse All    History Of Present Illness  Fidelia Santiago is a 49 y.o. female   Who was brought by the EMS to the emergency room after being found unresponsive.  She was emergently intubated by the EMS in the field.  On presentation to the ER, blood pressure 118/101, respiratory rate 30, temperature 34.3, heart rate 92.   Pertinent findings on blood workup; WBC 13,000, troponin 670, potassium 7.4, creatinine 2.94, calcium 7.3, alkaline phosphatase 216, ALT 1750, AST 2674, creatinine kinase 1830 and lactic acid 2.9 ABG showed a pH of 7.10, pCO2 of 63 and pO2 of 80.  U tox screen came back negative.  Urinalysis came back grossly within normal limits.  CT scan of the chest abdomen and pelvis showed bilateral lower lobe infiltrates compatible with pneumonia presumably aspiration.  There is also bilateral perinephric stranding.  And constipation.  EMS gave the patient on the way 4 doses of Narcan without improvement in consciousness.  Patient was given in the emergency room IV fluids, ceftriaxone, azithromycin, DuoNebs, insulin, sodium bicarbonate.  She was then admitted to the medical service for further investigation and management.    During the course in the hospital patient was intubated and was managed with mechanical ventilation.  Patient had HERI from rhabdomyolysis.  She was initially treated with sodium bicarb drip for rhabdomyolysis and also fluid resuscitation.  Levels were monitored frequently.  She was placed on broad-spectrum antibiotics for possible pneumonia.  Patient started having a good urine output in 24 to 48 hours.  She was placed on supportive treatment with PPI, heparin.  24 to 48 hours into the brain injury patient exhibited signs of anoxic brain injury.  She was more posturing.  She was started on Keppra.  Patient had a neurological evaluation with EEG.  She was started on tube feeds simultaneously   EEG showed diffuse encephalopathy.  Patient had no neurological recovery during this time.  She had mild opening of eyes but unable to respond.  She was on spontaneous  respiration and emaciated.  Family was discussed with the option and at this time they want to go ahead with tracheostomy and PEG tube placement.  Patient had a tracheostomy September 9, 2024.  She had a PEG tube placed on September 5, 2024.  Patient's course was otherwise complicated by hyponatremia.  Resolved with feeding and fluids.  Patient recorded urine output as she also had a Garcia.  Patient had a G-tube feeds started.  Patient is tolerating it well.  Patient did have some fever end of her stay.  Chest x-ray showed pneumonic infiltrates and sputum culture for Pseudomonas.  She was placed on 5 days of levofloxacin.  She was having stage I sacral ulcer on the day of discharge.  Patient was discharged to long-term nursing care for further management she is full code..          Pertinent Physical Exam At Time of Discharge  Physical Exam  Expand All Collapse All    You are just sleepy, every week mostly this was the other day this is Fidelia Santiago is a 49 y.o. female on day 14 of admission presenting with No Principal Problem: There is no principal problem currently on the Problem List. Please update the Problem List and refresh..           Subjective  Patient seen and examined bedside.  S/p tracheostomy and peg tube placement          Objective[]Expand by Default  Last Recorded Vitals  /72   Pulse 106   Temp 36.5 °C (97.7 °F) (Temporal)   Resp 20   Wt 72.4 kg (159 lb 9.8 oz) Comment: SCD machine taken off bed for more accurate weight  SpO2 98%   Intake/Output last 3 Shifts:     Intake/Output Summary (Last 24 hours) at 9/5/2024 1717  Last data filed at 9/5/2024 1600      Gross per 24 hour   Intake 473.74 ml   Output 930 ml   Net -456.26 ml         Admission Weight  Weight: 79.4 kg (175 lb) (08/21/24 2007)     Daily Weight  09/05/24 : 72.4 kg (159 lb 9.8 oz)     Image Results  XR chest 1 view  Narrative: Interpreted By:  Felipe Mondragon,   STUDY:  XR CHEST 1 VIEW;  9/3/2024 11:48 am       INDICATION:  Signs/Symptoms:pneumonia.          COMPARISON:  None.      ACCESSION NUMBER(S):  [id number]      ORDERING CLINICIAN:  KUN GARDNER      FINDINGS:  CHEST AP PORTABLE SUPINE      An endotracheal tube is present with its tip about 5.3 cm above the  fredo. A nasogastric tube is seen with its tip in the fundus of the  stomach. Overlying monitoring leads present. Cardiac size normal for  the AP supine projection. The lungs are clear. The pulmonary  vascularity is normal. The lateral costophrenic angles are clear. No  mediastinal widening. No pneumothorax      Impression: Endotracheal tube in good position 5 cm above the fredo.  Nasogastric tube present.  No infiltrate, effusion or evidence of CHF.          MACRO:  None      Signed by: Felipe Mondragon 9/5/2024 7:56 AM  Dictation workstation:   [id number]        Physical Exam  Constitutional:       Comments: Ventilated on spontaneous respiration   HENT:      Head: Normocephalic and atraumatic.      Comments: Opens eyes on name calling     Right Ear: Tympanic membrane normal.      Left Ear: Tympanic membrane normal.      Nose: Nose normal.      Mouth/Throat:      Mouth: Mucous membranes are moist.   Eyes:      Pupils: Pupils are equal, round, and reactive to light.   Cardiovascular:      Rate and Rhythm: Normal rate and regular rhythm.      Pulses: Normal pulses.      Heart sounds: Normal heart sounds.   Pulmonary:      Effort: Pulmonary effort is normal. Tracheostomy present     Breath sounds: Normal breath sounds.   Abdominal:      General: Abdomen is flat. Peg tube+     Palpations: Abdomen is soft.   Musculoskeletal:         General: Normal range of motion.      Comments: Stage I sacral ulcer on bilateral buttocks   Neurological:      General: No focal deficit present.   Psychiatric:         Mood and Affect: Mood normal.         Outpatient Follow-Up  No future appointments.      Kun Gardner MD   range of motion is not limited and there is no muscle tenderness.

## 2024-09-06 NOTE — CONSULTS
"Nutrition Follow Up Note  Patient has Malnutrition Diagnosis:  (insufficient data)  Nutrition Assessment         9/6 Pt remains unresponsive, seen at bedside. No new changes to note. Pt tolerating TF formula. Wt down to 160 from 205 lbs. On 8/28. Wt gain from fluid as noted in last follow up assessment. Wt coming back down to baseline. Pt with successful tracheostomy. Pt plan still discharging to LTC facility. Continue to monitor.      Nutrition History:  Food and Nutrient History: Unable to assess nutrition status at this time     No Known Allergies   GI Symptoms: None     Oral Problems: None          Anthropometrics:  Height: 162.6 cm (5' 4\")   Weight: 73 kg (160 lb 15 oz)   BMI (Calculated): 27.61  IBW/kg (Dietitian Calculated): 54.5 kg          Weight History:     Weight         8/28/2024  0700 9/4/2024  0400 9/4/2024  0830 9/5/2024  1030 9/6/2024  0400    Weight: 93.4 kg (205 lb 14.6 oz) 77.4 kg (170 lb 10.2 oz) 77.4 kg (170 lb 10.2 oz) 72.4 kg (159 lb 9.8 oz) 73 kg (160 lb 15 oz)              Nutrition Significant Labs:    Results from last 7 days   Lab Units 09/06/24  0454 09/05/24  0432 09/04/24  0425 09/02/24  0401 09/01/24  0319   GLUCOSE mg/dL 125* 83 100*   < > 130*   SODIUM mmol/L 143 142 139   < > 144   POTASSIUM mmol/L 3.9 4.2 4.2   < > 4.0   CHLORIDE mmol/L 112* 109* 108*   < > 113*   CO2 mmol/L 19* 23 22   < > 23   BUN mg/dL 44* 40* 37*   < > 37*   CREATININE mg/dL 1.26* 1.25* 1.08*   < > 1.50*   EGFR mL/min/1.73m*2 52* 53* 63   < > 43*   CALCIUM mg/dL 9.5 10.0 9.2   < > 8.9   PHOSPHORUS mg/dL  --   --   --   --  3.8   MAGNESIUM mg/dL  --   --   --   --  1.66    < > = values in this interval not displayed.     Lab Results   Component Value Date    HGBA1C 5.1 12/15/2020     Results from last 7 days   Lab Units 09/06/24  0014 09/05/24  1918 09/05/24  1143 09/04/24  1658 09/04/24  1133 09/04/24  0957 09/03/24  2329 09/02/24  2340   POCT GLUCOSE mg/dL 101* 97 97 93 99 111* 97 107*     Lab Results " "  Component Value Date    ALBUMIN 3.6 09/04/2024      No results found for: \"CRP\"        Nutrition Specific Medications:   Scheduled medications:  amLODIPine, 5 mg, nasogastric tube, Daily  bisacodyl, 10 mg, rectal, Daily  carvedilol, 3.125 mg, oral, BID  famotidine, 20 mg, nasogastric tube, Daily  mupirocin, 1 Application, Topical, BID  nystatin, 1 Application, Topical, BID  oxygen, , inhalation, Continuous - Inhalation  perflutren protein A microsphere, 0.5 mL, intravenous, Once in imaging  sulfur hexafluoride microsphr, 2 mL, intravenous, Once in imaging      Continuous medications:     PRN medications:  PRN medications: acetaminophen **OR** acetaminophen **OR** acetaminophen, loperamide, magnesium hydroxide, ondansetron ODT **OR** ondansetron, zinc oxide     Nursing Data Per flowsheet:   Stool Appearance: Loose (09/04/24 1159)  Gastrointestinal  Gastrointestinal (WDL): Exceptions to WDL  Abdomen Inspection: Soft, Nondistended  Abdominal Tenderness: No guarding  Bowel Sounds: All quadrants  Bowel Sounds (All Quadrants): Active  Passing Flatus: Yes  Last BM Date: 09/04/24  Bowel Incontinence: Yes  Stool Appearance: Loose  Stool Color: Tan  Gastrointestinal Additional Assessments:  (Peg tube)  Feeding assistance level: Completely dependent    Intake/Output Summary (Last 24 hours) at 9/6/2024 1135  Last data filed at 9/6/2024 0959  Gross per 24 hour   Intake 874.22 ml   Output 1285 ml   Net -410.78 ml      Critical-Care Pain Observation Score:  [0-2]    Dietary Orders (From admission, onward)       Start     Ordered    09/05/24 1733  Enteral feeding with NPO PEG (percutaneous endoscopic gastric); 60 (start infusion at 20 titrate up to 60 as tolerated); 150; Water; Tap water; Every 6 hours  Diet effective now        Question Answer Comment   Tube feeding formula: Jevity 1.5    Feeding route: PEG (percutaneous endoscopic gastric)    Tube feeding continuous rate (mL/hr): 60 start infusion at 20 titrate up to 60 as " tolerated   Tube feeding flush (mL): 150    Flush type: Water    Water type: Tap water    Flush frequency: Every 6 hours        09/05/24 1733                     Estimated Needs:   Total Energy Estimated Needs (kCal): 1635 kCal  Method for Estimating Needs: 30 kcal/kg  Total Protein Estimated Needs (g): 65 g  Method for Estimating Needs: 1.2 g/kg  Total Fluid Estimated Needs (mL):  (other)  Method for Estimating Needs: per MD or 35 ml/kg, 1900 mL       Nutrition Diagnosis   Malnutrition Diagnosis  Patient has Malnutrition Diagnosis:  (insufficient data)    Nutrition Diagnosis  Patient has Nutrition Diagnosis: Yes  Diagnosis Status (1): Ongoing  Nutrition Diagnosis 1: Inadequate oral intake  Related to (1): mechanical vent  As Evidenced by (1): npo status, need for enteral nutrition       Nutrition Interventions/Recommendations   Nutrition Prescription:  Suggest Vital 1.5 @ 45 mL/hr. Start @ 25 mL/hr and increase as tolerated by 10 mL/hr every 4 hours; TF provides: 1080 mL volume, 1620 calories, 72 grams protein, 825 mL free water. Water flushes per MD or 150 mL every 4 hours    Nutrition Interventions:   Interventions: Enteral intake  Enteral Intake: Other (Comment)  Goal: iniate TF, tolerate TF within 48 hours    Collaboration and Referral of Nutrition Care: Collaboration by nutrition professional with other providers  Goal: Discussed wtih MD, RN        Recommendations:  Continue with TF order as pt tolerating well. No residuals.          Nutrition Monitoring and Evaluation   Food/Nutrient Related History Monitoring  Monitoring and Evaluation Plan: Energy intake  Energy Intake: Estimated energy intake  Criteria: meet >75% of estimated needs    Body Composition/Growth/Weight History  Monitoring and Evaluation Plan: Weight  Weight: Measured weight  Criteria: stable    Biochemical Data, Medical Tests and Procedures  Monitoring and Evaluation Plan: Electrolyte/renal panel  Electrolyte and Renal Panel:  Potassium  Criteria: wnl                 Time Spent (min): 45 minutes

## 2024-09-06 NOTE — CARE PLAN
Problem: Knowledge Deficit  Goal: Patient/family/caregiver demonstrates understanding of disease process, treatment plan, medications, and discharge instructions  Outcome: Not Progressing     Problem: Skin  Goal: Participates in plan/prevention/treatment measures  Flowsheets (Taken 9/6/2024 0609)  Participates in plan/prevention/treatment measures: Elevate heels  Goal: Prevent/manage excess moisture  Flowsheets (Taken 9/6/2024 0609)  Prevent/manage excess moisture: Cleanse incontinence/protect with barrier cream  Goal: Prevent/minimize sheer/friction injuries  Flowsheets (Taken 9/6/2024 0609)  Prevent/minimize sheer/friction injuries: Use pull sheet     Problem: Fall/Injury  Goal: Not fall by end of shift  Outcome: Met     Problem: Respiratory  Goal: Minimal/no exertional discomfort or dyspnea this shift  Outcome: Met   The patient's goals for the shift include      The clinical goals for the shift include tolerate peg tube placement, VSS  Goal Met    Problem: Knowledge Deficit  Goal: Patient/family/caregiver demonstrates understanding of disease process, treatment plan, medications, and discharge instructions  Outcome: Not Progressing

## 2024-09-06 NOTE — PROGRESS NOTES
Subjective Data:  Patient remains unresponsive at this point. Hemodynamically stable. S/P successful tracheostomy.     Overnight Events:    No     Objective Data:  Last Recorded Vitals:  Vitals:    09/06/24 0722 09/06/24 0800 09/06/24 0900 09/06/24 0953   BP:  116/81 85/59 113/77   BP Location:       Patient Position:       Pulse:  110 (!) 114 108   Resp: 18 20 22 20   Temp:       TempSrc:       SpO2: 94%      Weight:       Height:           Last Labs:  CBC - 9/6/2024:  4:54 AM  14.1 14.0 431    43.5      CMP - 9/6/2024:  4:54 AM  9.5 7.1 22 --- 0.6   3.8 3.6 37 94      PTT - No results in last year.  _   _ _     TROPHS   Date/Time Value Ref Range Status   08/27/2024 05:08 AM 87 0 - 13 ng/L Final   08/24/2024 04:39  0 - 13 ng/L Final   08/23/2024 03:57 AM 1,624 0 - 13 ng/L Final     HGBA1C   Date/Time Value Ref Range Status   12/15/2020 09:58 AM 5.1 4.0 - 5.6 % Final      Last I/O:  I/O last 3 completed shifts:  In: 747.5 (10.2 mL/kg) [I.V.:219.5 (3 mL/kg); NG/GT:478; IV Piggyback:50]  Out: 1935 (26.5 mL/kg) [Urine:1935 (0.7 mL/kg/hr)]  Weight: 73 kg     Past Cardiology Tests (Last 3 Years):  EKG:  ECG 12 lead 08/21/2024    Echo:  Transthoracic Echo (TTE) Limited 08/22/2024    Ejection Fractions:  EF   Date/Time Value Ref Range Status   08/22/2024 06:00 AM 40 %      Cath:  No results found for this or any previous visit from the past 1095 days.    Stress Test:  No results found for this or any previous visit from the past 1095 days.    Cardiac Imaging:  XR chest abdomen for OG NG placement 08/21/2024      Inpatient Medications:  Scheduled medications   Medication Dose Route Frequency    amLODIPine  5 mg nasogastric tube Daily    bisacodyl  10 mg rectal Daily    carvedilol  3.125 mg oral BID    famotidine  20 mg nasogastric tube Daily    mupirocin  1 Application Topical BID    nystatin  1 Application Topical BID    oxygen   inhalation Continuous - Inhalation    perflutren protein A microsphere  0.5 mL  intravenous Once in imaging    sulfur hexafluoride microsphr  2 mL intravenous Once in imaging     PRN medications   Medication    acetaminophen    Or    acetaminophen    Or    acetaminophen    loperamide    magnesium hydroxide    ondansetron ODT    Or    ondansetron    zinc oxide     Continuous Medications   Medication Dose Last Rate       Physical Exam:  General: Unconscious, intubated  Neck: supple; trachea midline; no masses; no JVD; S/P tracheostomy  Chest: clear breath sounds bilaterally; no wheezing; on Mechanical ventilation  Cardio: regular rhythm, S1S2 normal, no murmurs  Abdomen: Soft, non-tender, non-distension, no organomegaly  Extremities: no clubbing/cyanosis/edema     Assessment/Plan     Mrs. Fidelia Santiago is a 49 y.o. non-smoker female being consulted by the Cardiology team for troponin leak. Patient with prior medical history significant for bipolar disorder, dermatofibroma of the left arm, fibromyalgia, chronic pain, nonalcoholic fatty liver disease, and vitamin D deficiency. Patient intubated and cannot give reliable information at this point. History has been obtained from previous records. Per chart review,   she was brought by the EMS to the emergency room after being found unresponsive by her boyfriend. EMS noted that boyfriend was very aggressive on scene and placed into police custody. She was found unresponsive and emergently intubated by the EMS in the field. On presentation to the ER, blood pressure 118/101, respiratory rate 30, temperature 34.3, heart rate 92. Pertinent findings on blood workup; WBC 13,000, troponin 670 --> 885, potassium 7.4, creatinine 2.94, calcium 7.3, alkaline phosphatase 216, ALT 1750, AST 2674, creatinine kinase 1830 and lactic acid 2.9 ABG showed a pH of 7.10, pCO2 of 63 and pO2 of 80. U tox screen came back negative. Urinalysis came back grossly within normal limits. CT scan of the chest abdomen and pelvis showed bilateral lower lobe infiltrates compatible  with pneumonia presumably aspiration. There is also bilateral perinephric stranding. And constipation. EMS gave the patient on the way 4 doses of Narcan without improvement in consciousness. Patient was given in the emergency room IV fluids, ceftriaxone, azithromycin, DuoNebs, insulin, sodium bicarbonate. Patient was found to have a sepsis [leukocytosis, hypothermia, lactic acidosis] associated with acute kidney injury, hypercapnic respiratory failure with respiratory acidosis, hypocalcemia, transaminitis, rhabdomyolysis, and elevated troponin. All in the setting of an underlying possible aspiration pneumonia of gram-negative bacteria origin. She has been admitted for clinical compensation.     Assessment     # Troponin Elevation  - Trop 670 --> 885 --> 1,624 --> 713 --> 87.  - Troponin elevation is likely attributable to non-ischemic myocardial injury due to myocardial imbalance in oxygen supply/demand secondary to underlying rhabdomyolysis.   - Would suggest to continue current medical management per primary team.    - Hydration.  - Echo (08/22/2024):   1. The left ventricular systolic function is moderately decreased, with a visually estimated ejection fraction of 40%.   2. There is global hypokinesis of the left ventricle with minor regional variations.   3. Spectral Doppler shows a pseudonormal pattern of left ventricular diastolic filling.   4. There is reduced right ventricular systolic function  - Once patient is more stable, we can discuss options to rule out cardiac ischemia.     # Rhabdomyolysis / HERI  - Crea 3.4 --> 5.8 --> 3.15 --> 2.46 --> 1.08  - ALT 1376 --> 296  - AST 1494 --> 64  - Would suggest to follow Nephrology recs.     # Aspirative Pneumonia S/P Tracheostomy  - Medically managed.  - Would suggest to titrate hydration according to clinical status.       This critically ill patient continues to be at-risk for clinically significant deterioration / failure due to the above mentioned  dysfunctional, unstable organ systems.  I have personally identified and managed all complex critical care issues to prevent aforementioned clinical deterioration.  Critical care time is spent at bedside and/or the immediate area and has included, but is not limited to, the review of diagnostic tests, labs, radiographs, serial assessments of hemodynamics, respiratory status, ventilatory management, and family updates.  Time spent in procedures and teaching are reported separately.     Critical care time: 50 minutes    Peripheral IV 08/21/24 20 G Left Antecubital (Active)   Site Assessment Clean;Dry;Intact 09/06/24 0600   Dressing Type Transparent 09/06/24 0600   Line Status Saline locked 09/06/24 0600   Dressing Status Clean;Dry 09/06/24 0600   Number of days: 16       Peripheral IV 08/21/24 16 G Right Antecubital (Active)   Site Assessment Clean;Dry;Intact 09/06/24 0600   Dressing Type Transparent 09/06/24 0600   Line Status Infusing 09/06/24 0600   Dressing Status Clean;Dry 09/06/24 0600   Number of days: 16       Surgical Airway Shiley Cuffed 8 (Active)   Preferred Form of Communication Other (Comment) 09/06/24 0722   Status Secured 09/06/24 0722   Inflation Status Inflated 09/06/24 0722   Site Assessment Dry;Clean 09/06/24 0722   Ties Assessment Clean;Dry 09/06/24 0722   Backup Trach at Bedside Yes 09/06/24 0722   Number of days: 2       Urethral Catheter Straight-tip 16 Fr. (Active)   Output (mL) 300 mL 09/06/24 0600   Number of days: 16       Code Status:  Full Code    Jason Berman MD  Cardiology

## 2024-09-06 NOTE — PROGRESS NOTES
Music Therapy Note    Fidelia Santiago was referred by nursing staff.     Therapy Session  Referral Type: New referral this admission  Visit Type: Follow-up visit  Session Start Time: 1510  Session End Time: 1530  Intervention Delivery: In-person  Conflict of Service: None  Family Present for Session: None     Pre-assessment  Unable to Assess Reason: Cognitive limitation, Physical limitation  Mood/Affect: Calm         Treatment/Interventions  Areas of Focus: Coping, Emotional support, Relaxation  Music Therapy Interventions: Live music listening    Post-assessment  Unable to Assess Reason: Cognitive limitation, Physical limitation  Mood/Affect: Calm  Total Session Time (min): 20 minutes    Narrative  Assessment Detail: Pt found resting in bed, on trake, unresponsive to verbal stimuli.  Plan: Plan to provide live music as a means of emotional support, coping, and relaxation.  Intervention: During intervention MT provided live relaxing music via voice and guitar.  Evaluation: No notable change observed in pt's affect during session. Pt's HR remained between 100-107  Follow-up: MT will follow-up as applicable.    Education Documentation  No documentation found.        Expressive Therapies Note

## 2024-09-06 NOTE — PROGRESS NOTES
Fidelia Santiago is a 49 y.o. female on day 15 of admission presenting with No Principal Problem: There is no principal problem currently on the Problem List. Please update the Problem List and refresh..      Subjective   Patient seen and examined at the bedside this morning  Overnight she started having blood suctioned from her trachea.         Objective          Vitals 24HR  Heart Rate:  []   Temp:  [36.3 °C (97.3 °F)-37.3 °C (99.1 °F)]   Resp:  [15-23]   BP: ()/(59-88)   Weight:  [72.4 kg (159 lb 9.8 oz)-73 kg (160 lb 15 oz)]   SpO2:  [93 %-98 %]         Intake/Output last 3 Shifts:    Intake/Output Summary (Last 24 hours) at 9/6/2024 1016  Last data filed at 9/6/2024 0959  Gross per 24 hour   Intake 874.22 ml   Output 1285 ml   Net -410.78 ml       Physical Exam  Constitutional:       Comments: Sedated this a.m. and appears comfortable   HENT:      Head: Normocephalic and atraumatic.      Right Ear: External ear normal.      Left Ear: External ear normal.      Nose: Nose normal.      Mouth/Throat:      Mouth: Mucous membranes are moist.      Pharynx: Oropharynx is clear.   Eyes:      Comments: Eyes closed   Neck:      Comments: Tracheostomy present  Cardiovascular:      Rate and Rhythm: Regular rhythm. Tachycardia present.   Pulmonary:      Effort: Pulmonary effort is normal.      Breath sounds: Normal breath sounds.   Abdominal:      General: Abdomen is flat.      Palpations: Abdomen is soft.      Comments: PEG tube in place   Genitourinary:     Comments: Garcia catheter in place  Skin:     General: Skin is warm and dry.   Neurological:      Comments: Gag reflex present  Breathing over the ventilator  Sedated this a.m.         Relevant Results               Assessment/Plan   This patient currently has cardiac telemetry ordered; if you would like to modify or discontinue the telemetry order, click here to go to the orders activity to modify/discontinue the order.    This patient has a urinary  catheter   Reason for the urinary catheter remaining today? critically ill patient who need accurate urinary output measurements        Assessment & Plan    Anoxic brain injury  Comatose state  Drug overdose with unknown downtime  Systolic congestive heart failure biventricular  Leukocytosis: Improving  Diarrhea: Improved    Plan:  We will discontinue propofol  I am also going to discontinue her heparin due to her bleeding  We will monitor this for now  When the trach was placed and when the bronchoscopy was completed there was no signs of bleeding and things look quite good  We will continue to monitor this closely  Continue ventilatory support as she is on  We will try to send another sputum culture  I spent 30 minutes of critical care time directly involved in patient care excluding any billable procedures              Tomás aRndall, DO

## 2024-09-06 NOTE — DISCHARGE INSTR - DIET
Tube feeding formula: Jevity 1.5    Feeding route: PEG (percutaneous endoscopic gastric)    Tube feeding contin 60    water flush 150    flush type Water    water type Tap water    flush frequency Every 6 hours

## 2024-09-07 LAB
BACTERIA BLD CULT: NORMAL
BACTERIA BLD CULT: NORMAL

## 2024-09-07 NOTE — NURSING NOTE
Report given to transport team at bedside. RT at bedside to suction and navigate tubing. Vitals WNL. Belongings sent with patient.

## 2024-09-08 LAB
BACTERIA SPEC RESP CULT: ABNORMAL
BACTERIA SPEC RESP CULT: ABNORMAL
GRAM STN SPEC: ABNORMAL
GRAM STN SPEC: ABNORMAL

## 2024-09-26 ENCOUNTER — NURSING HOME VISIT (OUTPATIENT)
Dept: POST ACUTE CARE | Facility: EXTERNAL LOCATION | Age: 49
End: 2024-09-26
Payer: COMMERCIAL

## 2024-09-26 DIAGNOSIS — J96.01 ACUTE RESPIRATORY FAILURE WITH HYPOXEMIA (MULTI): ICD-10-CM

## 2024-09-26 DIAGNOSIS — F31.9 BIPOLAR AFFECTIVE DISORDER, REMISSION STATUS UNSPECIFIED (MULTI): ICD-10-CM

## 2024-09-26 DIAGNOSIS — F43.10 POSTTRAUMATIC STRESS DISORDER: ICD-10-CM

## 2024-09-26 DIAGNOSIS — Z93.0 TRACHEOSTOMY STATUS (MULTI): ICD-10-CM

## 2024-09-26 DIAGNOSIS — G93.1 ANOXIC ENCEPHALOPATHY: ICD-10-CM

## 2024-09-26 DIAGNOSIS — Z00.00 ROUTINE GENERAL MEDICAL EXAMINATION AT A HEALTH CARE FACILITY: ICD-10-CM

## 2024-09-26 PROCEDURE — 99306 1ST NF CARE HIGH MDM 50: CPT | Performed by: INTERNAL MEDICINE

## 2024-09-26 NOTE — LETTER
Patient: Fidelia Santiago  : 1975    Encounter Date: 2024    Name: Fidelia Santiago  : 1975  MRN: [id number]  Visit Date: 2024  Chief Complaint: HISTORY AND PHYSICAL    HPI: 48 y/o, Full Code, presented to the ER d/t unresponsiveness, found by boyfriend. He was aggressive at the scene and had to be taken away by police. Intubated by EMS. Narcan given x4 by EMS without improvement in consciousness. Empty methadone bottles were found in the home and boyfriend stated that they were his. All drug and ETOH panels were -ve. She was found to have sepsis most likely 2/2 aspiration pneumonia, HERI, transaminases, rhabdomyolysis and elevated troponin. EEG showed diffuse encephalopathy. Family opted for trach and PEG tube placement. Trach placed  and PEG placed . Discharged to facility with stage 1 sacral ulcer. Discharged to [facility]. On 2024 for ongoing vent management, medication management and therapy services.     Subjective: Seen and examined today. Reviewed hospitalization. Trach in place. Not on ventilator. Nursing team report no issues.     The patient was counseled regarding diagnostic results, instructions for management, risk factor reductions, prognosis, patient and family education, impressions, risks and benefits of treatment options and importance of compliance with treatment. I have reviewed nursing notes since my last visit and document any significant changes Reviewed orders, medications, Labs. Reviewed chart looking at current medications, treatments, labs, x-rays etc.     ROS:  As above in subjective. Otherwise, all other systems have been reviewed and are negative for complaint.    Current Outpatient Medications   Medication Instructions   • acetaminophen (TYLENOL) 650 mg, oral, Every 6 hours PRN   • amLODIPine (NORVASC) 5 mg, g-tube, Daily   • carvedilol (COREG) 3.125 mg, oral, 2 times daily   • enoxaparin (LOVENOX) 40 mg, subcutaneous, Daily RT   •  famotidine (PEPCID) 20 mg, nasogastric tube, Daily   • ipratropium-albuteroL (Duo-Neb) 0.5-2.5 mg/3 mL nebulizer solution 3 mL, inhalation, Every 4 hours PRN   • metoprolol tartrate (LOPRESSOR) 25 mg, oral, 2 times daily      Past Medical History:   Diagnosis Date   • Bipolar 1 disorder (Multi)    • Fibromyalgia    • PTSD (post-traumatic stress disorder)      Past Surgical History:   Procedure Laterality Date   • BREAST LUMPECTOMY Left     2013-atypical cell tumor   • CHOLECYSTECTOMY     • TONSILLECTOMY     • TUBAL LIGATION       Family History   Problem Relation Name Age of Onset   • No Known Problems Mother     • No Known Problems Father       Social History     Tobacco Use   • Smoking status: Never   • Smokeless tobacco: Never   Substance Use Topics   • Alcohol use: Yes       No Known Allergies     Vital Signs:   Vital Signs were reviewed in nursing home documentation.    Physical Exam  Vitals reviewed. Exam conducted with a chaperone present.   Constitutional:       General: She is sleeping.      Appearance: She is ill-appearing.   HENT:      Head: Normocephalic.      Right Ear: External ear normal.      Left Ear: External ear normal.      Nose: Nose normal.      Mouth/Throat:      Lips: Pink.      Mouth: Mucous membranes are moist.   Eyes:      General: Lids are normal.      Extraocular Movements:      Right eye: Abnormal extraocular motion present.      Comments: reactive L>R. EOMs full, no nystagmus, pursuits and saccades are uncooperative.   Neck:      Trachea: Tracheostomy present.   Cardiovascular:      Rate and Rhythm: Normal rate and regular rhythm.      Heart sounds: Normal heart sounds.   Pulmonary:      Effort: Pulmonary effort is normal.      Breath sounds: Decreased breath sounds and rhonchi present.   Abdominal:      General: A surgical scar is present. Bowel sounds are normal.      Palpations: Abdomen is soft.      Comments: PEG tube in place   Skin:     General: Skin is warm and moist.       Findings: Bruising and wound present.      Comments: Sacral wound dressing c/d/i   Neurological:      Mental Status: She is unresponsive.      Cranial Nerves: Cranial nerve deficit present.      Sensory: Sensory deficit present.      Motor: Weakness and atrophy present.      Comments: Abnormal response to noxious stimuli noted in bilateral upper extremities.     Psychiatric:         Attention and Perception: She is inattentive.         Cognition and Memory: Cognition is impaired. Memory is impaired.       Lab Results   Component Value Date    WBC 14.1 (H) 09/06/2024    HGB 14.0 09/06/2024    HCT 43.5 09/06/2024     09/06/2024    ALT 37 09/04/2024    AST 22 09/04/2024     09/06/2024    K 3.9 09/06/2024     (H) 09/06/2024    CREATININE 1.26 (H) 09/06/2024    BUN 44 (H) 09/06/2024    CO2 19 (L) 09/06/2024    HGBA1C 5.1 12/15/2020     Results were reviewed and addressed accordingly. Lab Results were also reviewed in [facility].     Provider Impression:   Anoxic Encephalopathy  #found unresponsive on 8/21/2024 of unclear etiology with aspiration pneumonia and respiratory failure  - toxicology was -ve for any illegal substances  - no improvement with narcan (used several times on pt)  - EEG showed severe diffuse encephalopathy  - has not made any significant improvement in mental status since 8/21.    Trach Dependent Respiratory Failure   #trach placed 9/4/2024  - wean as tolerated  - consult pulm and respiratory team  - wilner as scheduled  - monitor respiratory status closely    Elevated troponin felt to be 2/2 non ischemic MI d/t myocardial imbalance in 02 supply/demand 2/2 rhabdomyolysis  - improved with hydration  - Echo done 8/22/2024 showing LVSF 40%, global hypokinesis of LV, pseudo normal pattern of LVDF, reduced RVSF.  - Cardiology would like to see pt OP when she improves from other issues to rule out ischemia.    BiPolar Disorder, PTSD  - not on medication for this currently d/t acute  issues  - monitor    Sacral Ulcer  - consult wound team  - dressing changes  - turn every 2 hours    Dysphagia s/p PEG tube placement  - consulted dietician  - c/w tube feed and water flushes    Tachycardia, HTN  - c/w metoprolol tart and coreg  - c/w amlodipine    GI Ppx  - c/w pepcid    DVT Ppx  - lovenox subcutaneous    Code Status  - Full Code  ----------------  Written by Fidelia Crabtree RN, acting as a scribe for Dr. Kearns. This note accurately reflects the work and decisions made by Dr. Kearns.     I, Dr. Kearns, attest all medical record entries made by the scribe were under my direction and were personally dictated by me. I have reviewed the chart and agree that the record accurately reflects my performance of the history, physical exam, and assessment and plan.     Electronically Signed By: Marck Alves MD   10/8/24 12:45 PM

## 2024-10-03 ENCOUNTER — NURSING HOME VISIT (OUTPATIENT)
Dept: POST ACUTE CARE | Facility: EXTERNAL LOCATION | Age: 49
End: 2024-10-03
Payer: COMMERCIAL

## 2024-10-03 DIAGNOSIS — F31.9 BIPOLAR AFFECTIVE DISORDER, REMISSION STATUS UNSPECIFIED (MULTI): ICD-10-CM

## 2024-10-03 DIAGNOSIS — F43.10 POSTTRAUMATIC STRESS DISORDER: ICD-10-CM

## 2024-10-03 DIAGNOSIS — Z00.00 ROUTINE GENERAL MEDICAL EXAMINATION AT A HEALTH CARE FACILITY: ICD-10-CM

## 2024-10-03 DIAGNOSIS — G93.1 ANOXIC ENCEPHALOPATHY: ICD-10-CM

## 2024-10-03 DIAGNOSIS — Z78.9 NURSING HOME RESIDENT: ICD-10-CM

## 2024-10-03 DIAGNOSIS — J96.01 ACUTE RESPIRATORY FAILURE WITH HYPOXEMIA (MULTI): ICD-10-CM

## 2024-10-03 DIAGNOSIS — Z93.0 TRACHEOSTOMY STATUS (MULTI): ICD-10-CM

## 2024-10-03 PROCEDURE — 99309 SBSQ NF CARE MODERATE MDM 30: CPT | Performed by: INTERNAL MEDICINE

## 2024-10-03 NOTE — LETTER
Patient: Fidelia Santiago  : 1975    Encounter Date: 10/03/2024    Name: Fidelia Santiago  : 1975  MRN: [id number]  Visit Date: 10/3/2024  Chief Complaint: Weekly SNF Physician Visit    HPI: 48 y/o, Full Code, presented to the ER d/t unresponsiveness, found by boyfriend. He was aggressive at the scene and had to be taken away by police. Intubated by EMS. Narcan given x4 by EMS without improvement in consciousness. Empty methadone bottles were found in the home and boyfriend stated that they were his. All drug and ETOH panels were -ve. She was found to have sepsis most likely 2/2 aspiration pneumonia, HERI, transaminases, rhabdomyolysis and elevated troponin. EEG showed diffuse encephalopathy. Family opted for trach and PEG tube placement. Trach placed  and PEG placed . Discharged to facility with stage 1 sacral ulcer. Discharged to [facility]. On 2024 for ongoing vent management, medication management and therapy services.     Subjective: Seen and examined today. Opens eyes briefly when speaking to her. Trach in place. Not on ventilator. Nursing team report no issues.     The patient was counseled regarding diagnostic results, instructions for management, risk factor reductions, prognosis, patient and family education, impressions, risks and benefits of treatment options and importance of compliance with treatment. I have reviewed nursing notes since my last visit and document any significant changes Reviewed orders, medications, Labs. Reviewed chart looking at current medications, treatments, labs, x-rays etc.     ROS:  As above in subjective. Otherwise, all other systems have been reviewed and are negative for complaint.    Current Outpatient Medications   Medication Instructions   • acetaminophen (TYLENOL) 650 mg, oral, Every 6 hours PRN   • amLODIPine (NORVASC) 5 mg, g-tube, Daily   • carvedilol (COREG) 3.125 mg, oral, 2 times daily   • enoxaparin (LOVENOX) 40 mg, subcutaneous,  Daily RT   • famotidine (PEPCID) 20 mg, nasogastric tube, Daily   • ipratropium-albuteroL (Duo-Neb) 0.5-2.5 mg/3 mL nebulizer solution 3 mL, inhalation, Every 4 hours PRN   • metoprolol tartrate (LOPRESSOR) 25 mg, oral, 2 times daily      Physical Exam  Vitals reviewed. Exam conducted with a chaperone present.   Constitutional:       General: She is sleeping.      Appearance: She is ill-appearing.   HENT:      Head: Normocephalic.      Right Ear: External ear normal.      Left Ear: External ear normal.      Nose: Nose normal.      Mouth/Throat:      Lips: Pink.      Mouth: Mucous membranes are moist.   Eyes:      General: Lids are normal.      Extraocular Movements:      Right eye: Abnormal extraocular motion present.      Comments: reactive L>R. EOMs full, no nystagmus, pursuits and saccades are uncooperative.   Neck:      Trachea: Tracheostomy present.   Cardiovascular:      Rate and Rhythm: Normal rate and regular rhythm.      Heart sounds: Normal heart sounds.   Pulmonary:      Effort: Pulmonary effort is normal.      Breath sounds: Decreased breath sounds and rhonchi present.   Abdominal:      General: A surgical scar is present. Bowel sounds are normal.      Palpations: Abdomen is soft.      Comments: PEG tube in place   Skin:     General: Skin is warm and moist.      Findings: Bruising and wound present.      Comments: Sacral wound dressing c/d/i   Neurological:      Mental Status: She is unresponsive.      Cranial Nerves: Cranial nerve deficit present.      Sensory: Sensory deficit present.      Motor: Weakness and atrophy present.      Comments: Abnormal response to noxious stimuli noted in bilateral upper extremities.     Psychiatric:         Attention and Perception: She is inattentive.         Cognition and Memory: Cognition is impaired. Memory is impaired.       Lab Results   Component Value Date    WBC 14.1 (H) 09/06/2024    HGB 14.0 09/06/2024    HCT 43.5 09/06/2024     09/06/2024    ALT 37  09/04/2024    AST 22 09/04/2024     09/06/2024    K 3.9 09/06/2024     (H) 09/06/2024    CREATININE 1.26 (H) 09/06/2024    BUN 44 (H) 09/06/2024    CO2 19 (L) 09/06/2024    HGBA1C 5.1 12/15/2020     Results were reviewed and addressed accordingly. Lab Results were also reviewed in [facility].     Provider Impression:   Anoxic Encephalopathy  #found unresponsive on 8/21/2024 of unclear etiology with aspiration pneumonia and respiratory failure  - toxicology was -ve for any illegal substances  - no improvement with narcan (used several times on pt)  - EEG showed severe diffuse encephalopathy  - has not made any significant improvement in mental status since 8/21.    Trach Dependent Respiratory Failure   #trach placed 9/4/2024  - wean as tolerated  - consult pulm and respiratory team  - geraldinebs as scheduled  - monitor respiratory status closely    Elevated troponin felt to be 2/2 non ischemic MI d/t myocardial imbalance in 02 supply/demand 2/2 rhabdomyolysis  - improved with hydration  - Echo done 8/22/2024 showing LVSF 40%, global hypokinesis of LV, pseudo normal pattern of LVDF, reduced RVSF.  - Cardiology would like to see pt OP when she improves from other issues to rule out ischemia.    BiPolar Disorder, PTSD  - not on medication for this currently d/t acute issues  - monitor    Sacral Ulcer  - consult wound team  - dressing changes  - turn every 2 hours    Dysphagia s/p PEG tube placement  - consulted dietician  - c/w tube feed and water flushes    Tachycardia, HTN  - c/w metoprolol tart and coreg  - c/w amlodipine    GI Ppx  - c/w pepcid    DVT Ppx  - lovenox subcutaneous    Code Status  - Full Code  ----------------  Written by Fidelia Crabtree RN, acting as a scribe for Dr. Kearns. This note accurately reflects the work and decisions made by Dr. Kearns.     I, Dr. Kearns, attest all medical record entries made by the scribe were under my direction and were personally dictated by me. I have reviewed the chart  and agree that the record accurately reflects my performance of the history, physical exam, and assessment and plan.       Electronically Signed By: Marck Alves MD   10/8/24  1:32 PM

## 2024-10-07 PROBLEM — J96.01 ACUTE RESPIRATORY FAILURE WITH HYPOXEMIA (MULTI): Status: ACTIVE | Noted: 2024-09-06

## 2024-10-07 PROBLEM — G93.1 ANOXIC ENCEPHALOPATHY: Status: ACTIVE | Noted: 2024-09-09

## 2024-10-07 PROBLEM — Z93.0 TRACHEOSTOMY STATUS (MULTI): Status: ACTIVE | Noted: 2024-09-09

## 2024-10-07 PROBLEM — Z00.00 ROUTINE GENERAL MEDICAL EXAMINATION AT A HEALTH CARE FACILITY: Status: ACTIVE | Noted: 2023-10-19

## 2024-10-07 PROBLEM — F43.10 POSTTRAUMATIC STRESS DISORDER: Status: ACTIVE | Noted: 2023-05-13

## 2024-10-07 RX ORDER — ACETAMINOPHEN 325 MG/1
650 TABLET ORAL EVERY 6 HOURS PRN
COMMUNITY
Start: 2024-09-21

## 2024-10-07 RX ORDER — IPRATROPIUM BROMIDE AND ALBUTEROL SULFATE 2.5; .5 MG/3ML; MG/3ML
3 SOLUTION RESPIRATORY (INHALATION) EVERY 4 HOURS PRN
COMMUNITY
Start: 2024-09-21

## 2024-10-07 RX ORDER — ENOXAPARIN SODIUM 100 MG/ML
40 INJECTION SUBCUTANEOUS
COMMUNITY
Start: 2024-09-22

## 2024-10-07 RX ORDER — METOPROLOL TARTRATE 25 MG/1
25 TABLET, FILM COATED ORAL 2 TIMES DAILY
COMMUNITY
Start: 2024-09-21

## 2024-10-07 NOTE — PROGRESS NOTES
Name: Fidelia Santiago  : 1975  MRN: [id number]  Visit Date: 2024  Chief Complaint: HISTORY AND PHYSICAL    HPI: 50 y/o, Full Code, presented to the ER d/t unresponsiveness, found by boyfriend. He was aggressive at the scene and had to be taken away by police. Intubated by EMS. Narcan given x4 by EMS without improvement in consciousness. Empty methadone bottles were found in the home and boyfriend stated that they were his. All drug and ETOH panels were -ve. She was found to have sepsis most likely 2/2 aspiration pneumonia, HERI, transaminases, rhabdomyolysis and elevated troponin. EEG showed diffuse encephalopathy. Family opted for trach and PEG tube placement. Trach placed  and PEG placed . Discharged to facility with stage 1 sacral ulcer. Discharged to [facility]. On 2024 for ongoing vent management, medication management and therapy services.     Subjective: Seen and examined today. Reviewed hospitalization. Trach in place. Not on ventilator. Nursing team report no issues.     The patient was counseled regarding diagnostic results, instructions for management, risk factor reductions, prognosis, patient and family education, impressions, risks and benefits of treatment options and importance of compliance with treatment. I have reviewed nursing notes since my last visit and document any significant changes Reviewed orders, medications, Labs. Reviewed chart looking at current medications, treatments, labs, x-rays etc.     ROS:  As above in subjective. Otherwise, all other systems have been reviewed and are negative for complaint.    Current Outpatient Medications   Medication Instructions    acetaminophen (TYLENOL) 650 mg, oral, Every 6 hours PRN    amLODIPine (NORVASC) 5 mg, g-tube, Daily    carvedilol (COREG) 3.125 mg, oral, 2 times daily    enoxaparin (LOVENOX) 40 mg, subcutaneous, Daily RT    famotidine (PEPCID) 20 mg, nasogastric tube, Daily    ipratropium-albuteroL (Duo-Neb)  0.5-2.5 mg/3 mL nebulizer solution 3 mL, inhalation, Every 4 hours PRN    metoprolol tartrate (LOPRESSOR) 25 mg, oral, 2 times daily      Past Medical History:   Diagnosis Date    Bipolar 1 disorder (Multi)     Fibromyalgia     PTSD (post-traumatic stress disorder)      Past Surgical History:   Procedure Laterality Date    BREAST LUMPECTOMY Left     2013-atypical cell tumor    CHOLECYSTECTOMY      TONSILLECTOMY      TUBAL LIGATION       Family History   Problem Relation Name Age of Onset    No Known Problems Mother      No Known Problems Father       Social History     Tobacco Use    Smoking status: Never    Smokeless tobacco: Never   Substance Use Topics    Alcohol use: Yes       No Known Allergies     Vital Signs:   Vital Signs were reviewed in nursing home documentation.    Physical Exam  Vitals reviewed. Exam conducted with a chaperone present.   Constitutional:       General: She is sleeping.      Appearance: She is ill-appearing.   HENT:      Head: Normocephalic.      Right Ear: External ear normal.      Left Ear: External ear normal.      Nose: Nose normal.      Mouth/Throat:      Lips: Pink.      Mouth: Mucous membranes are moist.   Eyes:      General: Lids are normal.      Extraocular Movements:      Right eye: Abnormal extraocular motion present.      Comments: reactive L>R. EOMs full, no nystagmus, pursuits and saccades are uncooperative.   Neck:      Trachea: Tracheostomy present.   Cardiovascular:      Rate and Rhythm: Normal rate and regular rhythm.      Heart sounds: Normal heart sounds.   Pulmonary:      Effort: Pulmonary effort is normal.      Breath sounds: Decreased breath sounds and rhonchi present.   Abdominal:      General: A surgical scar is present. Bowel sounds are normal.      Palpations: Abdomen is soft.      Comments: PEG tube in place   Skin:     General: Skin is warm and moist.      Findings: Bruising and wound present.      Comments: Sacral wound dressing c/d/i   Neurological:       Mental Status: She is unresponsive.      Cranial Nerves: Cranial nerve deficit present.      Sensory: Sensory deficit present.      Motor: Weakness and atrophy present.      Comments: Abnormal response to noxious stimuli noted in bilateral upper extremities.     Psychiatric:         Attention and Perception: She is inattentive.         Cognition and Memory: Cognition is impaired. Memory is impaired.       Lab Results   Component Value Date    WBC 14.1 (H) 09/06/2024    HGB 14.0 09/06/2024    HCT 43.5 09/06/2024     09/06/2024    ALT 37 09/04/2024    AST 22 09/04/2024     09/06/2024    K 3.9 09/06/2024     (H) 09/06/2024    CREATININE 1.26 (H) 09/06/2024    BUN 44 (H) 09/06/2024    CO2 19 (L) 09/06/2024    HGBA1C 5.1 12/15/2020     Results were reviewed and addressed accordingly. Lab Results were also reviewed in [facility].     Provider Impression:   Anoxic Encephalopathy  #found unresponsive on 8/21/2024 of unclear etiology with aspiration pneumonia and respiratory failure  - toxicology was -ve for any illegal substances  - no improvement with narcan (used several times on pt)  - EEG showed severe diffuse encephalopathy  - has not made any significant improvement in mental status since 8/21.    Trach Dependent Respiratory Failure   #trach placed 9/4/2024  - wean as tolerated  - consult pulm and respiratory team  - wilner as scheduled  - monitor respiratory status closely    Elevated troponin felt to be 2/2 non ischemic MI d/t myocardial imbalance in 02 supply/demand 2/2 rhabdomyolysis  - improved with hydration  - Echo done 8/22/2024 showing LVSF 40%, global hypokinesis of LV, pseudo normal pattern of LVDF, reduced RVSF.  - Cardiology would like to see pt OP when she improves from other issues to rule out ischemia.    BiPolar Disorder, PTSD  - not on medication for this currently d/t acute issues  - monitor    Sacral Ulcer  - consult wound team  - dressing changes  - turn every 2 hours    Dysphagia  s/p PEG tube placement  - consulted dietician  - c/w tube feed and water flushes    Tachycardia, HTN  - c/w metoprolol tart and coreg  - c/w amlodipine    GI Ppx  - c/w pepcid    DVT Ppx  - lovenox subcutaneous    Code Status  - Full Code  ----------------  Written by Fidelia Crabtree RN, acting as a scribe for Dr. Kearns. This note accurately reflects the work and decisions made by Dr. Kearns.     I, Dr. Kearns, attest all medical record entries made by the scribe were under my direction and were personally dictated by me. I have reviewed the chart and agree that the record accurately reflects my performance of the history, physical exam, and assessment and plan.

## 2024-10-07 NOTE — PROGRESS NOTES
Name: Fidelia Santiago  : 1975  MRN: [id number]  Visit Date: 10/3/2024  Chief Complaint: Weekly SNF Physician Visit    HPI: 50 y/o, Full Code, presented to the ER d/t unresponsiveness, found by boyfriend. He was aggressive at the scene and had to be taken away by police. Intubated by EMS. Narcan given x4 by EMS without improvement in consciousness. Empty methadone bottles were found in the home and boyfriend stated that they were his. All drug and ETOH panels were -ve. She was found to have sepsis most likely 2/2 aspiration pneumonia, HERI, transaminases, rhabdomyolysis and elevated troponin. EEG showed diffuse encephalopathy. Family opted for trach and PEG tube placement. Trach placed  and PEG placed . Discharged to facility with stage 1 sacral ulcer. Discharged to [facility]. On 2024 for ongoing vent management, medication management and therapy services.     Subjective: Seen and examined today. Opens eyes briefly when speaking to her. Trach in place. Not on ventilator. Nursing team report no issues.     The patient was counseled regarding diagnostic results, instructions for management, risk factor reductions, prognosis, patient and family education, impressions, risks and benefits of treatment options and importance of compliance with treatment. I have reviewed nursing notes since my last visit and document any significant changes Reviewed orders, medications, Labs. Reviewed chart looking at current medications, treatments, labs, x-rays etc.     ROS:  As above in subjective. Otherwise, all other systems have been reviewed and are negative for complaint.    Current Outpatient Medications   Medication Instructions    acetaminophen (TYLENOL) 650 mg, oral, Every 6 hours PRN    amLODIPine (NORVASC) 5 mg, g-tube, Daily    carvedilol (COREG) 3.125 mg, oral, 2 times daily    enoxaparin (LOVENOX) 40 mg, subcutaneous, Daily RT    famotidine (PEPCID) 20 mg, nasogastric tube, Daily     ipratropium-albuteroL (Duo-Neb) 0.5-2.5 mg/3 mL nebulizer solution 3 mL, inhalation, Every 4 hours PRN    metoprolol tartrate (LOPRESSOR) 25 mg, oral, 2 times daily      Physical Exam  Vitals reviewed. Exam conducted with a chaperone present.   Constitutional:       General: She is sleeping.      Appearance: She is ill-appearing.   HENT:      Head: Normocephalic.      Right Ear: External ear normal.      Left Ear: External ear normal.      Nose: Nose normal.      Mouth/Throat:      Lips: Pink.      Mouth: Mucous membranes are moist.   Eyes:      General: Lids are normal.      Extraocular Movements:      Right eye: Abnormal extraocular motion present.      Comments: reactive L>R. EOMs full, no nystagmus, pursuits and saccades are uncooperative.   Neck:      Trachea: Tracheostomy present.   Cardiovascular:      Rate and Rhythm: Normal rate and regular rhythm.      Heart sounds: Normal heart sounds.   Pulmonary:      Effort: Pulmonary effort is normal.      Breath sounds: Decreased breath sounds and rhonchi present.   Abdominal:      General: A surgical scar is present. Bowel sounds are normal.      Palpations: Abdomen is soft.      Comments: PEG tube in place   Skin:     General: Skin is warm and moist.      Findings: Bruising and wound present.      Comments: Sacral wound dressing c/d/i   Neurological:      Mental Status: She is unresponsive.      Cranial Nerves: Cranial nerve deficit present.      Sensory: Sensory deficit present.      Motor: Weakness and atrophy present.      Comments: Abnormal response to noxious stimuli noted in bilateral upper extremities.     Psychiatric:         Attention and Perception: She is inattentive.         Cognition and Memory: Cognition is impaired. Memory is impaired.       Lab Results   Component Value Date    WBC 14.1 (H) 09/06/2024    HGB 14.0 09/06/2024    HCT 43.5 09/06/2024     09/06/2024    ALT 37 09/04/2024    AST 22 09/04/2024     09/06/2024    K 3.9 09/06/2024      (H) 09/06/2024    CREATININE 1.26 (H) 09/06/2024    BUN 44 (H) 09/06/2024    CO2 19 (L) 09/06/2024    HGBA1C 5.1 12/15/2020     Results were reviewed and addressed accordingly. Lab Results were also reviewed in [facility].     Provider Impression:   Anoxic Encephalopathy  #found unresponsive on 8/21/2024 of unclear etiology with aspiration pneumonia and respiratory failure  - toxicology was -ve for any illegal substances  - no improvement with narcan (used several times on pt)  - EEG showed severe diffuse encephalopathy  - has not made any significant improvement in mental status since 8/21.    Trach Dependent Respiratory Failure   #trach placed 9/4/2024  - wean as tolerated  - consult pulm and respiratory team  - wilner as scheduled  - monitor respiratory status closely    Elevated troponin felt to be 2/2 non ischemic MI d/t myocardial imbalance in 02 supply/demand 2/2 rhabdomyolysis  - improved with hydration  - Echo done 8/22/2024 showing LVSF 40%, global hypokinesis of LV, pseudo normal pattern of LVDF, reduced RVSF.  - Cardiology would like to see pt OP when she improves from other issues to rule out ischemia.    BiPolar Disorder, PTSD  - not on medication for this currently d/t acute issues  - monitor    Sacral Ulcer  - consult wound team  - dressing changes  - turn every 2 hours    Dysphagia s/p PEG tube placement  - consulted dietician  - c/w tube feed and water flushes    Tachycardia, HTN  - c/w metoprolol tart and coreg  - c/w amlodipine    GI Ppx  - c/w pepcid    DVT Ppx  - lovenox subcutaneous    Code Status  - Full Code  ----------------  Written by Fidelia Crabtree RN, acting as a scribe for Dr. Kearns. This note accurately reflects the work and decisions made by Dr. Kearns.     I, Dr. Kearns, attest all medical record entries made by the scribe were under my direction and were personally dictated by me. I have reviewed the chart and agree that the record accurately reflects my performance of the  history, physical exam, and assessment and plan.

## 2024-10-10 ENCOUNTER — NURSING HOME VISIT (OUTPATIENT)
Dept: POST ACUTE CARE | Facility: EXTERNAL LOCATION | Age: 49
End: 2024-10-10
Payer: COMMERCIAL

## 2024-10-10 DIAGNOSIS — Z93.0 TRACHEOSTOMY STATUS (MULTI): ICD-10-CM

## 2024-10-10 DIAGNOSIS — G93.1 ANOXIC ENCEPHALOPATHY: ICD-10-CM

## 2024-10-10 DIAGNOSIS — F31.9 BIPOLAR AFFECTIVE DISORDER, REMISSION STATUS UNSPECIFIED (MULTI): ICD-10-CM

## 2024-10-10 DIAGNOSIS — J96.01 ACUTE RESPIRATORY FAILURE WITH HYPOXEMIA (MULTI): ICD-10-CM

## 2024-10-10 DIAGNOSIS — Z00.00 ROUTINE GENERAL MEDICAL EXAMINATION AT A HEALTH CARE FACILITY: ICD-10-CM

## 2024-10-10 DIAGNOSIS — F43.10 POSTTRAUMATIC STRESS DISORDER: ICD-10-CM

## 2024-10-10 PROCEDURE — 99309 SBSQ NF CARE MODERATE MDM 30: CPT | Performed by: INTERNAL MEDICINE

## 2024-10-10 NOTE — LETTER
Patient: Fidelia Santiago  : 1975    Encounter Date: 10/10/2024    Name: Fidelia Santiago  : 1975  MRN: [id number]  Visit Date: 10/10/2024  Chief Complaint: Weekly SNF Physician Visit    HPI: 48 y/o, Full Code, presented to the ER d/t unresponsiveness, found by boyfriend. He was aggressive at the scene and had to be taken away by police. Intubated by EMS. Narcan given x4 by EMS without improvement in consciousness. Empty methadone bottles were found in the home and boyfriend stated that they were his. All drug and ETOH panels were -ve. She was found to have sepsis most likely 2/2 aspiration pneumonia, HERI, transaminases, rhabdomyolysis and elevated troponin. EEG showed diffuse encephalopathy. Family opted for trach and PEG tube placement. Trach placed  and PEG placed . Discharged to facility with stage 1 sacral ulcer. Discharged to [facility]. On 2024 for ongoing vent management, medication management and therapy services.     Subjective: Seen and examined today. Opens eyes briefly when speaking to her. Trach in place. Not on ventilator. Nursing team report no issues.     The patient was counseled regarding diagnostic results, instructions for management, risk factor reductions, prognosis, patient and family education, impressions, risks and benefits of treatment options and importance of compliance with treatment. I have reviewed nursing notes since my last visit and document any significant changes Reviewed orders, medications, Labs. Reviewed chart looking at current medications, treatments, labs, x-rays etc.     ROS:  As above in subjective. Otherwise, all other systems have been reviewed and are negative for complaint.    Current Outpatient Medications   Medication Instructions   • acetaminophen (TYLENOL) 650 mg, oral, Every 6 hours PRN   • amLODIPine (NORVASC) 5 mg, g-tube, Daily   • carvedilol (COREG) 3.125 mg, oral, 2 times daily   • enoxaparin (LOVENOX) 40 mg,  subcutaneous, Daily RT   • famotidine (PEPCID) 20 mg, nasogastric tube, Daily   • ipratropium-albuteroL (Duo-Neb) 0.5-2.5 mg/3 mL nebulizer solution 3 mL, inhalation, Every 4 hours PRN   • metoprolol tartrate (LOPRESSOR) 25 mg, oral, 2 times daily      Physical Exam  Vitals reviewed. Exam conducted with a chaperone present.   Constitutional:       General: She is sleeping.      Appearance: She is ill-appearing.   HENT:      Head: Normocephalic.      Right Ear: External ear normal.      Left Ear: External ear normal.      Nose: Nose normal.      Mouth/Throat:      Lips: Pink.      Mouth: Mucous membranes are moist.   Eyes:      General: Lids are normal.      Extraocular Movements:      Right eye: Abnormal extraocular motion present.      Comments: reactive L>R. EOMs full, no nystagmus, pursuits and saccades are uncooperative.   Neck:      Trachea: Tracheostomy present.   Cardiovascular:      Rate and Rhythm: Normal rate and regular rhythm.      Heart sounds: Normal heart sounds.   Pulmonary:      Effort: Pulmonary effort is normal.      Breath sounds: Decreased breath sounds and rhonchi present.   Abdominal:      General: A surgical scar is present. Bowel sounds are normal.      Palpations: Abdomen is soft.      Comments: PEG tube in place   Skin:     General: Skin is warm and moist.      Findings: Bruising and wound present.      Comments: Sacral wound dressing c/d/i   Neurological:      Mental Status: She is unresponsive.      Cranial Nerves: Cranial nerve deficit present.      Sensory: Sensory deficit present.      Motor: Weakness and atrophy present.      Comments: Abnormal response to noxious stimuli noted in bilateral upper extremities.     Psychiatric:         Attention and Perception: She is inattentive.         Cognition and Memory: Cognition is impaired. Memory is impaired.       Lab Results   Component Value Date    WBC 14.1 (H) 09/06/2024    HGB 14.0 09/06/2024    HCT 43.5 09/06/2024     09/06/2024     ALT 37 09/04/2024    AST 22 09/04/2024     09/06/2024    K 3.9 09/06/2024     (H) 09/06/2024    CREATININE 1.26 (H) 09/06/2024    BUN 44 (H) 09/06/2024    CO2 19 (L) 09/06/2024    HGBA1C 5.1 12/15/2020     Results were reviewed and addressed accordingly. Lab Results were also reviewed in [facility].     Provider Impression:   Anoxic Encephalopathy  #found unresponsive on 8/21/2024 of unclear etiology with aspiration pneumonia and respiratory failure  - toxicology was -ve for any illegal substances  - no improvement with narcan (used several times on pt)  - EEG showed severe diffuse encephalopathy  - has not made any significant improvement in mental status since 8/21.    Trach Dependent Respiratory Failure   #trach placed 9/4/2024  - wean as tolerated  - consult pulm and respiratory team  - geraldinebs as scheduled  - monitor respiratory status closely    Elevated troponin felt to be 2/2 non ischemic MI d/t myocardial imbalance in 02 supply/demand 2/2 rhabdomyolysis  - improved with hydration  - Echo done 8/22/2024 showing LVSF 40%, global hypokinesis of LV, pseudo normal pattern of LVDF, reduced RVSF.  - Cardiology would like to see pt OP when she improves from other issues to rule out ischemia.    BiPolar Disorder, PTSD  - not on medication for this currently d/t acute issues  - monitor    Sacral Ulcer  - consult wound team  - dressing changes  - turn every 2 hours    Dysphagia s/p PEG tube placement  - consulted dietician  - c/w tube feed and water flushes    Tachycardia, HTN  - c/w metoprolol tart and coreg  - c/w amlodipine    GI Ppx  - c/w pepcid    DVT Ppx  - lovenox subcutaneous    Code Status  - Full Code  ----------------  Written by Fidelia Crabtree RN, acting as a scribe for Dr. Kearns. This note accurately reflects the work and decisions made by Dr. Kearns.     I, Dr. Kearns, attest all medical record entries made by the scribe were under my direction and were personally dictated by me. I have reviewed  the chart and agree that the record accurately reflects my performance of the history, physical exam, and assessment and plan.       Electronically Signed By: Marck Alves MD   10/16/24  4:13 PM

## 2024-10-16 NOTE — PROGRESS NOTES
Name: Fidelia Santiago  : 1975  MRN: [id number]  Visit Date: 10/10/2024  Chief Complaint: Weekly SNF Physician Visit    HPI: 50 y/o, Full Code, presented to the ER d/t unresponsiveness, found by boyfriend. He was aggressive at the scene and had to be taken away by police. Intubated by EMS. Narcan given x4 by EMS without improvement in consciousness. Empty methadone bottles were found in the home and boyfriend stated that they were his. All drug and ETOH panels were -ve. She was found to have sepsis most likely 2/2 aspiration pneumonia, HERI, transaminases, rhabdomyolysis and elevated troponin. EEG showed diffuse encephalopathy. Family opted for trach and PEG tube placement. Trach placed  and PEG placed . Discharged to facility with stage 1 sacral ulcer. Discharged to [facility]. On 2024 for ongoing vent management, medication management and therapy services.     Subjective: Seen and examined today. Opens eyes briefly when speaking to her. Trach in place. Not on ventilator. Nursing team report no issues.     The patient was counseled regarding diagnostic results, instructions for management, risk factor reductions, prognosis, patient and family education, impressions, risks and benefits of treatment options and importance of compliance with treatment. I have reviewed nursing notes since my last visit and document any significant changes Reviewed orders, medications, Labs. Reviewed chart looking at current medications, treatments, labs, x-rays etc.     ROS:  As above in subjective. Otherwise, all other systems have been reviewed and are negative for complaint.    Current Outpatient Medications   Medication Instructions    acetaminophen (TYLENOL) 650 mg, oral, Every 6 hours PRN    amLODIPine (NORVASC) 5 mg, g-tube, Daily    carvedilol (COREG) 3.125 mg, oral, 2 times daily    enoxaparin (LOVENOX) 40 mg, subcutaneous, Daily RT    famotidine (PEPCID) 20 mg, nasogastric tube, Daily     ipratropium-albuteroL (Duo-Neb) 0.5-2.5 mg/3 mL nebulizer solution 3 mL, inhalation, Every 4 hours PRN    metoprolol tartrate (LOPRESSOR) 25 mg, oral, 2 times daily      Physical Exam  Vitals reviewed. Exam conducted with a chaperone present.   Constitutional:       General: She is sleeping.      Appearance: She is ill-appearing.   HENT:      Head: Normocephalic.      Right Ear: External ear normal.      Left Ear: External ear normal.      Nose: Nose normal.      Mouth/Throat:      Lips: Pink.      Mouth: Mucous membranes are moist.   Eyes:      General: Lids are normal.      Extraocular Movements:      Right eye: Abnormal extraocular motion present.      Comments: reactive L>R. EOMs full, no nystagmus, pursuits and saccades are uncooperative.   Neck:      Trachea: Tracheostomy present.   Cardiovascular:      Rate and Rhythm: Normal rate and regular rhythm.      Heart sounds: Normal heart sounds.   Pulmonary:      Effort: Pulmonary effort is normal.      Breath sounds: Decreased breath sounds and rhonchi present.   Abdominal:      General: A surgical scar is present. Bowel sounds are normal.      Palpations: Abdomen is soft.      Comments: PEG tube in place   Skin:     General: Skin is warm and moist.      Findings: Bruising and wound present.      Comments: Sacral wound dressing c/d/i   Neurological:      Mental Status: She is unresponsive.      Cranial Nerves: Cranial nerve deficit present.      Sensory: Sensory deficit present.      Motor: Weakness and atrophy present.      Comments: Abnormal response to noxious stimuli noted in bilateral upper extremities.     Psychiatric:         Attention and Perception: She is inattentive.         Cognition and Memory: Cognition is impaired. Memory is impaired.       Lab Results   Component Value Date    WBC 14.1 (H) 09/06/2024    HGB 14.0 09/06/2024    HCT 43.5 09/06/2024     09/06/2024    ALT 37 09/04/2024    AST 22 09/04/2024     09/06/2024    K 3.9 09/06/2024      (H) 09/06/2024    CREATININE 1.26 (H) 09/06/2024    BUN 44 (H) 09/06/2024    CO2 19 (L) 09/06/2024    HGBA1C 5.1 12/15/2020     Results were reviewed and addressed accordingly. Lab Results were also reviewed in [facility].     Provider Impression:   Anoxic Encephalopathy  #found unresponsive on 8/21/2024 of unclear etiology with aspiration pneumonia and respiratory failure  - toxicology was -ve for any illegal substances  - no improvement with narcan (used several times on pt)  - EEG showed severe diffuse encephalopathy  - has not made any significant improvement in mental status since 8/21.    Trach Dependent Respiratory Failure   #trach placed 9/4/2024  - wean as tolerated  - consult pulm and respiratory team  - wilner as scheduled  - monitor respiratory status closely    Elevated troponin felt to be 2/2 non ischemic MI d/t myocardial imbalance in 02 supply/demand 2/2 rhabdomyolysis  - improved with hydration  - Echo done 8/22/2024 showing LVSF 40%, global hypokinesis of LV, pseudo normal pattern of LVDF, reduced RVSF.  - Cardiology would like to see pt OP when she improves from other issues to rule out ischemia.    BiPolar Disorder, PTSD  - not on medication for this currently d/t acute issues  - monitor    Sacral Ulcer  - consult wound team  - dressing changes  - turn every 2 hours    Dysphagia s/p PEG tube placement  - consulted dietician  - c/w tube feed and water flushes    Tachycardia, HTN  - c/w metoprolol tart and coreg  - c/w amlodipine    GI Ppx  - c/w pepcid    DVT Ppx  - lovenox subcutaneous    Code Status  - Full Code  ----------------  Written by Fidelia Crabtree RN, acting as a scribe for Dr. Kearns. This note accurately reflects the work and decisions made by Dr. Kearns.     I, Dr. Kearns, attest all medical record entries made by the scribe were under my direction and were personally dictated by me. I have reviewed the chart and agree that the record accurately reflects my performance of the  history, physical exam, and assessment and plan.

## 2024-11-23 ENCOUNTER — APPOINTMENT (OUTPATIENT)
Dept: RADIOLOGY | Facility: HOSPITAL | Age: 49
End: 2024-11-23
Payer: COMMERCIAL

## 2024-11-23 ENCOUNTER — HOSPITAL ENCOUNTER (EMERGENCY)
Facility: HOSPITAL | Age: 49
Discharge: NURSING, REHAB OR LONG-TERM CARE | End: 2024-11-23
Attending: EMERGENCY MEDICINE
Payer: COMMERCIAL

## 2024-11-23 VITALS
HEIGHT: 64 IN | WEIGHT: 155 LBS | OXYGEN SATURATION: 96 % | BODY MASS INDEX: 26.46 KG/M2 | TEMPERATURE: 96.8 F | RESPIRATION RATE: 22 BRPM | SYSTOLIC BLOOD PRESSURE: 115 MMHG | HEART RATE: 110 BPM | DIASTOLIC BLOOD PRESSURE: 87 MMHG

## 2024-11-23 DIAGNOSIS — N93.9 VAGINAL BLEEDING: Primary | ICD-10-CM

## 2024-11-23 LAB
ALBUMIN SERPL BCP-MCNC: 3.5 G/DL (ref 3.4–5)
ALP SERPL-CCNC: 147 U/L (ref 33–110)
ALT SERPL W P-5'-P-CCNC: 26 U/L (ref 7–45)
ANION GAP BLDV CALCULATED.4IONS-SCNC: 7 MMOL/L (ref 10–25)
ANION GAP SERPL CALC-SCNC: 13 MMOL/L (ref 10–20)
AST SERPL W P-5'-P-CCNC: 18 U/L (ref 9–39)
BASE EXCESS BLDV CALC-SCNC: 5 MMOL/L (ref -2–3)
BASOPHILS # BLD AUTO: 0.04 X10*3/UL (ref 0–0.1)
BASOPHILS NFR BLD AUTO: 0.5 %
BILIRUB SERPL-MCNC: 0.6 MG/DL (ref 0–1.2)
BODY TEMPERATURE: ABNORMAL
BUN SERPL-MCNC: 17 MG/DL (ref 6–23)
CA-I BLDV-SCNC: 1.26 MMOL/L (ref 1.1–1.33)
CALCIUM SERPL-MCNC: 9.2 MG/DL (ref 8.6–10.3)
CHLORIDE BLDV-SCNC: 101 MMOL/L (ref 98–107)
CHLORIDE SERPL-SCNC: 100 MMOL/L (ref 98–107)
CO2 SERPL-SCNC: 27 MMOL/L (ref 21–32)
CREAT SERPL-MCNC: 0.37 MG/DL (ref 0.5–1.05)
EGFRCR SERPLBLD CKD-EPI 2021: >90 ML/MIN/1.73M*2
EOSINOPHIL # BLD AUTO: 0.02 X10*3/UL (ref 0–0.7)
EOSINOPHIL NFR BLD AUTO: 0.2 %
ERYTHROCYTE [DISTWIDTH] IN BLOOD BY AUTOMATED COUNT: 14.7 % (ref 11.5–14.5)
GLUCOSE BLD MANUAL STRIP-MCNC: 108 MG/DL (ref 74–99)
GLUCOSE BLDV-MCNC: 114 MG/DL (ref 74–99)
GLUCOSE SERPL-MCNC: 107 MG/DL (ref 74–99)
HCO3 BLDV-SCNC: 29.1 MMOL/L (ref 22–26)
HCT VFR BLD AUTO: 41.1 % (ref 36–46)
HCT VFR BLD EST: 44 % (ref 36–46)
HGB BLD-MCNC: 13.8 G/DL (ref 12–16)
HGB BLDV-MCNC: 14.6 G/DL (ref 12–16)
IMM GRANULOCYTES # BLD AUTO: 0.08 X10*3/UL (ref 0–0.7)
IMM GRANULOCYTES NFR BLD AUTO: 0.9 % (ref 0–0.9)
INHALED O2 CONCENTRATION: 21 %
INR PPP: 1.1 (ref 0.9–1.1)
LACTATE BLDV-SCNC: 1.8 MMOL/L (ref 0.4–2)
LACTATE SERPL-SCNC: 1.7 MMOL/L (ref 0.4–2)
LYMPHOCYTES # BLD AUTO: 1.7 X10*3/UL (ref 1.2–4.8)
LYMPHOCYTES NFR BLD AUTO: 19.2 %
MCH RBC QN AUTO: 30.5 PG (ref 26–34)
MCHC RBC AUTO-ENTMCNC: 33.6 G/DL (ref 32–36)
MCV RBC AUTO: 91 FL (ref 80–100)
MONOCYTES # BLD AUTO: 0.77 X10*3/UL (ref 0.1–1)
MONOCYTES NFR BLD AUTO: 8.7 %
NEUTROPHILS # BLD AUTO: 6.25 X10*3/UL (ref 1.2–7.7)
NEUTROPHILS NFR BLD AUTO: 70.5 %
NRBC BLD-RTO: 0 /100 WBCS (ref 0–0)
OXYHGB MFR BLDV: 86.7 % (ref 45–75)
PCO2 BLDV: 40 MM HG (ref 41–51)
PH BLDV: 7.47 PH (ref 7.33–7.43)
PLATELET # BLD AUTO: 312 X10*3/UL (ref 150–450)
PO2 BLDV: 58 MM HG (ref 35–45)
POTASSIUM BLDV-SCNC: 5 MMOL/L (ref 3.5–5.3)
POTASSIUM SERPL-SCNC: 4.3 MMOL/L (ref 3.5–5.3)
PROT SERPL-MCNC: 6.7 G/DL (ref 6.4–8.2)
PROTHROMBIN TIME: 12.6 SECONDS (ref 9.8–12.8)
RBC # BLD AUTO: 4.52 X10*6/UL (ref 4–5.2)
SAO2 % BLDV: 89 % (ref 45–75)
SODIUM BLDV-SCNC: 132 MMOL/L (ref 136–145)
SODIUM SERPL-SCNC: 136 MMOL/L (ref 136–145)
WBC # BLD AUTO: 8.9 X10*3/UL (ref 4.4–11.3)

## 2024-11-23 PROCEDURE — 74174 CTA ABD&PLVS W/CONTRAST: CPT

## 2024-11-23 PROCEDURE — 2500000005 HC RX 250 GENERAL PHARMACY W/O HCPCS: Performed by: PHYSICIAN ASSISTANT

## 2024-11-23 PROCEDURE — 85610 PROTHROMBIN TIME: CPT | Performed by: PHYSICIAN ASSISTANT

## 2024-11-23 PROCEDURE — 2500000004 HC RX 250 GENERAL PHARMACY W/ HCPCS (ALT 636 FOR OP/ED)

## 2024-11-23 PROCEDURE — 83605 ASSAY OF LACTIC ACID: CPT | Performed by: PHYSICIAN ASSISTANT

## 2024-11-23 PROCEDURE — 36415 COLL VENOUS BLD VENIPUNCTURE: CPT | Performed by: PHYSICIAN ASSISTANT

## 2024-11-23 PROCEDURE — 96374 THER/PROPH/DIAG INJ IV PUSH: CPT | Mod: 59

## 2024-11-23 PROCEDURE — 84132 ASSAY OF SERUM POTASSIUM: CPT | Mod: 59 | Performed by: PHYSICIAN ASSISTANT

## 2024-11-23 PROCEDURE — 2550000001 HC RX 255 CONTRASTS: Performed by: EMERGENCY MEDICINE

## 2024-11-23 PROCEDURE — 94760 N-INVAS EAR/PLS OXIMETRY 1: CPT | Mod: CCI

## 2024-11-23 PROCEDURE — 82947 ASSAY GLUCOSE BLOOD QUANT: CPT | Mod: 59

## 2024-11-23 PROCEDURE — 85025 COMPLETE CBC W/AUTO DIFF WBC: CPT | Performed by: PHYSICIAN ASSISTANT

## 2024-11-23 PROCEDURE — 2500000001 HC RX 250 WO HCPCS SELF ADMINISTERED DRUGS (ALT 637 FOR MEDICARE OP): Performed by: EMERGENCY MEDICINE

## 2024-11-23 PROCEDURE — 84132 ASSAY OF SERUM POTASSIUM: CPT | Performed by: PHYSICIAN ASSISTANT

## 2024-11-23 PROCEDURE — 99284 EMERGENCY DEPT VISIT MOD MDM: CPT | Mod: 25

## 2024-11-23 PROCEDURE — 31720 CLEARANCE OF AIRWAYS: CPT

## 2024-11-23 PROCEDURE — 74174 CTA ABD&PLVS W/CONTRAST: CPT | Mod: FOREIGN READ | Performed by: RADIOLOGY

## 2024-11-23 RX ORDER — PANTOPRAZOLE SODIUM 40 MG/10ML
80 INJECTION, POWDER, LYOPHILIZED, FOR SOLUTION INTRAVENOUS DAILY
Status: DISCONTINUED | OUTPATIENT
Start: 2024-11-23 | End: 2024-11-23 | Stop reason: HOSPADM

## 2024-11-23 RX ORDER — METOPROLOL TARTRATE 25 MG/1
25 TABLET, FILM COATED ORAL ONCE
Status: COMPLETED | OUTPATIENT
Start: 2024-11-23 | End: 2024-11-23

## 2024-11-23 RX ORDER — CARVEDILOL 3.12 MG/1
3.12 TABLET ORAL ONCE
Status: COMPLETED | OUTPATIENT
Start: 2024-11-23 | End: 2024-11-23

## 2024-11-23 RX ORDER — PANTOPRAZOLE SODIUM 40 MG/10ML
INJECTION, POWDER, LYOPHILIZED, FOR SOLUTION INTRAVENOUS
Status: COMPLETED
Start: 2024-11-23 | End: 2024-11-23

## 2024-11-23 RX ADMIN — Medication 28 PERCENT: at 12:34

## 2024-11-23 RX ADMIN — CARVEDILOL 3.12 MG: 3.12 TABLET, FILM COATED ORAL at 20:34

## 2024-11-23 RX ADMIN — IOHEXOL 75 ML: 350 INJECTION, SOLUTION INTRAVENOUS at 13:32

## 2024-11-23 RX ADMIN — PANTOPRAZOLE SODIUM 80 MG: 40 INJECTION, POWDER, LYOPHILIZED, FOR SOLUTION INTRAVENOUS at 12:34

## 2024-11-23 RX ADMIN — PANTOPRAZOLE SODIUM 80 MG: 40 INJECTION, POWDER, FOR SOLUTION INTRAVENOUS at 12:34

## 2024-11-23 RX ADMIN — METOPROLOL TARTRATE 25 MG: 25 TABLET, FILM COATED ORAL at 20:35

## 2024-11-23 ASSESSMENT — PAIN - FUNCTIONAL ASSESSMENT
PAIN_FUNCTIONAL_ASSESSMENT: WONG-BAKER FACES
PAIN_FUNCTIONAL_ASSESSMENT: UNABLE TO SELF-REPORT

## 2024-11-23 ASSESSMENT — PAIN SCALES - WONG BAKER: WONGBAKER_NUMERICALRESPONSE: NO HURT

## 2024-11-23 ASSESSMENT — COLUMBIA-SUICIDE SEVERITY RATING SCALE - C-SSRS
6. HAVE YOU EVER DONE ANYTHING, STARTED TO DO ANYTHING, OR PREPARED TO DO ANYTHING TO END YOUR LIFE?: NO
1. IN THE PAST MONTH, HAVE YOU WISHED YOU WERE DEAD OR WISHED YOU COULD GO TO SLEEP AND NOT WAKE UP?: NO
2. HAVE YOU ACTUALLY HAD ANY THOUGHTS OF KILLING YOURSELF?: NO

## 2024-11-23 NOTE — ED PROVIDER NOTES
HPI   No chief complaint on file.      History of present illness:  49-year-old female presents to the emergency room for complaints of possible GI bleed.  The patient is presented by EMS who states that they were called to the facility as the patient has had some hard stools over the past couple days and they have noticed some bleeding and he believes is bright red in color.  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  They state that she was admitted back in early September to hospital and eventually to a SNF after the patient unfortunately suffered a drug overdose resulting in a hypoxic brain injury.  The patient has been on trach since then of a trach collar at this time and she is nonverbal.  She is on Lovenox long-term for DVT prophylaxis.     Social history: Unable to obtain at this time secondary patient's mental state    Review of systems: Unable to obtain at this time secondary to patient's mental state        Physical exam:  General: Vitals noted, no distress. Afebrile.   EENT: No lymphadenopathy appreciated  Cardiac: Regular, rate, rhythm, no murmur.   Pulmonary: Lungs clear bilaterally with good aeration. No adventitious breath sounds.   Abdomen: Soft, nonsurgical. Nontender. No peritoneal signs. Normoactive bowel sounds.   Extremities: No peripheral edema.  Contractures are present in the left hand  Skin: No rash.   Neuro: Patient's eyes are open but she makes no attempt to interact with the practitioner, she is on a trach with vent collar at this time, there appear to be some contractures present in the left side of the body      Medical decision making:   Testing: CBC CMP lactate, CT scan abdomen pelvis with contrast: CBC CMP unremarkable lactate INR unremarkable venous blood gas shows oxygen at 58 CO2 at 40 pH is 7.47 CT scan pelvis with angio study did not show any acute findings on presentation, all imaging interpreted by radiology  Plan: 49-year-old  female presents to the emergency room for complaints of possible GI bleed.  The patient is presented by EMS who states that they were called to the facility as the patient has had some hard stools over the past couple days and they have noticed some bleeding and he believes is bright red in color.  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  They state that she was admitted back in early September to hospital and eventually to a SNF after the patient unfortunately suffered a drug overdose resulting in a hypoxic brain injury.  The patient has been on trach since then of a trach collar at this time and she is nonverbal.  She is on Lovenox long-term for DVT prophylaxis.  Abdomen: Soft, nonsurgical. Nontender. No peritoneal signs. Normoactive bowel sounds.   Extremities: No peripheral edema.  Contractures are present in the left hand  Skin: No rash.   Neuro: Patient's eyes are open but she makes no attempt to interact with the practitioner, she is on a trach with vent collar at this time, there appear to be some contractures present in the left side of the body.  On rectal exam there appears that the patient had a large bowel movement and the stool is brown and there is no obvious bleeding present or black tarry stool present at this time, she appears to be menstruating as well at this time, no other acute findings on exam.  I spoke with the nursing staff at the facility explained to them I believe the patient may be menstruating at this time.  They explained to me that she has not had a menstrual cycle since being there at the facility but I explained to them that she could be going through menopause at this time the scan did not show any acute findings at this time.  I explained to them that would be sending the patient back at this time and they are agreeable to plan.  They will send the patient back in event that she has any further symptoms.  Impression:   1. Vaginal  bleeding             History provided by:  Patient   used: No            Patient History   Past Medical History:   Diagnosis Date    Bipolar 1 disorder (Multi)     Fibromyalgia     PTSD (post-traumatic stress disorder)      Past Surgical History:   Procedure Laterality Date    BREAST LUMPECTOMY Left     2013-atypical cell tumor    CHOLECYSTECTOMY      TONSILLECTOMY      TUBAL LIGATION       Family History   Problem Relation Name Age of Onset    No Known Problems Mother      No Known Problems Father       Social History     Tobacco Use    Smoking status: Never    Smokeless tobacco: Never   Vaping Use    Vaping status: Every Day   Substance Use Topics    Alcohol use: Yes    Drug use: Never       Physical Exam   ED Triage Vitals   Temp Pulse Resp BP   -- -- -- --      SpO2 Temp src Heart Rate Source Patient Position   -- -- -- --      BP Location FiO2 (%)     -- --       Physical Exam      ED Course & MDM   Diagnoses as of 11/23/24 1523   Vaginal bleeding                 No data recorded                                 Medical Decision Making      Procedure  Procedures     Raymundo Day PA-C  11/23/24 1524

## 2024-11-23 NOTE — ED PROVIDER NOTES
The patient was seen by the midlevel/resident.  I have personally saw the patient and made/approved the management plan and take responsibility for the patient management.  I reviewed the EKG's (when done) and agree with the interpretation.  I have seen and examined the patient; agree with the workup, evaluation, MDM, and diagnosis.  The care plan has been discussed with the midlevel/resident; I have reviewed the note and agree with the documented findings.     Patient is nonverbal and history comes from the nurses.  She has anoxic brain injury and has a feeding tube and is on Lovenox for DVT prophylaxis.  She has had blood in her stools per report.  Nondistended abdomen patient was worked up with CT scan and labs.  She is not following commands eyes are open and looking to the left.  Per nurse that she is at baseline regarding her mental status. Rectal done by midlevel was negative put patient had some blood from her vagina. Not currently anemic. Will discharge back to facility.   Diagnoses as of 11/24/24 0518   Vaginal bleeding     MD Alexey Jama MD  11/24/24 0580

## 2024-12-15 ENCOUNTER — NURSING HOME VISIT (OUTPATIENT)
Dept: POST ACUTE CARE | Facility: EXTERNAL LOCATION | Age: 49
End: 2024-12-15
Payer: MEDICAID

## 2024-12-15 DIAGNOSIS — Z78.9 NURSING HOME RESIDENT: ICD-10-CM

## 2024-12-15 DIAGNOSIS — G93.1 ANOXIC ENCEPHALOPATHY: ICD-10-CM

## 2024-12-15 DIAGNOSIS — Z00.00 ROUTINE GENERAL MEDICAL EXAMINATION AT A HEALTH CARE FACILITY: ICD-10-CM

## 2024-12-15 DIAGNOSIS — F43.10 POSTTRAUMATIC STRESS DISORDER: ICD-10-CM

## 2024-12-15 DIAGNOSIS — J96.01 ACUTE RESPIRATORY FAILURE WITH HYPOXEMIA (MULTI): ICD-10-CM

## 2024-12-15 DIAGNOSIS — Z93.0 TRACHEOSTOMY STATUS (MULTI): ICD-10-CM

## 2024-12-15 DIAGNOSIS — F31.9 BIPOLAR AFFECTIVE DISORDER, REMISSION STATUS UNSPECIFIED (MULTI): ICD-10-CM

## 2024-12-15 PROCEDURE — 99308 SBSQ NF CARE LOW MDM 20: CPT | Performed by: INTERNAL MEDICINE

## 2024-12-15 NOTE — LETTER
Patient: Fidelia Santiago  : 1975    Encounter Date: 12/15/2024    Name: Fidelia Santiago  : 1975  MRN: [id number]  Visit Date: 12/15/2024  Chief Complaint: Monthly LT Physician Visit    HPI: 50 y/o, Full Code, presented to the ER d/t unresponsiveness, found by boyfriend. He was aggressive at the scene and had to be taken away by police. Intubated by EMS. Narcan given x4 by EMS without improvement in consciousness. Empty methadone bottles were found in the home and boyfriend stated that they were his. All drug and ETOH panels were -ve. She was found to have sepsis most likely 2/2 aspiration pneumonia, HERI, transaminases, rhabdomyolysis and elevated troponin. EEG showed diffuse encephalopathy. Family opted for trach and PEG tube placement. Trach placed  and PEG placed . Discharged to facility with stage 1 sacral ulcer. Discharged to [facility]. On 2024 for ongoing vent management, medication management and therapy services.     Subjective: Seen and examined today. Opens eyes briefly when speaking to her. Trach in place. Not on ventilator. Nursing team report no issues.     The patient was counseled regarding diagnostic results, instructions for management, risk factor reductions, prognosis, patient and family education, impressions, risks and benefits of treatment options and importance of compliance with treatment. I have reviewed nursing notes since my last visit and document any significant changes Reviewed orders, medications, Labs. Reviewed chart looking at current medications, treatments, labs, x-rays etc.     ROS:  As above in subjective. Otherwise, all other systems have been reviewed and are negative for complaint.    Current Outpatient Medications   Medication Instructions   • acetaminophen (TYLENOL) 650 mg, oral, Every 6 hours PRN   • amLODIPine (NORVASC) 5 mg, g-tube, Daily   • carvedilol (COREG) 3.125 mg, oral, 2 times daily   • enoxaparin (LOVENOX) 40 mg,  subcutaneous, Daily RT   • famotidine (PEPCID) 20 mg, nasogastric tube, Daily   • ipratropium-albuteroL (Duo-Neb) 0.5-2.5 mg/3 mL nebulizer solution 3 mL, inhalation, Every 4 hours PRN   • metoprolol tartrate (LOPRESSOR) 25 mg, oral, 2 times daily      Physical Exam  Vitals reviewed. Exam conducted with a chaperone present.   Constitutional:       General: She is sleeping.      Appearance: She is ill-appearing.   HENT:      Head: Normocephalic.      Right Ear: External ear normal.      Left Ear: External ear normal.      Nose: Nose normal.      Mouth/Throat:      Lips: Pink.      Mouth: Mucous membranes are moist.   Eyes:      General: Lids are normal.      Extraocular Movements:      Right eye: Abnormal extraocular motion present.      Comments: reactive L>R. EOMs full, no nystagmus, pursuits and saccades are uncooperative.   Neck:      Trachea: Tracheostomy present.   Cardiovascular:      Rate and Rhythm: Normal rate and regular rhythm.      Heart sounds: Normal heart sounds.   Pulmonary:      Effort: Pulmonary effort is normal.      Breath sounds: Decreased breath sounds and rhonchi present.   Abdominal:      General: A surgical scar is present. Bowel sounds are normal.      Palpations: Abdomen is soft.      Comments: PEG tube in place   Skin:     General: Skin is warm and moist.      Findings: Bruising and wound present.      Comments: Sacral wound dressing c/d/i   Neurological:      Mental Status: She is unresponsive.      Cranial Nerves: Cranial nerve deficit present.      Sensory: Sensory deficit present.      Motor: Weakness and atrophy present.      Comments: Abnormal response to noxious stimuli noted in bilateral upper extremities.     Psychiatric:         Attention and Perception: She is inattentive.         Cognition and Memory: Cognition is impaired. Memory is impaired.       Lab Results   Component Value Date    WBC 8.9 11/23/2024    HGB 13.8 11/23/2024    HCT 41.1 11/23/2024     11/23/2024     ALT 26 11/23/2024    AST 18 11/23/2024     11/23/2024    K 4.3 11/23/2024     11/23/2024    CREATININE 0.37 (L) 11/23/2024    BUN 17 11/23/2024    CO2 27 11/23/2024    INR 1.1 11/23/2024    HGBA1C 5.1 12/15/2020     Results were reviewed and addressed accordingly. Lab Results were also reviewed in [facility].     Provider Impression:   Anoxic Encephalopathy  #found unresponsive on 8/21/2024 of unclear etiology with aspiration pneumonia and respiratory failure  - toxicology was -ve for any illegal substances  - no improvement with narcan (used several times on pt)  - EEG showed severe diffuse encephalopathy  - has not made any significant improvement in mental status since 8/21.    Trach Dependent Respiratory Failure   #trach placed 9/4/2024  - wean as tolerated  - consult pulm and respiratory team  - tuanonebs as scheduled  - monitor respiratory status closely    Elevated troponin felt to be 2/2 non ischemic MI d/t myocardial imbalance in 02 supply/demand 2/2 rhabdomyolysis  - improved with hydration  - Echo done 8/22/2024 showing LVSF 40%, global hypokinesis of LV, pseudo normal pattern of LVDF, reduced RVSF.  - Cardiology would like to see pt OP when she improves from other issues to rule out ischemia.    BiPolar Disorder, PTSD  - not on medication for this currently d/t acute issues  - monitor    Sacral Ulcer  - consult wound team  - dressing changes  - turn every 2 hours    Dysphagia s/p PEG tube placement  - consulted dietician  - c/w tube feed and water flushes    Tachycardia, HTN  - c/w metoprolol tart and coreg  - c/w amlodipine    GI Ppx  - c/w pepcid    DVT Ppx  - lovenox subcutaneous    Code Status  - Full Code  ----------------  Written by Fidelia Crabtree RN, acting as a scribe for Dr. Kearns. This note accurately reflects the work and decisions made by Dr. Kearns.     I, Dr. Kearns, attest all medical record entries made by the scribe were under my direction and were personally dictated by me. I have  reviewed the chart and agree that the record accurately reflects my performance of the history, physical exam, and assessment and plan.     Electronically Signed By: Marck Alves MD   12/30/24  3:09 PM

## 2024-12-29 NOTE — PROGRESS NOTES
Name: Fidelia Santiago  : 1975  MRN: [id number]  Visit Date: 12/15/2024  Chief Complaint: Monthly LT Physician Visit    HPI: 50 y/o, Full Code, presented to the ER d/t unresponsiveness, found by boyfriend. He was aggressive at the scene and had to be taken away by police. Intubated by EMS. Narcan given x4 by EMS without improvement in consciousness. Empty methadone bottles were found in the home and boyfriend stated that they were his. All drug and ETOH panels were -ve. She was found to have sepsis most likely 2/2 aspiration pneumonia, HERI, transaminases, rhabdomyolysis and elevated troponin. EEG showed diffuse encephalopathy. Family opted for trach and PEG tube placement. Trach placed  and PEG placed . Discharged to facility with stage 1 sacral ulcer. Discharged to [facility]. On 2024 for ongoing vent management, medication management and therapy services.     Subjective: Seen and examined today. Opens eyes briefly when speaking to her. Trach in place. Not on ventilator. Nursing team report no issues.     The patient was counseled regarding diagnostic results, instructions for management, risk factor reductions, prognosis, patient and family education, impressions, risks and benefits of treatment options and importance of compliance with treatment. I have reviewed nursing notes since my last visit and document any significant changes Reviewed orders, medications, Labs. Reviewed chart looking at current medications, treatments, labs, x-rays etc.     ROS:  As above in subjective. Otherwise, all other systems have been reviewed and are negative for complaint.    Current Outpatient Medications   Medication Instructions    acetaminophen (TYLENOL) 650 mg, oral, Every 6 hours PRN    amLODIPine (NORVASC) 5 mg, g-tube, Daily    carvedilol (COREG) 3.125 mg, oral, 2 times daily    enoxaparin (LOVENOX) 40 mg, subcutaneous, Daily RT    famotidine (PEPCID) 20 mg, nasogastric tube, Daily     ipratropium-albuteroL (Duo-Neb) 0.5-2.5 mg/3 mL nebulizer solution 3 mL, inhalation, Every 4 hours PRN    metoprolol tartrate (LOPRESSOR) 25 mg, oral, 2 times daily      Physical Exam  Vitals reviewed. Exam conducted with a chaperone present.   Constitutional:       General: She is sleeping.      Appearance: She is ill-appearing.   HENT:      Head: Normocephalic.      Right Ear: External ear normal.      Left Ear: External ear normal.      Nose: Nose normal.      Mouth/Throat:      Lips: Pink.      Mouth: Mucous membranes are moist.   Eyes:      General: Lids are normal.      Extraocular Movements:      Right eye: Abnormal extraocular motion present.      Comments: reactive L>R. EOMs full, no nystagmus, pursuits and saccades are uncooperative.   Neck:      Trachea: Tracheostomy present.   Cardiovascular:      Rate and Rhythm: Normal rate and regular rhythm.      Heart sounds: Normal heart sounds.   Pulmonary:      Effort: Pulmonary effort is normal.      Breath sounds: Decreased breath sounds and rhonchi present.   Abdominal:      General: A surgical scar is present. Bowel sounds are normal.      Palpations: Abdomen is soft.      Comments: PEG tube in place   Skin:     General: Skin is warm and moist.      Findings: Bruising and wound present.      Comments: Sacral wound dressing c/d/i   Neurological:      Mental Status: She is unresponsive.      Cranial Nerves: Cranial nerve deficit present.      Sensory: Sensory deficit present.      Motor: Weakness and atrophy present.      Comments: Abnormal response to noxious stimuli noted in bilateral upper extremities.     Psychiatric:         Attention and Perception: She is inattentive.         Cognition and Memory: Cognition is impaired. Memory is impaired.       Lab Results   Component Value Date    WBC 8.9 11/23/2024    HGB 13.8 11/23/2024    HCT 41.1 11/23/2024     11/23/2024    ALT 26 11/23/2024    AST 18 11/23/2024     11/23/2024    K 4.3 11/23/2024      11/23/2024    CREATININE 0.37 (L) 11/23/2024    BUN 17 11/23/2024    CO2 27 11/23/2024    INR 1.1 11/23/2024    HGBA1C 5.1 12/15/2020     Results were reviewed and addressed accordingly. Lab Results were also reviewed in [facility].     Provider Impression:   Anoxic Encephalopathy  #found unresponsive on 8/21/2024 of unclear etiology with aspiration pneumonia and respiratory failure  - toxicology was -ve for any illegal substances  - no improvement with narcan (used several times on pt)  - EEG showed severe diffuse encephalopathy  - has not made any significant improvement in mental status since 8/21.    Trach Dependent Respiratory Failure   #trach placed 9/4/2024  - wean as tolerated  - consult pulm and respiratory team  - wilner as scheduled  - monitor respiratory status closely    Elevated troponin felt to be 2/2 non ischemic MI d/t myocardial imbalance in 02 supply/demand 2/2 rhabdomyolysis  - improved with hydration  - Echo done 8/22/2024 showing LVSF 40%, global hypokinesis of LV, pseudo normal pattern of LVDF, reduced RVSF.  - Cardiology would like to see pt OP when she improves from other issues to rule out ischemia.    BiPolar Disorder, PTSD  - not on medication for this currently d/t acute issues  - monitor    Sacral Ulcer  - consult wound team  - dressing changes  - turn every 2 hours    Dysphagia s/p PEG tube placement  - consulted dietician  - c/w tube feed and water flushes    Tachycardia, HTN  - c/w metoprolol tart and coreg  - c/w amlodipine    GI Ppx  - c/w pepcid    DVT Ppx  - lovenox subcutaneous    Code Status  - Full Code  ----------------  Written by Fidelia Crabtree RN, acting as a scribe for Dr. Kearns. This note accurately reflects the work and decisions made by Dr. Kearns.     I, Dr. Kearns, attest all medical record entries made by the scribe were under my direction and were personally dictated by me. I have reviewed the chart and agree that the record accurately reflects my performance of  the history, physical exam, and assessment and plan.

## 2025-02-04 ENCOUNTER — HOSPITAL ENCOUNTER (OUTPATIENT)
Dept: RADIOLOGY | Facility: HOSPITAL | Age: 50
Discharge: HOME | End: 2025-02-04
Payer: MEDICAID

## 2025-02-04 DIAGNOSIS — R13.12 OROPHARYNGEAL DYSPHAGIA: Primary | ICD-10-CM

## 2025-02-04 DIAGNOSIS — R13.10 DYSPHAGIA, UNSPECIFIED: ICD-10-CM

## 2025-02-04 PROCEDURE — 92611 MOTION FLUOROSCOPY/SWALLOW: CPT | Mod: GN

## 2025-02-04 PROCEDURE — 74230 X-RAY XM SWLNG FUNCJ C+: CPT | Performed by: RADIOLOGY

## 2025-02-04 PROCEDURE — 74230 X-RAY XM SWLNG FUNCJ C+: CPT

## 2025-02-04 PROCEDURE — 2500000005 HC RX 250 GENERAL PHARMACY W/O HCPCS: Performed by: INTERNAL MEDICINE

## 2025-02-04 RX ADMIN — BARIUM SULFATE 15 ML: 400 PASTE ORAL at 14:00

## 2025-02-04 RX ADMIN — BARIUM SULFATE 70 ML: 0.81 POWDER, FOR SUSPENSION ORAL at 13:58

## 2025-02-04 RX ADMIN — BARIUM SULFATE 10 ML: 400 SUSPENSION ORAL at 14:00

## 2025-02-04 RX ADMIN — BARIUM SULFATE 10 ML: 400 SUSPENSION ORAL at 13:59

## 2025-02-04 RX ADMIN — BARIUM SULFATE 700 MG: 700 TABLET ORAL at 14:00

## 2025-02-04 NOTE — PROGRESS NOTES
"Speech-Language Pathology    Outpatient Modified Barium Swallow Study    Patient Name: Fidelia Santiago  MRN: [id number]  : 1975  Today's Date: 25  Time Calculation  Start Time: 1330  Stop Time: 1355  Time Calculation (min): 25 min    Modified Barium Swallow Study completed. Informed verbal consent obtained prior to completion of exam. Trials of thin, nectar/mildly thick liquid, honey/moderately thick liquid, puree, regular solids and barium tablet with apple sauce were given. A 1.9 cm or .75 inch (outer diameter) ring was placed on the chin in order to complete objective measurements during swallowing. The anatomic structures and function of the oropharynx, larynx, hypopharynx and cervical esophagus were evaluated.    SLP: Myra Bacon SLP   Contact info: [facility] secure chat      Reason for Referral: Dysphagia  Patient Hx: Anoxic encephalopathy, trach dependent respiratory failure  Respiratory Status: Room air, cuffed trach, breathy leak speech  Current diet: PEG for nutrition and hydration    Pain:  Pain Scale: 0-10  Ratin  Location: \"back\". Did not elaborate.       DIET RECOMMENDATIONS:     Per the results of today's MBSS, patient initiate therapeutic feedings with goal of upgrade to pureed diet with honey/mod thick liquids following the swallowing strategies below:    STRATEGIES:  Upright posture  Total assist  Oral care frequently throughout the day (first thing in am, last thing at night, before and after intake)  Water/ ice between meals, after oral care per [facility]o water protocol.  Cue multiple swallows to clear oral and pharyngeal residue.  Check mouth for pocketed material during and after intake      SLP PLAN:  Skilled SLP Services: Skilled SLP intervention for dysphagia is warranted.  SLP Frequency: To be determined by treating SLP in SNF setting  Treatment/Interventions:   - Oropharyngeal exercises  - Bolus trials  - Compensatory strategy training  - Patient/caregiver " education    Discussed POC: Patient  Discussed Risks/Benefits: Yes  Patient/Caregiver Agreeable: Yes    Short term goals established 02/04/25:   - Patient will tolerate therapeutic trials without overt s/s of aspiration in 95 % of PO trials with mod cueing.  - Pt will implement safe swallow strategies to aide in oral clearance as measured by SLP assessment in 90% of therapeutic trials with SLP and mod assist.  - Pt will complete isokinetic CTAR 3-5 seconds; 10 times, with  mod assist, to improve hyolaryngeal elevation and UES opening; impacting airway protection and pharyngeal clearance during the swallow.   - Pt will complete 2 sets of 10 breaths, 2x/day with the Breather given mod to  max cueing, as observed during sessions, in order to improve respiratory support for speech and swallowing.      Long term goals 02/04/25:   Patient will tolerate the least restrictive diet without overt difficulty or further pulmonary compromise by time of discharge.    Education Provided: basic review of results with the patient. Limited comprehension by the patient suspected.     Additional Medical Consults Suggested:   - No new disciplines indicated    Repeat Study: Per MD or treating SLP    Mechanics of the Swallow Summary:  ORAL PHASE:  Lip Closure - Interlabial escape/no progression to anterior lip   Tongue Control During Bolus Hold - Posterior escape of less than half of the bolus   Bolus prep/mastication - Minimal mastication noted with majority of bolus left unchewed - oral care provided to clear cookie after swallow attempt  Bolus transport/lingual motion - Brisk tongue motion for A-P movement of the bolus   Oral residue - Majority of bolus remaining     PHARYNGEAL PHASE:  Initiation of pharyngeal swallow - Bolus head at pit of pyriforms   Soft palate elevation - No bolus between soft palate/pharyngeal wall   Laryngeal elevation - Minimal superior movement of thyroid cartilage with minimal approximation of arytenoids to  epiglottic petiole   Anterior hyoid excursion - Partial anterior movement   Epiglottic movement - Complete inversion    Laryngeal vestibule closure - Incomplete - narrow column of air/contrast in laryngeal vestibule   Pharyngeal stripping wave - Present, however, diminished   Pharyngeal contraction (A/P view) - Not tested       Pharyngoesophageal segment opening - Partial distension/partial duration with partial obstruction of flow of bolus   Tongue base retraction - Wide column of contrast or air between tongue base and pharyngeal wall   Pharyngeal residue - Collection of residue within or on the pharyngeal structures     ESOPHAGEAL PHASE:  Esophageal clearance - Esophageal retention with retrograde flow below the pharyngoesophageal segment       SLP Impressions with Severity Rating:   Pt presents with marked oropharyngeal dysphagia upon completion of modified barium swallow study this date. Swallowing physiology is detailed above. Impairments most impacting swallowing safety and efficiency include poor bolus containment and control. Limited pharyngeal movement with poor airway protection. Patient demonstrated SILENT ASPIRATION before, during and after thin and IDDSI 2/nectar thick liquids. Pureed and IDDSI 3/honey thick liquids were swallowed without obvious laryngeal penetration or aspiration of same.     *Of note: The A-P bolus follow-through is not intended to be utilized as a diagnostic assessment of the esophagus, rather a tool to observe the biomechanical aspects of the swallow continuum and to inform the need for further evaluation by medical specialists, as applicable.     Strategies attempted- The patient did not dependably engage in strategies intended to improve oral control, pharyngeal clearance or airway protection apparently due to cognitive limitations.       OUTCOME MEASURES:  Functional Oral Intake Scale  Functional Oral Intake Scale: Level 3        tube dependent with consistent oral intake of  food and liquid       Rosenbek's Penetration Aspiration Scale  Thin Liquids: 8. SILENT ASPIRATION - contrast passes glottis, visible residue, NO pt response]   Before the Swallow  During the Swallow  After the Swallow  Nectar Thick Liquids: 8. SILENT ASPIRATION - contrast passes glottis, visible residue, NO pt response]   Before the Swallow  During the Swallow  After the Swallow  Honey Thick Liquids: 1. NO ASPIRATION & NO PENETRATION - no aspiration, contrast does not enter airway  Puree: 1. NO ASPIRATION & NO PENETRATION - no aspiration, contrast does not enter airway  Solids: did not swallow the cookie

## 2025-02-20 ENCOUNTER — NURSING HOME VISIT (OUTPATIENT)
Dept: POST ACUTE CARE | Facility: EXTERNAL LOCATION | Age: 50
End: 2025-02-20
Payer: MEDICAID

## 2025-02-20 DIAGNOSIS — G93.1 ANOXIC ENCEPHALOPATHY: ICD-10-CM

## 2025-02-20 DIAGNOSIS — F43.10 POSTTRAUMATIC STRESS DISORDER: ICD-10-CM

## 2025-02-20 DIAGNOSIS — Z78.9 NURSING HOME RESIDENT: ICD-10-CM

## 2025-02-20 DIAGNOSIS — Z93.0 TRACHEOSTOMY STATUS (MULTI): ICD-10-CM

## 2025-02-20 DIAGNOSIS — J96.01 ACUTE RESPIRATORY FAILURE WITH HYPOXEMIA (MULTI): ICD-10-CM

## 2025-02-20 DIAGNOSIS — Z00.00 ROUTINE GENERAL MEDICAL EXAMINATION AT A HEALTH CARE FACILITY: ICD-10-CM

## 2025-02-20 DIAGNOSIS — F31.9 BIPOLAR AFFECTIVE DISORDER, REMISSION STATUS UNSPECIFIED (MULTI): ICD-10-CM

## 2025-02-20 PROCEDURE — 99309 SBSQ NF CARE MODERATE MDM 30: CPT | Performed by: INTERNAL MEDICINE

## 2025-02-20 NOTE — LETTER
Patient: Fidelia Santiago  : 1975    Encounter Date: 2025    Name: Fidelia Santiago  : 1975  MRN: [id number]  Visit Date: 2025  Chief Complaint: Monthly LT Physician Visit    HPI: 50 y/o, Full Code, presented to the ER d/t unresponsiveness, found by boyfriend. He was aggressive at the scene and had to be taken away by police. Intubated by EMS. Narcan given x4 by EMS without improvement in consciousness. Empty methadone bottles were found in the home and boyfriend stated that they were his. All drug and ETOH panels were -ve. She was found to have sepsis most likely 2/2 aspiration pneumonia, HERI, transaminases, rhabdomyolysis and elevated troponin. EEG showed diffuse encephalopathy. Family opted for trach and PEG tube placement. Trach placed  and PEG placed . Discharged to facility with stage 1 sacral ulcer. Discharged to [facility]. On 2024 for ongoing vent management, medication management and therapy services.     Subjective: Seen and examined today. Expressed that she wants to eat. Had a recent swallow evaluation at [facility] and they rec therapeutic feedings of puree and honey/moderate thick liquids while upright, total assist, oral care, water/ice in between meals, cueing multiple swallows to clear residue. Trach in place, capped. Nursing team report no issues.     The patient was counseled regarding diagnostic results, instructions for management, risk factor reductions, prognosis, patient and family education, impressions, risks and benefits of treatment options and importance of compliance with treatment. I have reviewed nursing notes since my last visit and document any significant changes Reviewed orders, medications, Labs. Reviewed chart looking at current medications, treatments, labs, x-rays etc.     ROS:  As above in subjective. Otherwise, all other systems have been reviewed and are negative for complaint.    Current Outpatient Medications   Medication  Instructions   • acetaminophen (TYLENOL) 650 mg, Every 6 hours PRN   • amLODIPine (NORVASC) 5 mg, g-tube, Daily   • busPIRone (BUSPAR) 7.5 mg, 2 times daily   • carvedilol (COREG) 3.125 mg, oral, 2 times daily   • cholecalciferol (VITAMIN D-3) 25 mcg, Daily   • clonazePAM (KLONOPIN) 1 mg, 2 times daily   • cyclobenzaprine (FLEXERIL) 10 mg, Daily PRN   • docusate sodium (COLACE) 100 mg, 2 times daily   • DULoxetine (CYMBALTA) 20 mg, Daily   • enoxaparin (LOVENOX) 40 mg, Daily RT   • famotidine (PEPCID) 20 mg, nasogastric tube, Daily   • hydrOXYzine HCL (ATARAX) 25 mg, Every 8 hours PRN   • ipratropium-albuteroL (Duo-Neb) 0.5-2.5 mg/3 mL nebulizer solution 3 mL, Every 4 hours PRN   • Klayesta 100,000 unit/gram powder 1 Application, 2 times daily   • lamoTRIgine (LAMICTAL) 50 mg, 2 times daily   • [START ON 3/19/2025] lamoTRIgine (LAMICTAL) 100 mg, 2 times daily   • LORazepam (ATIVAN) 0.5 mg, Daily PRN   • metoprolol tartrate (LOPRESSOR) 25 mg, 2 times daily   • tiZANidine (ZANAFLEX) 2 mg, Every 12 hours PRN      Visit Vitals  /66   Pulse 86   Temp 36.3 °C (97.4 °F)   Resp 18   Wt 76.5 kg (168 lb 9.6 oz)   SpO2 98%   BMI 28.94 kg/m²   OB Status Menopausal   Smoking Status Never   BSA 1.86 m²      Physical Exam  Vitals reviewed. Exam conducted with a chaperone present.   Constitutional:       General: She is awake.      Appearance: She is ill-appearing.   HENT:      Head: Normocephalic.      Right Ear: External ear normal.      Left Ear: External ear normal.      Nose: Nose normal.      Mouth/Throat:      Lips: Pink.      Mouth: Mucous membranes are dry.   Eyes:      General: Lids are normal.   Neck:      Trachea: Tracheostomy present.      Comments: Capped trach  Cardiovascular:      Rate and Rhythm: Normal rate and regular rhythm.      Heart sounds: Normal heart sounds.   Pulmonary:      Effort: Pulmonary effort is normal.      Breath sounds: Decreased breath sounds and rhonchi present.   Abdominal:       General: A surgical scar is present. Bowel sounds are normal.      Palpations: Abdomen is soft.      Comments: PEG tube in place   Skin:     General: Skin is warm and moist.      Findings: Bruising present.   Neurological:      Mental Status: She is alert.      Cranial Nerves: Cranial nerve deficit present.      Sensory: Sensory deficit present.      Motor: Weakness and atrophy present.   Psychiatric:         Mood and Affect: Mood is anxious.         Behavior: Behavior is cooperative.       Lab Results   Component Value Date    WBC 8.9 11/23/2024    HGB 13.8 11/23/2024    HCT 41.1 11/23/2024     11/23/2024    ALT 26 11/23/2024    AST 18 11/23/2024     11/23/2024    K 4.3 11/23/2024     11/23/2024    CREATININE 0.37 (L) 11/23/2024    BUN 17 11/23/2024    CO2 27 11/23/2024    INR 1.1 11/23/2024    HGBA1C 5.1 12/15/2020     Results were reviewed and addressed accordingly. Lab Results were also reviewed in [facility].     Provider Impression:   Anoxic Encephalopathy  #found unresponsive on 8/21/2024 of unclear etiology with aspiration pneumonia and respiratory failure  - toxicology was -ve for any illegal substances  - no improvement with narcan (used several times on pt)  - EEG showed severe diffuse encephalopathy  - trach is capped today and pt is reports that she wants to eat.    Trach Dependent Respiratory Failure   #trach placed 9/4/2024  - wean as tolerated, now capped  - consult pulm and respiratory team  - wilner as scheduled  - monitor respiratory status closely    Elevated troponin felt to be 2/2 non ischemic MI d/t myocardial imbalance in 02 supply/demand 2/2 rhabdomyolysis  - improved with hydration  - Echo done 8/22/2024 showing LVSF 40%, global hypokinesis of LV, pseudo normal pattern of LVDF, reduced RVSF.  - Cardiology would like to see pt OP when she improves from other issues to rule out ischemia.    BiPolar Disorder, PTSD  - c/w lamictal, buspar, cymbalta  - monitor  - psych team is  following pt    Dysphagia s/p PEG tube placement  - consulted dietician  - c/w tube feed and water flushes  - monitor weights  - recent swallow evaluation at [facility] and they rec therapeutic feedings of puree and honey/moderate thick liquids while upright, total assist, oral care, water/ice in between meals, cueing multiple swallows to clear residue.   - working with speech therapy    Tachycardia, HTN  - coreg  - discontinue metoprolol d/t pt not needing to be on 2 beta blockers.  - c/w amlodipine    GI Ppx  - c/w pepcid    DVT Ppx  - lovenox subcutaneous    Code Status  - Full Code  ----------------  Written by Fidelia Crabtree RN, acting as a scribe for Dr. Kearns. This note accurately reflects the work and decisions made by Dr. Kearns.     I, Dr. Kearns, attest all medical record entries made by the scribe were under my direction and were personally dictated by me. I have reviewed the chart and agree that the record accurately reflects my performance of the history, physical exam, and assessment and plan.       Electronically Signed By: Marck Alves MD   3/12/25 12:06 PM

## 2025-03-12 VITALS
WEIGHT: 168.6 LBS | HEART RATE: 86 BPM | RESPIRATION RATE: 18 BRPM | OXYGEN SATURATION: 98 % | SYSTOLIC BLOOD PRESSURE: 110 MMHG | BODY MASS INDEX: 28.94 KG/M2 | DIASTOLIC BLOOD PRESSURE: 66 MMHG | TEMPERATURE: 97.4 F

## 2025-03-12 PROBLEM — F31.9 BIPOLAR 1 DISORDER (MULTI): Status: RESOLVED | Noted: 2023-10-19 | Resolved: 2025-03-12

## 2025-03-12 PROBLEM — R79.89 ELEVATED SERUM HCG: Status: RESOLVED | Noted: 2023-12-14 | Resolved: 2025-03-12

## 2025-03-12 PROBLEM — N63.20 LEFT BREAST MASS: Status: RESOLVED | Noted: 2018-05-25 | Resolved: 2025-03-12

## 2025-03-12 RX ORDER — HYDROXYZINE HYDROCHLORIDE 25 MG/1
25 TABLET, FILM COATED ORAL EVERY 8 HOURS PRN
COMMUNITY
Start: 2025-01-08

## 2025-03-12 RX ORDER — VIT C/E/ZN/COPPR/LUTEIN/ZEAXAN 250MG-90MG
25 CAPSULE ORAL DAILY
COMMUNITY

## 2025-03-12 RX ORDER — TIZANIDINE 2 MG/1
2 TABLET ORAL EVERY 12 HOURS PRN
COMMUNITY
Start: 2025-01-02

## 2025-03-12 RX ORDER — DULOXETIN HYDROCHLORIDE 20 MG/1
20 CAPSULE, DELAYED RELEASE ORAL DAILY
COMMUNITY

## 2025-03-12 RX ORDER — LAMOTRIGINE 100 MG/1
100 TABLET ORAL 2 TIMES DAILY
COMMUNITY
Start: 2025-03-19

## 2025-03-12 RX ORDER — LAMOTRIGINE 25 MG/1
50 TABLET ORAL 2 TIMES DAILY
COMMUNITY
Start: 2025-01-17

## 2025-03-12 RX ORDER — BUSPIRONE HYDROCHLORIDE 15 MG/1
7.5 TABLET ORAL 2 TIMES DAILY
COMMUNITY

## 2025-03-12 RX ORDER — METOPROLOL TARTRATE 25 MG/1
25 TABLET, FILM COATED ORAL 2 TIMES DAILY
COMMUNITY

## 2025-03-12 RX ORDER — CYCLOBENZAPRINE HCL 10 MG
10 TABLET ORAL DAILY PRN
COMMUNITY

## 2025-03-12 RX ORDER — DOCUSATE SODIUM 100 MG/1
100 CAPSULE, LIQUID FILLED ORAL 2 TIMES DAILY
COMMUNITY

## 2025-03-12 RX ORDER — NYSTATIN TOPICAL POWDER 100000 U/G
1 POWDER TOPICAL 2 TIMES DAILY
COMMUNITY
Start: 2024-10-27

## 2025-03-12 RX ORDER — CLONAZEPAM 1 MG/1
1 TABLET ORAL 2 TIMES DAILY
COMMUNITY

## 2025-03-12 RX ORDER — LORAZEPAM 0.5 MG/1
0.5 TABLET ORAL DAILY PRN
COMMUNITY

## 2025-03-12 NOTE — PROGRESS NOTES
Name: Fidelia Santiago  : 1975  MRN: [id number]  Visit Date: 2025  Chief Complaint: Monthly LT Physician Visit    HPI: 48 y/o, Full Code, presented to the ER d/t unresponsiveness, found by boyfriend. He was aggressive at the scene and had to be taken away by police. Intubated by EMS. Narcan given x4 by EMS without improvement in consciousness. Empty methadone bottles were found in the home and boyfriend stated that they were his. All drug and ETOH panels were -ve. She was found to have sepsis most likely 2/2 aspiration pneumonia, HERI, transaminases, rhabdomyolysis and elevated troponin. EEG showed diffuse encephalopathy. Family opted for trach and PEG tube placement. Trach placed  and PEG placed . Discharged to facility with stage 1 sacral ulcer. Discharged to [facility]. On 2024 for ongoing vent management, medication management and therapy services.     Subjective: Seen and examined today. Expressed that she wants to eat. Had a recent swallow evaluation at [facility] and they rec therapeutic feedings of puree and honey/moderate thick liquids while upright, total assist, oral care, water/ice in between meals, cueing multiple swallows to clear residue. Trach in place, capped. Nursing team report no issues.     The patient was counseled regarding diagnostic results, instructions for management, risk factor reductions, prognosis, patient and family education, impressions, risks and benefits of treatment options and importance of compliance with treatment. I have reviewed nursing notes since my last visit and document any significant changes Reviewed orders, medications, Labs. Reviewed chart looking at current medications, treatments, labs, x-rays etc.     ROS:  As above in subjective. Otherwise, all other systems have been reviewed and are negative for complaint.    Current Outpatient Medications   Medication Instructions    acetaminophen (TYLENOL) 650 mg, Every 6 hours PRN     amLODIPine (NORVASC) 5 mg, g-tube, Daily    busPIRone (BUSPAR) 7.5 mg, 2 times daily    carvedilol (COREG) 3.125 mg, oral, 2 times daily    cholecalciferol (VITAMIN D-3) 25 mcg, Daily    clonazePAM (KLONOPIN) 1 mg, 2 times daily    cyclobenzaprine (FLEXERIL) 10 mg, Daily PRN    docusate sodium (COLACE) 100 mg, 2 times daily    DULoxetine (CYMBALTA) 20 mg, Daily    enoxaparin (LOVENOX) 40 mg, Daily RT    famotidine (PEPCID) 20 mg, nasogastric tube, Daily    hydrOXYzine HCL (ATARAX) 25 mg, Every 8 hours PRN    ipratropium-albuteroL (Duo-Neb) 0.5-2.5 mg/3 mL nebulizer solution 3 mL, Every 4 hours PRN    Klayesta 100,000 unit/gram powder 1 Application, 2 times daily    lamoTRIgine (LAMICTAL) 50 mg, 2 times daily    [START ON 3/19/2025] lamoTRIgine (LAMICTAL) 100 mg, 2 times daily    LORazepam (ATIVAN) 0.5 mg, Daily PRN    metoprolol tartrate (LOPRESSOR) 25 mg, 2 times daily    tiZANidine (ZANAFLEX) 2 mg, Every 12 hours PRN      Visit Vitals  /66   Pulse 86   Temp 36.3 °C (97.4 °F)   Resp 18   Wt 76.5 kg (168 lb 9.6 oz)   SpO2 98%   BMI 28.94 kg/m²   OB Status Menopausal   Smoking Status Never   BSA 1.86 m²      Physical Exam  Vitals reviewed. Exam conducted with a chaperone present.   Constitutional:       General: She is awake.      Appearance: She is ill-appearing.   HENT:      Head: Normocephalic.      Right Ear: External ear normal.      Left Ear: External ear normal.      Nose: Nose normal.      Mouth/Throat:      Lips: Pink.      Mouth: Mucous membranes are dry.   Eyes:      General: Lids are normal.   Neck:      Trachea: Tracheostomy present.      Comments: Capped trach  Cardiovascular:      Rate and Rhythm: Normal rate and regular rhythm.      Heart sounds: Normal heart sounds.   Pulmonary:      Effort: Pulmonary effort is normal.      Breath sounds: Decreased breath sounds and rhonchi present.   Abdominal:      General: A surgical scar is present. Bowel sounds are normal.      Palpations: Abdomen is soft.       Comments: PEG tube in place   Skin:     General: Skin is warm and moist.      Findings: Bruising present.   Neurological:      Mental Status: She is alert.      Cranial Nerves: Cranial nerve deficit present.      Sensory: Sensory deficit present.      Motor: Weakness and atrophy present.   Psychiatric:         Mood and Affect: Mood is anxious.         Behavior: Behavior is cooperative.       Lab Results   Component Value Date    WBC 8.9 11/23/2024    HGB 13.8 11/23/2024    HCT 41.1 11/23/2024     11/23/2024    ALT 26 11/23/2024    AST 18 11/23/2024     11/23/2024    K 4.3 11/23/2024     11/23/2024    CREATININE 0.37 (L) 11/23/2024    BUN 17 11/23/2024    CO2 27 11/23/2024    INR 1.1 11/23/2024    HGBA1C 5.1 12/15/2020     Results were reviewed and addressed accordingly. Lab Results were also reviewed in [facility].     Provider Impression:   Anoxic Encephalopathy  #found unresponsive on 8/21/2024 of unclear etiology with aspiration pneumonia and respiratory failure  - toxicology was -ve for any illegal substances  - no improvement with narcan (used several times on pt)  - EEG showed severe diffuse encephalopathy  - trach is capped today and pt is reports that she wants to eat.    Trach Dependent Respiratory Failure   #trach placed 9/4/2024  - wean as tolerated, now capped  - consult pulm and respiratory team  - wilner as scheduled  - monitor respiratory status closely    Elevated troponin felt to be 2/2 non ischemic MI d/t myocardial imbalance in 02 supply/demand 2/2 rhabdomyolysis  - improved with hydration  - Echo done 8/22/2024 showing LVSF 40%, global hypokinesis of LV, pseudo normal pattern of LVDF, reduced RVSF.  - Cardiology would like to see pt OP when she improves from other issues to rule out ischemia.    BiPolar Disorder, PTSD  - c/w lamictal, buspar, cymbalta  - monitor  - psych team is following pt    Dysphagia s/p PEG tube placement  - consulted dietician  - c/w tube feed and  water flushes  - monitor weights  - recent swallow evaluation at [facility] and they rec therapeutic feedings of puree and honey/moderate thick liquids while upright, total assist, oral care, water/ice in between meals, cueing multiple swallows to clear residue.   - working with speech therapy    Tachycardia, HTN  - coreg  - discontinue metoprolol d/t pt not needing to be on 2 beta blockers.  - c/w amlodipine    GI Ppx  - c/w pepcid    DVT Ppx  - lovenox subcutaneous    Code Status  - Full Code  ----------------  Written by Fidelia Crabtree RN, acting as a scribe for Dr. Kearns. This note accurately reflects the work and decisions made by Dr. Kearns.     I, Dr. Kearns, attest all medical record entries made by the scribe were under my direction and were personally dictated by me. I have reviewed the chart and agree that the record accurately reflects my performance of the history, physical exam, and assessment and plan.